# Patient Record
Sex: MALE | Race: WHITE | Employment: OTHER | URBAN - METROPOLITAN AREA
[De-identification: names, ages, dates, MRNs, and addresses within clinical notes are randomized per-mention and may not be internally consistent; named-entity substitution may affect disease eponyms.]

---

## 2017-01-01 ENCOUNTER — APPOINTMENT (OUTPATIENT)
Dept: CT IMAGING | Age: 82
DRG: 194 | End: 2017-01-01
Payer: MEDICARE

## 2017-01-01 ENCOUNTER — OFFICE VISIT (OUTPATIENT)
Dept: CARDIOLOGY | Age: 82
End: 2017-01-01

## 2017-01-01 ENCOUNTER — HOSPITAL ENCOUNTER (INPATIENT)
Age: 82
LOS: 3 days | Discharge: NURSING, REHAB OR LONG-TERM CARE | DRG: 194 | End: 2017-10-20
Attending: STUDENT IN AN ORGANIZED HEALTH CARE EDUCATION/TRAINING PROGRAM | Admitting: INTERNAL MEDICINE
Payer: MEDICARE

## 2017-01-01 ENCOUNTER — APPOINTMENT (OUTPATIENT)
Dept: GENERAL RADIOLOGY | Age: 82
DRG: 189 | End: 2017-01-01
Payer: MEDICARE

## 2017-01-01 ENCOUNTER — APPOINTMENT (OUTPATIENT)
Dept: GENERAL RADIOLOGY | Age: 82
DRG: 194 | End: 2017-01-01
Payer: MEDICARE

## 2017-01-01 ENCOUNTER — APPOINTMENT (OUTPATIENT)
Dept: GENERAL RADIOLOGY | Age: 82
DRG: 949 | End: 2017-01-01
Attending: PHYSICAL MEDICINE & REHABILITATION
Payer: MEDICARE

## 2017-01-01 ENCOUNTER — OFFICE VISIT (OUTPATIENT)
Dept: GERIATRIC MEDICINE | Age: 82
End: 2017-01-01

## 2017-01-01 ENCOUNTER — HOSPITAL ENCOUNTER (INPATIENT)
Age: 82
LOS: 3 days | Discharge: NURSING, REHAB OR LONG-TERM CARE | DRG: 189 | End: 2017-09-20
Attending: EMERGENCY MEDICINE | Admitting: INTERNAL MEDICINE
Payer: MEDICARE

## 2017-01-01 ENCOUNTER — HOSPITAL ENCOUNTER (INPATIENT)
Age: 82
LOS: 12 days | Discharge: HOME HEALTH | DRG: 949 | End: 2017-11-01
Attending: PHYSICAL MEDICINE & REHABILITATION | Admitting: PHYSICAL MEDICINE & REHABILITATION
Payer: MEDICARE

## 2017-01-01 VITALS
HEART RATE: 56 BPM | BODY MASS INDEX: 26.51 KG/M2 | TEMPERATURE: 97.3 F | DIASTOLIC BLOOD PRESSURE: 73 MMHG | SYSTOLIC BLOOD PRESSURE: 165 MMHG | HEIGHT: 73 IN | RESPIRATION RATE: 6 BRPM | WEIGHT: 200 LBS | OXYGEN SATURATION: 97 %

## 2017-01-01 VITALS
HEIGHT: 72 IN | WEIGHT: 195 LBS | RESPIRATION RATE: 16 BRPM | DIASTOLIC BLOOD PRESSURE: 74 MMHG | OXYGEN SATURATION: 97 % | HEART RATE: 70 BPM | BODY MASS INDEX: 26.41 KG/M2 | SYSTOLIC BLOOD PRESSURE: 136 MMHG | TEMPERATURE: 98.2 F

## 2017-01-01 VITALS
SYSTOLIC BLOOD PRESSURE: 136 MMHG | BODY MASS INDEX: 26.41 KG/M2 | DIASTOLIC BLOOD PRESSURE: 74 MMHG | WEIGHT: 195 LBS | HEART RATE: 76 BPM | TEMPERATURE: 98.2 F | OXYGEN SATURATION: 97 % | HEIGHT: 72 IN | RESPIRATION RATE: 18 BRPM

## 2017-01-01 VITALS
RESPIRATION RATE: 16 BRPM | HEART RATE: 60 BPM | HEIGHT: 72 IN | WEIGHT: 186.07 LBS | BODY MASS INDEX: 25.2 KG/M2 | SYSTOLIC BLOOD PRESSURE: 108 MMHG | OXYGEN SATURATION: 96 % | DIASTOLIC BLOOD PRESSURE: 65 MMHG | TEMPERATURE: 98 F

## 2017-01-01 VITALS — SYSTOLIC BLOOD PRESSURE: 110 MMHG | OXYGEN SATURATION: 98 % | DIASTOLIC BLOOD PRESSURE: 60 MMHG | HEART RATE: 83 BPM

## 2017-01-01 VITALS
OXYGEN SATURATION: 96 % | BODY MASS INDEX: 25.04 KG/M2 | WEIGHT: 195 LBS | DIASTOLIC BLOOD PRESSURE: 70 MMHG | HEART RATE: 72 BPM | SYSTOLIC BLOOD PRESSURE: 120 MMHG

## 2017-01-01 VITALS
OXYGEN SATURATION: 98 % | RESPIRATION RATE: 16 BRPM | HEART RATE: 62 BPM | SYSTOLIC BLOOD PRESSURE: 105 MMHG | TEMPERATURE: 97.5 F | DIASTOLIC BLOOD PRESSURE: 57 MMHG | BODY MASS INDEX: 25.74 KG/M2 | WEIGHT: 190.04 LBS | HEIGHT: 72 IN

## 2017-01-01 VITALS — HEART RATE: 86 BPM | SYSTOLIC BLOOD PRESSURE: 113 MMHG | TEMPERATURE: 98.2 F | DIASTOLIC BLOOD PRESSURE: 66 MMHG

## 2017-01-01 DIAGNOSIS — I10 ESSENTIAL HYPERTENSION: ICD-10-CM

## 2017-01-01 DIAGNOSIS — G31.84 MCI (MILD COGNITIVE IMPAIRMENT): ICD-10-CM

## 2017-01-01 DIAGNOSIS — R19.7 DIARRHEA, UNSPECIFIED TYPE: ICD-10-CM

## 2017-01-01 DIAGNOSIS — E78.5 DYSLIPIDEMIA: ICD-10-CM

## 2017-01-01 DIAGNOSIS — J44.1 COPD EXACERBATION (HCC): Primary | ICD-10-CM

## 2017-01-01 DIAGNOSIS — G30.9 ALZHEIMER'S DEMENTIA WITHOUT BEHAVIORAL DISTURBANCE, UNSPECIFIED TIMING OF DEMENTIA ONSET: ICD-10-CM

## 2017-01-01 DIAGNOSIS — R77.8 ELEVATED TROPONIN: ICD-10-CM

## 2017-01-01 DIAGNOSIS — R53.1 GENERAL WEAKNESS: ICD-10-CM

## 2017-01-01 DIAGNOSIS — R53.1 WEAKNESS: ICD-10-CM

## 2017-01-01 DIAGNOSIS — E11.65 TYPE 2 DIABETES MELLITUS WITH HYPERGLYCEMIA, UNSPECIFIED LONG TERM INSULIN USE STATUS: ICD-10-CM

## 2017-01-01 DIAGNOSIS — R06.00 DYSPNEA AND RESPIRATORY ABNORMALITIES: ICD-10-CM

## 2017-01-01 DIAGNOSIS — F02.80 ALZHEIMER'S DEMENTIA WITHOUT BEHAVIORAL DISTURBANCE, UNSPECIFIED TIMING OF DEMENTIA ONSET: ICD-10-CM

## 2017-01-01 DIAGNOSIS — R06.89 DYSPNEA AND RESPIRATORY ABNORMALITIES: ICD-10-CM

## 2017-01-01 DIAGNOSIS — J44.1 COPD WITH ACUTE EXACERBATION (HCC): Primary | ICD-10-CM

## 2017-01-01 DIAGNOSIS — F32.A DEPRESSION, UNSPECIFIED DEPRESSION TYPE: ICD-10-CM

## 2017-01-01 DIAGNOSIS — Z87.891 HISTORY OF TOBACCO USE: ICD-10-CM

## 2017-01-01 DIAGNOSIS — R79.82 ELEVATED C-REACTIVE PROTEIN (CRP): ICD-10-CM

## 2017-01-01 DIAGNOSIS — R09.02 HYPOXIA: ICD-10-CM

## 2017-01-01 DIAGNOSIS — R26.81 UNSTEADY GAIT: ICD-10-CM

## 2017-01-01 DIAGNOSIS — J18.9 PNEUMONIA DUE TO ORGANISM: Primary | ICD-10-CM

## 2017-01-01 DIAGNOSIS — R06.09 EXERTIONAL DYSPNEA: ICD-10-CM

## 2017-01-01 DIAGNOSIS — I25.10 CORONARY ARTERY DISEASE INVOLVING NATIVE CORONARY ARTERY OF NATIVE HEART WITHOUT ANGINA PECTORIS: Primary | Chronic | ICD-10-CM
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
## 2017-01-01 DIAGNOSIS — S09.90XA CLOSED HEAD INJURY, INITIAL ENCOUNTER: ICD-10-CM

## 2017-01-01 LAB
ALBUMIN SERPL-MCNC: 3.2 G/DL (ref 3.9–4.9)
ALBUMIN SERPL-MCNC: 3.3 G/DL (ref 3.9–4.9)
ALBUMIN SERPL-MCNC: 4.3 G/DL (ref 3.9–4.9)
ALP BLD-CCNC: 80 U/L (ref 35–104)
ALP BLD-CCNC: 80 U/L (ref 35–104)
ALP BLD-CCNC: 95 U/L (ref 35–104)
ALT SERPL-CCNC: 10 U/L (ref 0–41)
ALT SERPL-CCNC: 11 U/L (ref 0–41)
ALT SERPL-CCNC: 14 U/L (ref 0–41)
ANION GAP SERPL CALCULATED.3IONS-SCNC: 11 MEQ/L (ref 7–13)
ANION GAP SERPL CALCULATED.3IONS-SCNC: 13 MEQ/L (ref 7–13)
ANION GAP SERPL CALCULATED.3IONS-SCNC: 14 MEQ/L (ref 7–13)
ANION GAP SERPL CALCULATED.3IONS-SCNC: 17 MEQ/L (ref 7–13)
APTT: 28.5 SEC (ref 21.6–35.4)
AST SERPL-CCNC: 13 U/L (ref 0–40)
AST SERPL-CCNC: 16 U/L (ref 0–40)
AST SERPL-CCNC: 16 U/L (ref 0–40)
BASE EXCESS ARTERIAL: 5 (ref -3–3)
BASOPHILS ABSOLUTE: 0.1 K/UL (ref 0–0.2)
BASOPHILS ABSOLUTE: 0.1 K/UL (ref 0–0.2)
BASOPHILS RELATIVE PERCENT: 0.5 %
BASOPHILS RELATIVE PERCENT: 0.5 %
BILIRUB SERPL-MCNC: 0.5 MG/DL (ref 0–1.2)
BILIRUB SERPL-MCNC: 0.8 MG/DL (ref 0–1.2)
BILIRUB SERPL-MCNC: 0.9 MG/DL (ref 0–1.2)
BILIRUBIN URINE: NEGATIVE
BILIRUBIN URINE: NEGATIVE
BLOOD CULTURE, ROUTINE: NORMAL
BLOOD CULTURE, ROUTINE: NORMAL
BLOOD, URINE: ABNORMAL
BLOOD, URINE: ABNORMAL
BUN BLDV-MCNC: 12 MG/DL (ref 8–23)
BUN BLDV-MCNC: 13 MG/DL (ref 8–23)
BUN BLDV-MCNC: 14 MG/DL (ref 8–23)
BUN BLDV-MCNC: 14 MG/DL (ref 8–23)
C-REACTIVE PROTEIN, HIGH SENSITIVITY: 206 MG/L (ref 0–5)
CALCIUM IONIZED: 1.25 MMOL/L (ref 1.12–1.32)
CALCIUM SERPL-MCNC: 9.2 MG/DL (ref 8.6–10.2)
CALCIUM SERPL-MCNC: 9.3 MG/DL (ref 8.6–10.2)
CALCIUM SERPL-MCNC: 9.5 MG/DL (ref 8.6–10.2)
CALCIUM SERPL-MCNC: 9.6 MG/DL (ref 8.6–10.2)
CHLORIDE BLD-SCNC: 101 MEQ/L (ref 98–107)
CHLORIDE BLD-SCNC: 102 MEQ/L (ref 98–107)
CHLORIDE BLD-SCNC: 105 MEQ/L (ref 98–107)
CHLORIDE BLD-SCNC: 97 MEQ/L (ref 98–107)
CHOLESTEROL, TOTAL: 134 MG/DL (ref 0–199)
CLARITY: CLEAR
CLARITY: CLEAR
CLOSTRIDIUM DIFFICILE DNA AMPLIFICATION: NORMAL
CO2: 24 MEQ/L (ref 22–29)
CO2: 28 MEQ/L (ref 22–29)
CO2: 29 MEQ/L (ref 22–29)
CO2: 29 MEQ/L (ref 22–29)
COLOR: YELLOW
COLOR: YELLOW
CREAT SERPL-MCNC: 0.89 MG/DL (ref 0.7–1.2)
CREAT SERPL-MCNC: 0.96 MG/DL (ref 0.7–1.2)
CREAT SERPL-MCNC: 1.1 MG/DL (ref 0.7–1.2)
CREAT SERPL-MCNC: 1.14 MG/DL (ref 0.7–1.2)
CRYPTOSPORIDIUM ANTIGEN STOOL: NORMAL
CRYSTALS, UA: ABNORMAL
CRYSTALS, UA: ABNORMAL
CULTURE, BLOOD 2: NORMAL
CULTURE, BLOOD 2: NORMAL
EKG ATRIAL RATE: 86 BPM
EKG ATRIAL RATE: 90 BPM
EKG P AXIS: 44 DEGREES
EKG P AXIS: 59 DEGREES
EKG P-R INTERVAL: 222 MS
EKG P-R INTERVAL: 258 MS
EKG Q-T INTERVAL: 326 MS
EKG Q-T INTERVAL: 330 MS
EKG QRS DURATION: 78 MS
EKG QRS DURATION: 82 MS
EKG QTC CALCULATION (BAZETT): 394 MS
EKG QTC CALCULATION (BAZETT): 398 MS
EKG R AXIS: 13 DEGREES
EKG R AXIS: 7 DEGREES
EKG T AXIS: 68 DEGREES
EKG T AXIS: 82 DEGREES
EKG VENTRICULAR RATE: 86 BPM
EKG VENTRICULAR RATE: 90 BPM
EOSINOPHILS ABSOLUTE: 0 K/UL (ref 0–0.7)
EOSINOPHILS ABSOLUTE: 0.1 K/UL (ref 0–0.7)
EOSINOPHILS RELATIVE PERCENT: 0.3 %
EOSINOPHILS RELATIVE PERCENT: 0.9 %
EPITHELIAL CELLS, UA: ABNORMAL /HPF
EPITHELIAL CELLS, UA: ABNORMAL /HPF
GFR AFRICAN AMERICAN: >60
GFR NON-AFRICAN AMERICAN: 58
GFR NON-AFRICAN AMERICAN: >60
GI BACTERIAL PATHOGENS BY PCR: NORMAL
GIARDIA ANTIGEN STOOL: NORMAL
GLOBULIN: 3 G/DL (ref 2.3–3.5)
GLOBULIN: 3.1 G/DL (ref 2.3–3.5)
GLOBULIN: 3.2 G/DL (ref 2.3–3.5)
GLUCOSE BLD-MCNC: 118 MG/DL (ref 60–115)
GLUCOSE BLD-MCNC: 119 MG/DL (ref 74–109)
GLUCOSE BLD-MCNC: 143 MG/DL (ref 74–109)
GLUCOSE BLD-MCNC: 143 MG/DL (ref 74–109)
GLUCOSE BLD-MCNC: 156 MG/DL (ref 60–115)
GLUCOSE BLD-MCNC: 196 MG/DL (ref 60–115)
GLUCOSE BLD-MCNC: 82 MG/DL (ref 74–109)
GLUCOSE URINE: NEGATIVE MG/DL
GLUCOSE URINE: NEGATIVE MG/DL
HBA1C MFR BLD: 6 % (ref 4.8–5.9)
HCO3 ARTERIAL: 30.5 MMOL/L (ref 21–29)
HCT VFR BLD CALC: 37.7 % (ref 42–52)
HCT VFR BLD CALC: 39.1 % (ref 42–52)
HCT VFR BLD CALC: 39.6 % (ref 42–52)
HCT VFR BLD CALC: 42.6 % (ref 42–52)
HCT VFR BLD CALC: 47.4 % (ref 42–52)
HDLC SERPL-MCNC: 36 MG/DL (ref 40–59)
HEMOGLOBIN: 12.4 G/DL (ref 14–18)
HEMOGLOBIN: 12.8 G/DL (ref 14–18)
HEMOGLOBIN: 12.8 G/DL (ref 14–18)
HEMOGLOBIN: 13.7 G/DL (ref 14–18)
HEMOGLOBIN: 15.1 G/DL (ref 14–18)
HEMOGLOBIN: 15.7 GM/DL (ref 13.5–17.5)
INR BLD: 1.1
KETONES, URINE: NEGATIVE MG/DL
KETONES, URINE: NEGATIVE MG/DL
LACTATE: 1.65 MMOL/L (ref 0.4–2)
LACTIC ACID: 1.3 MMOL/L (ref 0.5–2.2)
LDL CHOLESTEROL CALCULATED: 78 MG/DL (ref 0–129)
LEUKOCYTE ESTERASE, URINE: NEGATIVE
LEUKOCYTE ESTERASE, URINE: NEGATIVE
LYMPHOCYTES ABSOLUTE: 0.9 K/UL (ref 1–4.8)
LYMPHOCYTES ABSOLUTE: 1.1 K/UL (ref 1–4.8)
LYMPHOCYTES RELATIVE PERCENT: 7.7 %
LYMPHOCYTES RELATIVE PERCENT: 8.4 %
MAGNESIUM: 1.9 MG/DL (ref 1.7–2.3)
MCH RBC QN AUTO: 29.6 PG (ref 27–31.3)
MCH RBC QN AUTO: 29.9 PG (ref 27–31.3)
MCH RBC QN AUTO: 29.9 PG (ref 27–31.3)
MCH RBC QN AUTO: 30.1 PG (ref 27–31.3)
MCH RBC QN AUTO: 30.2 PG (ref 27–31.3)
MCHC RBC AUTO-ENTMCNC: 31.8 % (ref 33–37)
MCHC RBC AUTO-ENTMCNC: 32.1 % (ref 33–37)
MCHC RBC AUTO-ENTMCNC: 32.3 % (ref 33–37)
MCHC RBC AUTO-ENTMCNC: 32.7 % (ref 33–37)
MCHC RBC AUTO-ENTMCNC: 32.9 % (ref 33–37)
MCV RBC AUTO: 91.2 FL (ref 80–100)
MCV RBC AUTO: 91.8 FL (ref 80–100)
MCV RBC AUTO: 92.6 FL (ref 80–100)
MCV RBC AUTO: 93.3 FL (ref 80–100)
MCV RBC AUTO: 93.9 FL (ref 80–100)
MONOCYTES ABSOLUTE: 0.9 K/UL (ref 0.2–0.8)
MONOCYTES ABSOLUTE: 1.4 K/UL (ref 0.2–0.8)
MONOCYTES RELATIVE PERCENT: 11.6 %
MONOCYTES RELATIVE PERCENT: 6.4 %
MUCUS: PRESENT
NEUTROPHILS ABSOLUTE: 11.1 K/UL (ref 1.4–6.5)
NEUTROPHILS ABSOLUTE: 9.9 K/UL (ref 1.4–6.5)
NEUTROPHILS RELATIVE PERCENT: 79.9 %
NEUTROPHILS RELATIVE PERCENT: 83.8 %
NITRITE, URINE: NEGATIVE
NITRITE, URINE: NEGATIVE
O2 SAT, ARTERIAL: 94 % (ref 93–100)
PCO2 ARTERIAL: 53 MM HG (ref 35–45)
PDW BLD-RTO: 14.3 % (ref 11.5–14.5)
PDW BLD-RTO: 14.6 % (ref 11.5–14.5)
PDW BLD-RTO: 14.8 % (ref 11.5–14.5)
PDW BLD-RTO: 14.9 % (ref 11.5–14.5)
PDW BLD-RTO: 15 % (ref 11.5–14.5)
PERFORMED ON: ABNORMAL
PERFORMED ON: NORMAL
PH ARTERIAL: 7.37 (ref 7.35–7.45)
PH UA: 6 (ref 5–9)
PH UA: 6 (ref 5–9)
PLATELET # BLD: 142 K/UL (ref 130–400)
PLATELET # BLD: 146 K/UL (ref 130–400)
PLATELET # BLD: 172 K/UL (ref 130–400)
PLATELET # BLD: 197 K/UL (ref 130–400)
PLATELET # BLD: 199 K/UL (ref 130–400)
PO2 ARTERIAL: 74 MM HG (ref 75–108)
POC CHLORIDE: 105 MEQ/L (ref 99–110)
POC CREATININE WHOLE BLOOD: 1.1
POC CREATININE: 1.1 MG/DL (ref 0.8–1.3)
POC CREATININE: 1.2 MG/DL (ref 0.8–1.3)
POC HEMATOCRIT: 46 % (ref 41–53)
POC POTASSIUM: 4 MEQ/L (ref 3.5–5.1)
POC SAMPLE TYPE: ABNORMAL
POC SAMPLE TYPE: NORMAL
POC SODIUM: 142 MEQ/L (ref 136–145)
POTASSIUM SERPL-SCNC: 3.9 MEQ/L (ref 3.5–5.1)
POTASSIUM SERPL-SCNC: 4.3 MEQ/L (ref 3.5–5.1)
POTASSIUM SERPL-SCNC: 4.5 MEQ/L (ref 3.5–5.1)
POTASSIUM SERPL-SCNC: 4.5 MEQ/L (ref 3.5–5.1)
PRO-BNP: 780 PG/ML
PROTEIN UA: 30 MG/DL
PROTEIN UA: NEGATIVE MG/DL
PROTHROMBIN TIME: 11.2 SEC (ref 8.1–13.7)
RBC # BLD: 4.1 M/UL (ref 4.7–6.1)
RBC # BLD: 4.27 M/UL (ref 4.7–6.1)
RBC # BLD: 4.28 M/UL (ref 4.7–6.1)
RBC # BLD: 4.54 M/UL (ref 4.7–6.1)
RBC # BLD: 5.08 M/UL (ref 4.7–6.1)
RBC UA: ABNORMAL /HPF (ref 0–2)
RBC UA: ABNORMAL /HPF (ref 0–2)
SODIUM BLD-SCNC: 139 MEQ/L (ref 132–144)
SODIUM BLD-SCNC: 141 MEQ/L (ref 132–144)
SODIUM BLD-SCNC: 144 MEQ/L (ref 132–144)
SODIUM BLD-SCNC: 146 MEQ/L (ref 132–144)
SPECIFIC GRAVITY UA: 1.02 (ref 1–1.03)
SPECIFIC GRAVITY UA: 1.03 (ref 1–1.03)
TCO2 ARTERIAL: 32 (ref 22–29)
TOTAL CK: 33 U/L (ref 0–190)
TOTAL PROTEIN: 6.2 G/DL (ref 6.4–8.1)
TOTAL PROTEIN: 6.5 G/DL (ref 6.4–8.1)
TOTAL PROTEIN: 7.4 G/DL (ref 6.4–8.1)
TRIGL SERPL-MCNC: 101 MG/DL (ref 0–200)
TROPONIN: 0.01 NG/ML (ref 0–0.01)
TROPONIN: <0.01 NG/ML (ref 0–0.01)
URINE CULTURE, ROUTINE: NORMAL
URINE CULTURE, ROUTINE: NORMAL
URINE REFLEX TO CULTURE: YES
UROBILINOGEN, URINE: 1 E.U./DL
UROBILINOGEN, URINE: 1 E.U./DL
WBC # BLD: 10.5 K/UL (ref 4.8–10.8)
WBC # BLD: 10.7 K/UL (ref 4.8–10.8)
WBC # BLD: 12.4 K/UL (ref 4.8–10.8)
WBC # BLD: 13.3 K/UL (ref 4.8–10.8)
WBC # BLD: 6.3 K/UL (ref 4.8–10.8)
WBC UA: ABNORMAL /HPF (ref 0–5)
WBC UA: ABNORMAL /HPF (ref 0–5)

## 2017-01-01 PROCEDURE — 97116 GAIT TRAINING THERAPY: CPT

## 2017-01-01 PROCEDURE — 6370000000 HC RX 637 (ALT 250 FOR IP): Performed by: INTERNAL MEDICINE

## 2017-01-01 PROCEDURE — 97530 THERAPEUTIC ACTIVITIES: CPT

## 2017-01-01 PROCEDURE — 6360000002 HC RX W HCPCS: Performed by: EMERGENCY MEDICINE

## 2017-01-01 PROCEDURE — 6370000000 HC RX 637 (ALT 250 FOR IP): Performed by: PHYSICAL MEDICINE & REHABILITATION

## 2017-01-01 PROCEDURE — 94640 AIRWAY INHALATION TREATMENT: CPT

## 2017-01-01 PROCEDURE — G8598 ASA/ANTIPLAT THER USED: HCPCS | Performed by: INTERNAL MEDICINE

## 2017-01-01 PROCEDURE — 97166 OT EVAL MOD COMPLEX 45 MIN: CPT

## 2017-01-01 PROCEDURE — 85610 PROTHROMBIN TIME: CPT

## 2017-01-01 PROCEDURE — 1180000000 HC REHAB R&B

## 2017-01-01 PROCEDURE — 6360000002 HC RX W HCPCS: Performed by: INTERNAL MEDICINE

## 2017-01-01 PROCEDURE — 97535 SELF CARE MNGMENT TRAINING: CPT

## 2017-01-01 PROCEDURE — 83880 ASSAY OF NATRIURETIC PEPTIDE: CPT

## 2017-01-01 PROCEDURE — 97110 THERAPEUTIC EXERCISES: CPT

## 2017-01-01 PROCEDURE — 2500000003 HC RX 250 WO HCPCS: Performed by: RADIOLOGY

## 2017-01-01 PROCEDURE — 1210000000 HC MED SURG R&B

## 2017-01-01 PROCEDURE — 80061 LIPID PANEL: CPT

## 2017-01-01 PROCEDURE — 87086 URINE CULTURE/COLONY COUNT: CPT

## 2017-01-01 PROCEDURE — 82565 ASSAY OF CREATININE: CPT

## 2017-01-01 PROCEDURE — 2580000003 HC RX 258: Performed by: EMERGENCY MEDICINE

## 2017-01-01 PROCEDURE — 36415 COLL VENOUS BLD VENIPUNCTURE: CPT

## 2017-01-01 PROCEDURE — 74177 CT ABD & PELVIS W/CONTRAST: CPT

## 2017-01-01 PROCEDURE — 99315 NF DSCHRG MGMT 30 MIN/LESS: CPT | Performed by: NURSE PRACTITIONER

## 2017-01-01 PROCEDURE — 94760 N-INVAS EAR/PLS OXIMETRY 1: CPT

## 2017-01-01 PROCEDURE — 99231 SBSQ HOSP IP/OBS SF/LOW 25: CPT | Performed by: PHYSICAL MEDICINE & REHABILITATION

## 2017-01-01 PROCEDURE — 2700000000 HC OXYGEN THERAPY PER DAY

## 2017-01-01 PROCEDURE — 97532 HC OT DEVELOP COGNITIVE SKILLS 15MIN: CPT

## 2017-01-01 PROCEDURE — 82435 ASSAY OF BLOOD CHLORIDE: CPT

## 2017-01-01 PROCEDURE — 6360000002 HC RX W HCPCS: Performed by: STUDENT IN AN ORGANIZED HEALTH CARE EDUCATION/TRAINING PROGRAM

## 2017-01-01 PROCEDURE — 97112 NEUROMUSCULAR REEDUCATION: CPT

## 2017-01-01 PROCEDURE — 97532 HC COGNITIVE THERAPY 15 MIN: CPT

## 2017-01-01 PROCEDURE — 84484 ASSAY OF TROPONIN QUANT: CPT

## 2017-01-01 PROCEDURE — G8484 FLU IMMUNIZE NO ADMIN: HCPCS | Performed by: INTERNAL MEDICINE

## 2017-01-01 PROCEDURE — 92523 SPEECH SOUND LANG COMPREHEN: CPT

## 2017-01-01 PROCEDURE — 2580000003 HC RX 258: Performed by: INTERNAL MEDICINE

## 2017-01-01 PROCEDURE — 94620 HC 6-MINUTE WALK TEST/PULM STRESS TEST SIMPLE: CPT

## 2017-01-01 PROCEDURE — G8978 MOBILITY CURRENT STATUS: HCPCS

## 2017-01-01 PROCEDURE — 92610 EVALUATE SWALLOWING FUNCTION: CPT

## 2017-01-01 PROCEDURE — 87328 CRYPTOSPORIDIUM AG IA: CPT

## 2017-01-01 PROCEDURE — 94664 DEMO&/EVAL PT USE INHALER: CPT

## 2017-01-01 PROCEDURE — 72125 CT NECK SPINE W/O DYE: CPT

## 2017-01-01 PROCEDURE — 80053 COMPREHEN METABOLIC PANEL: CPT

## 2017-01-01 PROCEDURE — 99285 EMERGENCY DEPT VISIT HI MDM: CPT

## 2017-01-01 PROCEDURE — 92526 ORAL FUNCTION THERAPY: CPT

## 2017-01-01 PROCEDURE — 85025 COMPLETE CBC W/AUTO DIFF WBC: CPT

## 2017-01-01 PROCEDURE — 99213 OFFICE O/P EST LOW 20 MIN: CPT | Performed by: INTERNAL MEDICINE

## 2017-01-01 PROCEDURE — 87506 IADNA-DNA/RNA PROBE TQ 6-11: CPT

## 2017-01-01 PROCEDURE — 85014 HEMATOCRIT: CPT

## 2017-01-01 PROCEDURE — 1036F TOBACCO NON-USER: CPT | Performed by: INTERNAL MEDICINE

## 2017-01-01 PROCEDURE — G8427 DOCREV CUR MEDS BY ELIG CLIN: HCPCS | Performed by: INTERNAL MEDICINE

## 2017-01-01 PROCEDURE — 82550 ASSAY OF CK (CPK): CPT

## 2017-01-01 PROCEDURE — 2500000003 HC RX 250 WO HCPCS: Performed by: STUDENT IN AN ORGANIZED HEALTH CARE EDUCATION/TRAINING PROGRAM

## 2017-01-01 PROCEDURE — 84132 ASSAY OF SERUM POTASSIUM: CPT

## 2017-01-01 PROCEDURE — 97150 GROUP THERAPEUTIC PROCEDURES: CPT

## 2017-01-01 PROCEDURE — 97161 PT EVAL LOW COMPLEX 20 MIN: CPT

## 2017-01-01 PROCEDURE — 4040F PNEUMOC VAC/ADMIN/RCVD: CPT | Performed by: INTERNAL MEDICINE

## 2017-01-01 PROCEDURE — 82330 ASSAY OF CALCIUM: CPT

## 2017-01-01 PROCEDURE — G8987 SELF CARE CURRENT STATUS: HCPCS

## 2017-01-01 PROCEDURE — 99221 1ST HOSP IP/OBS SF/LOW 40: CPT | Performed by: PHYSICAL MEDICINE & REHABILITATION

## 2017-01-01 PROCEDURE — 2580000003 HC RX 258: Performed by: STUDENT IN AN ORGANIZED HEALTH CARE EDUCATION/TRAINING PROGRAM

## 2017-01-01 PROCEDURE — G8419 CALC BMI OUT NRM PARAM NOF/U: HCPCS | Performed by: INTERNAL MEDICINE

## 2017-01-01 PROCEDURE — 87329 GIARDIA AG IA: CPT

## 2017-01-01 PROCEDURE — 99222 1ST HOSP IP/OBS MODERATE 55: CPT | Performed by: INTERNAL MEDICINE

## 2017-01-01 PROCEDURE — 87493 C DIFF AMPLIFIED PROBE: CPT

## 2017-01-01 PROCEDURE — 97140 MANUAL THERAPY 1/> REGIONS: CPT

## 2017-01-01 PROCEDURE — 99238 HOSP IP/OBS DSCHRG MGMT 30/<: CPT | Performed by: PHYSICAL MEDICINE & REHABILITATION

## 2017-01-01 PROCEDURE — 99232 SBSQ HOSP IP/OBS MODERATE 35: CPT | Performed by: INTERNAL MEDICINE

## 2017-01-01 PROCEDURE — 6360000004 HC RX CONTRAST MEDICATION: Performed by: STUDENT IN AN ORGANIZED HEALTH CARE EDUCATION/TRAINING PROGRAM

## 2017-01-01 PROCEDURE — 97162 PT EVAL MOD COMPLEX 30 MIN: CPT

## 2017-01-01 PROCEDURE — 85027 COMPLETE CBC AUTOMATED: CPT

## 2017-01-01 PROCEDURE — 99232 SBSQ HOSP IP/OBS MODERATE 35: CPT | Performed by: PHYSICAL MEDICINE & REHABILITATION

## 2017-01-01 PROCEDURE — 82803 BLOOD GASES ANY COMBINATION: CPT

## 2017-01-01 PROCEDURE — 1123F ACP DISCUSS/DSCN MKR DOCD: CPT | Performed by: INTERNAL MEDICINE

## 2017-01-01 PROCEDURE — 99308 SBSQ NF CARE LOW MDM 20: CPT | Performed by: NURSE PRACTITIONER

## 2017-01-01 PROCEDURE — 1123F ACP DISCUSS/DSCN MKR DOCD: CPT | Performed by: NURSE PRACTITIONER

## 2017-01-01 PROCEDURE — 97167 OT EVAL HIGH COMPLEX 60 MIN: CPT

## 2017-01-01 PROCEDURE — 96374 THER/PROPH/DIAG INJ IV PUSH: CPT

## 2017-01-01 PROCEDURE — 81001 URINALYSIS AUTO W/SCOPE: CPT

## 2017-01-01 PROCEDURE — 83735 ASSAY OF MAGNESIUM: CPT

## 2017-01-01 PROCEDURE — 83605 ASSAY OF LACTIC ACID: CPT

## 2017-01-01 PROCEDURE — G8979 MOBILITY GOAL STATUS: HCPCS

## 2017-01-01 PROCEDURE — G8988 SELF CARE GOAL STATUS: HCPCS

## 2017-01-01 PROCEDURE — 94762 N-INVAS EAR/PLS OXIMTRY CONT: CPT

## 2017-01-01 PROCEDURE — 99305 1ST NF CARE MODERATE MDM 35: CPT | Performed by: INTERNAL MEDICINE

## 2017-01-01 PROCEDURE — 92611 MOTION FLUOROSCOPY/SWALLOW: CPT

## 2017-01-01 PROCEDURE — 87040 BLOOD CULTURE FOR BACTERIA: CPT

## 2017-01-01 PROCEDURE — 70450 CT HEAD/BRAIN W/O DYE: CPT

## 2017-01-01 PROCEDURE — 93005 ELECTROCARDIOGRAM TRACING: CPT

## 2017-01-01 PROCEDURE — 86141 C-REACTIVE PROTEIN HS: CPT

## 2017-01-01 PROCEDURE — 6370000000 HC RX 637 (ALT 250 FOR IP): Performed by: EMERGENCY MEDICINE

## 2017-01-01 PROCEDURE — 85730 THROMBOPLASTIN TIME PARTIAL: CPT

## 2017-01-01 PROCEDURE — 71020 XR CHEST STANDARD TWO VW: CPT

## 2017-01-01 PROCEDURE — 74230 X-RAY XM SWLNG FUNCJ C+: CPT

## 2017-01-01 PROCEDURE — 99233 SBSQ HOSP IP/OBS HIGH 50: CPT | Performed by: PHYSICAL MEDICINE & REHABILITATION

## 2017-01-01 PROCEDURE — 36600 WITHDRAWAL OF ARTERIAL BLOOD: CPT

## 2017-01-01 PROCEDURE — 71010 XR CHEST PORTABLE: CPT

## 2017-01-01 RX ORDER — AMOXICILLIN AND CLAVULANATE POTASSIUM 875; 125 MG/1; MG/1
1 TABLET, FILM COATED ORAL 2 TIMES DAILY
Status: DISCONTINUED | OUTPATIENT
Start: 2017-01-01 | End: 2017-01-01 | Stop reason: HOSPADM

## 2017-01-01 RX ORDER — IPRATROPIUM BROMIDE AND ALBUTEROL SULFATE 2.5; .5 MG/3ML; MG/3ML
1 SOLUTION RESPIRATORY (INHALATION) 4 TIMES DAILY
Status: DISCONTINUED | OUTPATIENT
Start: 2017-01-01 | End: 2017-01-01 | Stop reason: SDUPTHER

## 2017-01-01 RX ORDER — IPRATROPIUM BROMIDE AND ALBUTEROL SULFATE 2.5; .5 MG/3ML; MG/3ML
1 SOLUTION RESPIRATORY (INHALATION)
Status: DISCONTINUED | OUTPATIENT
Start: 2017-01-01 | End: 2017-01-01

## 2017-01-01 RX ORDER — MIRTAZAPINE 30 MG/1
30 TABLET, FILM COATED ORAL NIGHTLY
Status: DISCONTINUED | OUTPATIENT
Start: 2017-01-01 | End: 2017-01-01 | Stop reason: HOSPADM

## 2017-01-01 RX ORDER — DONEPEZIL HYDROCHLORIDE 10 MG/1
10 TABLET, FILM COATED ORAL NIGHTLY
COMMUNITY
End: 2017-01-01 | Stop reason: ALTCHOICE

## 2017-01-01 RX ORDER — MIRTAZAPINE 15 MG/1
30 TABLET, FILM COATED ORAL NIGHTLY
Status: DISCONTINUED | OUTPATIENT
Start: 2017-01-01 | End: 2017-01-01 | Stop reason: HOSPADM

## 2017-01-01 RX ORDER — ASPIRIN 81 MG/1
81 TABLET, CHEWABLE ORAL DAILY
Status: DISCONTINUED | OUTPATIENT
Start: 2017-01-01 | End: 2017-01-01 | Stop reason: HOSPADM

## 2017-01-01 RX ORDER — GUAIFENESIN/DEXTROMETHORPHAN 100-10MG/5
5 SYRUP ORAL EVERY 4 HOURS PRN
Status: DISCONTINUED | OUTPATIENT
Start: 2017-01-01 | End: 2017-01-01 | Stop reason: HOSPADM

## 2017-01-01 RX ORDER — SIMVASTATIN 40 MG
40 TABLET ORAL NIGHTLY
Status: DISCONTINUED | OUTPATIENT
Start: 2017-01-01 | End: 2017-01-01 | Stop reason: HOSPADM

## 2017-01-01 RX ORDER — NITROGLYCERIN 0.4 MG/1
0.4 TABLET SUBLINGUAL EVERY 5 MIN PRN
Status: DISCONTINUED | OUTPATIENT
Start: 2017-01-01 | End: 2017-01-01 | Stop reason: HOSPADM

## 2017-01-01 RX ORDER — ATENOLOL 25 MG/1
25 TABLET ORAL DAILY
Status: DISCONTINUED | OUTPATIENT
Start: 2017-01-01 | End: 2017-01-01 | Stop reason: HOSPADM

## 2017-01-01 RX ORDER — SODIUM CHLORIDE 0.9 % (FLUSH) 0.9 %
10 SYRINGE (ML) INJECTION EVERY 12 HOURS SCHEDULED
Status: DISCONTINUED | OUTPATIENT
Start: 2017-01-01 | End: 2017-01-01 | Stop reason: HOSPADM

## 2017-01-01 RX ORDER — ASPIRIN 81 MG/1
81 TABLET, CHEWABLE ORAL DAILY
Status: CANCELLED | OUTPATIENT
Start: 2017-01-01

## 2017-01-01 RX ORDER — GENTAMICIN SULFATE 3 MG/ML
1 SOLUTION/ DROPS OPHTHALMIC 3 TIMES DAILY
Status: COMPLETED | OUTPATIENT
Start: 2017-01-01 | End: 2017-01-01

## 2017-01-01 RX ORDER — DEXTRAN 70 AND HYPROMELLOSE 2910 1; 3 MG/ML; MG/ML
1 SOLUTION/ DROPS OPHTHALMIC 4 TIMES DAILY PRN
Status: DISCONTINUED | OUTPATIENT
Start: 2017-01-01 | End: 2017-01-01 | Stop reason: HOSPADM

## 2017-01-01 RX ORDER — ALLOPURINOL 100 MG/1
100 TABLET ORAL DAILY
Status: DISCONTINUED | OUTPATIENT
Start: 2017-01-01 | End: 2017-01-01 | Stop reason: HOSPADM

## 2017-01-01 RX ORDER — POTASSIUM CHLORIDE 750 MG/1
20 CAPSULE, EXTENDED RELEASE ORAL 2 TIMES DAILY
Status: CANCELLED | OUTPATIENT
Start: 2017-01-01

## 2017-01-01 RX ORDER — DONEPEZIL HYDROCHLORIDE 10 MG/1
10 TABLET, FILM COATED ORAL NIGHTLY
Status: CANCELLED | OUTPATIENT
Start: 2017-01-01

## 2017-01-01 RX ORDER — POTASSIUM CHLORIDE 20 MEQ/1
20 TABLET, EXTENDED RELEASE ORAL 2 TIMES DAILY
Status: DISCONTINUED | OUTPATIENT
Start: 2017-01-01 | End: 2017-01-01 | Stop reason: HOSPADM

## 2017-01-01 RX ORDER — DOCUSATE SODIUM 100 MG/1
100 CAPSULE, LIQUID FILLED ORAL 2 TIMES DAILY
Status: DISCONTINUED | OUTPATIENT
Start: 2017-01-01 | End: 2017-01-01 | Stop reason: HOSPADM

## 2017-01-01 RX ORDER — ONDANSETRON 2 MG/ML
4 INJECTION INTRAMUSCULAR; INTRAVENOUS EVERY 6 HOURS PRN
Status: DISCONTINUED | OUTPATIENT
Start: 2017-01-01 | End: 2017-01-01 | Stop reason: HOSPADM

## 2017-01-01 RX ORDER — DONEPEZIL HYDROCHLORIDE 10 MG/1
10 TABLET, FILM COATED ORAL NIGHTLY
Status: DISCONTINUED | OUTPATIENT
Start: 2017-01-01 | End: 2017-01-01 | Stop reason: HOSPADM

## 2017-01-01 RX ORDER — LATANOPROST 50 UG/ML
1 SOLUTION/ DROPS OPHTHALMIC NIGHTLY
COMMUNITY

## 2017-01-01 RX ORDER — CHOLECALCIFEROL (VITAMIN D3) 125 MCG
1000 CAPSULE ORAL DAILY
Status: DISCONTINUED | OUTPATIENT
Start: 2017-01-01 | End: 2017-01-01 | Stop reason: HOSPADM

## 2017-01-01 RX ORDER — ACETAMINOPHEN 325 MG/1
650 TABLET ORAL EVERY 4 HOURS PRN
Status: DISCONTINUED | OUTPATIENT
Start: 2017-01-01 | End: 2017-01-01 | Stop reason: HOSPADM

## 2017-01-01 RX ORDER — CHOLECALCIFEROL (VITAMIN D3) 125 MCG
100 CAPSULE ORAL 2 TIMES DAILY
Status: CANCELLED | OUTPATIENT
Start: 2017-01-01

## 2017-01-01 RX ORDER — ATENOLOL 25 MG/1
25 TABLET ORAL DAILY
Status: CANCELLED | OUTPATIENT
Start: 2017-01-01

## 2017-01-01 RX ORDER — SODIUM CHLORIDE 9 MG/ML
INJECTION, SOLUTION INTRAVENOUS
Status: DISCONTINUED
Start: 2017-01-01 | End: 2017-01-01 | Stop reason: WASHOUT

## 2017-01-01 RX ORDER — PANTOPRAZOLE SODIUM 40 MG/1
40 TABLET, DELAYED RELEASE ORAL
Status: CANCELLED | OUTPATIENT
Start: 2017-01-01

## 2017-01-01 RX ORDER — OXYMETAZOLINE HYDROCHLORIDE 0.05 G/100ML
2 SPRAY NASAL 2 TIMES DAILY
Status: DISCONTINUED | OUTPATIENT
Start: 2017-01-01 | End: 2017-01-01 | Stop reason: HOSPADM

## 2017-01-01 RX ORDER — ATENOLOL 25 MG/1
25 TABLET ORAL DAILY
COMMUNITY
End: 2017-01-01 | Stop reason: ALTCHOICE

## 2017-01-01 RX ORDER — VITS A,C,E/LUTEIN/MINERALS 300MCG-200
1 TABLET ORAL 2 TIMES DAILY
Status: DISCONTINUED | OUTPATIENT
Start: 2017-01-01 | End: 2017-01-01 | Stop reason: HOSPADM

## 2017-01-01 RX ORDER — ALBUTEROL SULFATE 2.5 MG/3ML
2.5 SOLUTION RESPIRATORY (INHALATION)
Status: DISCONTINUED | OUTPATIENT
Start: 2017-01-01 | End: 2017-01-01 | Stop reason: HOSPADM

## 2017-01-01 RX ORDER — ALBUTEROL SULFATE 2.5 MG/3ML
2.5 SOLUTION RESPIRATORY (INHALATION)
Status: CANCELLED | OUTPATIENT
Start: 2017-01-01

## 2017-01-01 RX ORDER — NITROGLYCERIN 0.4 MG/1
0.4 TABLET SUBLINGUAL EVERY 5 MIN PRN
Status: CANCELLED | OUTPATIENT
Start: 2017-01-01

## 2017-01-01 RX ORDER — IPRATROPIUM BROMIDE AND ALBUTEROL SULFATE 2.5; .5 MG/3ML; MG/3ML
1 SOLUTION RESPIRATORY (INHALATION) 4 TIMES DAILY
Status: DISCONTINUED | OUTPATIENT
Start: 2017-01-01 | End: 2017-01-01 | Stop reason: HOSPADM

## 2017-01-01 RX ORDER — LATANOPROST 50 UG/ML
1 SOLUTION/ DROPS OPHTHALMIC NIGHTLY
Status: DISCONTINUED | OUTPATIENT
Start: 2017-01-01 | End: 2017-01-01 | Stop reason: HOSPADM

## 2017-01-01 RX ORDER — AMOXICILLIN AND CLAVULANATE POTASSIUM 875; 125 MG/1; MG/1
1 TABLET, FILM COATED ORAL 2 TIMES DAILY
Status: CANCELLED | OUTPATIENT
Start: 2017-01-01

## 2017-01-01 RX ORDER — PANTOPRAZOLE SODIUM 40 MG/1
40 TABLET, DELAYED RELEASE ORAL
Status: DISCONTINUED | OUTPATIENT
Start: 2017-01-01 | End: 2017-01-01 | Stop reason: HOSPADM

## 2017-01-01 RX ORDER — IPRATROPIUM BROMIDE AND ALBUTEROL SULFATE 2.5; .5 MG/3ML; MG/3ML
3 SOLUTION RESPIRATORY (INHALATION) 4 TIMES DAILY
Qty: 360 ML | DISCHARGE
Start: 2017-01-01 | End: 2017-01-01 | Stop reason: SDUPTHER

## 2017-01-01 RX ORDER — ACETAMINOPHEN 325 MG/1
650 TABLET ORAL EVERY 4 HOURS PRN
Status: DISCONTINUED | OUTPATIENT
Start: 2017-01-01 | End: 2017-01-01 | Stop reason: SDUPTHER

## 2017-01-01 RX ORDER — ALLOPURINOL 100 MG/1
100 TABLET ORAL DAILY
COMMUNITY

## 2017-01-01 RX ORDER — POTASSIUM CHLORIDE 750 MG/1
20 CAPSULE, EXTENDED RELEASE ORAL 2 TIMES DAILY
COMMUNITY

## 2017-01-01 RX ORDER — LATANOPROST 50 UG/ML
1 SOLUTION/ DROPS OPHTHALMIC NIGHTLY
Status: CANCELLED | OUTPATIENT
Start: 2017-01-01

## 2017-01-01 RX ORDER — MIRTAZAPINE 15 MG/1
30 TABLET, FILM COATED ORAL NIGHTLY
COMMUNITY

## 2017-01-01 RX ORDER — FAMOTIDINE 20 MG/1
20 TABLET, FILM COATED ORAL 2 TIMES DAILY
Status: DISCONTINUED | OUTPATIENT
Start: 2017-01-01 | End: 2017-01-01

## 2017-01-01 RX ORDER — ACETAMINOPHEN 325 MG/1
650 TABLET ORAL EVERY 4 HOURS PRN
COMMUNITY

## 2017-01-01 RX ORDER — ACETAMINOPHEN 325 MG/1
650 TABLET ORAL EVERY 4 HOURS PRN
Status: CANCELLED | OUTPATIENT
Start: 2017-01-01

## 2017-01-01 RX ORDER — MEMANTINE HYDROCHLORIDE 10 MG/1
5 TABLET ORAL 2 TIMES DAILY
Status: DISCONTINUED | OUTPATIENT
Start: 2017-01-01 | End: 2017-01-01 | Stop reason: HOSPADM

## 2017-01-01 RX ORDER — IPRATROPIUM BROMIDE AND ALBUTEROL SULFATE 2.5; .5 MG/3ML; MG/3ML
1 SOLUTION RESPIRATORY (INHALATION) 4 TIMES DAILY
COMMUNITY

## 2017-01-01 RX ORDER — SODIUM CHLORIDE 0.9 % (FLUSH) 0.9 %
10 SYRINGE (ML) INJECTION PRN
Status: DISCONTINUED | OUTPATIENT
Start: 2017-01-01 | End: 2017-01-01 | Stop reason: HOSPADM

## 2017-01-01 RX ORDER — GENTAMICIN SULFATE 3 MG/ML
1 SOLUTION/ DROPS OPHTHALMIC 3 TIMES DAILY
Status: DISCONTINUED | OUTPATIENT
Start: 2017-01-01 | End: 2017-01-01 | Stop reason: HOSPADM

## 2017-01-01 RX ORDER — NITROGLYCERIN 0.4 MG/1
0.4 TABLET SUBLINGUAL EVERY 5 MIN PRN
COMMUNITY

## 2017-01-01 RX ORDER — ALBUTEROL SULFATE 2.5 MG/3ML
2.5 SOLUTION RESPIRATORY (INHALATION) PRN
Status: DISCONTINUED | OUTPATIENT
Start: 2017-01-01 | End: 2017-01-01

## 2017-01-01 RX ORDER — SIMVASTATIN 40 MG
40 TABLET ORAL NIGHTLY
Status: CANCELLED | OUTPATIENT
Start: 2017-01-01

## 2017-01-01 RX ORDER — MEMANTINE HYDROCHLORIDE 5 MG/1
5 TABLET ORAL 2 TIMES DAILY
COMMUNITY

## 2017-01-01 RX ORDER — SODIUM CHLORIDE 0.9 % (FLUSH) 0.9 %
10 SYRINGE (ML) INJECTION EVERY 12 HOURS SCHEDULED
Status: DISCONTINUED | OUTPATIENT
Start: 2017-01-01 | End: 2017-01-01 | Stop reason: SDUPTHER

## 2017-01-01 RX ORDER — SIMVASTATIN 40 MG
40 TABLET ORAL NIGHTLY
COMMUNITY

## 2017-01-01 RX ORDER — QUETIAPINE FUMARATE 25 MG/1
12.5 TABLET, FILM COATED ORAL NIGHTLY PRN
Status: DISCONTINUED | OUTPATIENT
Start: 2017-01-01 | End: 2017-01-01 | Stop reason: HOSPADM

## 2017-01-01 RX ORDER — MEMANTINE HYDROCHLORIDE 5 MG/1
5 TABLET ORAL 2 TIMES DAILY
Status: CANCELLED | OUTPATIENT
Start: 2017-01-01

## 2017-01-01 RX ORDER — BUDESONIDE AND FORMOTEROL FUMARATE DIHYDRATE 160; 4.5 UG/1; UG/1
2 AEROSOL RESPIRATORY (INHALATION) 2 TIMES DAILY
COMMUNITY

## 2017-01-01 RX ORDER — MEMANTINE HYDROCHLORIDE 5 MG/1
5 TABLET ORAL 2 TIMES DAILY
Status: DISCONTINUED | OUTPATIENT
Start: 2017-01-01 | End: 2017-01-01 | Stop reason: HOSPADM

## 2017-01-01 RX ORDER — ALLOPURINOL 100 MG/1
100 TABLET ORAL DAILY
Status: CANCELLED | OUTPATIENT
Start: 2017-01-01

## 2017-01-01 RX ORDER — POTASSIUM CHLORIDE 750 MG/1
20 CAPSULE, EXTENDED RELEASE ORAL 2 TIMES DAILY
Status: DISCONTINUED | OUTPATIENT
Start: 2017-01-01 | End: 2017-01-01 | Stop reason: HOSPADM

## 2017-01-01 RX ORDER — ASPIRIN 81 MG/1
81 TABLET ORAL DAILY
Status: DISCONTINUED | OUTPATIENT
Start: 2017-01-01 | End: 2017-01-01 | Stop reason: HOSPADM

## 2017-01-01 RX ORDER — ALBUTEROL SULFATE 2.5 MG/3ML
2.5 SOLUTION RESPIRATORY (INHALATION)
Status: DISCONTINUED | OUTPATIENT
Start: 2017-01-01 | End: 2017-01-01

## 2017-01-01 RX ORDER — OMEPRAZOLE 20 MG/1
20 CAPSULE, DELAYED RELEASE ORAL DAILY
COMMUNITY

## 2017-01-01 RX ORDER — CEFUROXIME AXETIL 500 MG/1
500 TABLET ORAL 2 TIMES DAILY
Status: ON HOLD | COMMUNITY
End: 2017-01-01 | Stop reason: ALTCHOICE

## 2017-01-01 RX ORDER — CHLORAL HYDRATE 500 MG
3000 CAPSULE ORAL NIGHTLY
Status: DISCONTINUED | OUTPATIENT
Start: 2017-01-01 | End: 2017-01-01 | Stop reason: HOSPADM

## 2017-01-01 RX ORDER — DONEPEZIL HYDROCHLORIDE 5 MG/1
10 TABLET, FILM COATED ORAL NIGHTLY
Status: DISCONTINUED | OUTPATIENT
Start: 2017-01-01 | End: 2017-01-01 | Stop reason: HOSPADM

## 2017-01-01 RX ORDER — IPRATROPIUM BROMIDE AND ALBUTEROL SULFATE 2.5; .5 MG/3ML; MG/3ML
1 SOLUTION RESPIRATORY (INHALATION) 4 TIMES DAILY
Status: CANCELLED | OUTPATIENT
Start: 2017-01-01

## 2017-01-01 RX ORDER — METHYLPREDNISOLONE SODIUM SUCCINATE 40 MG/ML
20 INJECTION, POWDER, LYOPHILIZED, FOR SOLUTION INTRAMUSCULAR; INTRAVENOUS EVERY 8 HOURS
Status: DISCONTINUED | OUTPATIENT
Start: 2017-01-01 | End: 2017-01-01

## 2017-01-01 RX ORDER — MIRTAZAPINE 15 MG/1
30 TABLET, FILM COATED ORAL NIGHTLY
Status: CANCELLED | OUTPATIENT
Start: 2017-01-01

## 2017-01-01 RX ORDER — CHLORAL HYDRATE 500 MG
3000 CAPSULE ORAL NIGHTLY
Status: CANCELLED | OUTPATIENT
Start: 2017-01-01

## 2017-01-01 RX ORDER — DOCUSATE SODIUM 100 MG/1
100 CAPSULE, LIQUID FILLED ORAL 2 TIMES DAILY
Status: CANCELLED | OUTPATIENT
Start: 2017-01-01

## 2017-01-01 RX ORDER — METHYLPREDNISOLONE SODIUM SUCCINATE 125 MG/2ML
125 INJECTION, POWDER, LYOPHILIZED, FOR SOLUTION INTRAMUSCULAR; INTRAVENOUS ONCE
Status: COMPLETED | OUTPATIENT
Start: 2017-01-01 | End: 2017-01-01

## 2017-01-01 RX ORDER — VITS A,C,E/LUTEIN/MINERALS 300MCG-200
1 TABLET ORAL 2 TIMES DAILY
Status: CANCELLED | OUTPATIENT
Start: 2017-01-01

## 2017-01-01 RX ORDER — DEXTRAN 70 AND HYPROMELLOSE 2910 1; 3 MG/ML; MG/ML
1 SOLUTION/ DROPS OPHTHALMIC 4 TIMES DAILY PRN
Status: CANCELLED | OUTPATIENT
Start: 2017-01-01

## 2017-01-01 RX ORDER — CEFUROXIME AXETIL 500 MG/1
500 TABLET ORAL 2 TIMES DAILY
DISCHARGE
Start: 2017-01-01 | End: 2017-01-01 | Stop reason: SDUPTHER

## 2017-01-01 RX ORDER — GUAIFENESIN/DEXTROMETHORPHAN 100-10MG/5
5 SYRUP ORAL EVERY 4 HOURS PRN
Qty: 120 ML | DISCHARGE
Start: 2017-01-01 | End: 2017-01-01 | Stop reason: SDUPTHER

## 2017-01-01 RX ORDER — METHYLPREDNISOLONE SODIUM SUCCINATE 125 MG/2ML
60 INJECTION, POWDER, LYOPHILIZED, FOR SOLUTION INTRAMUSCULAR; INTRAVENOUS EVERY 6 HOURS
Status: DISCONTINUED | OUTPATIENT
Start: 2017-01-01 | End: 2017-01-01

## 2017-01-01 RX ORDER — VITS A,C,E/LUTEIN/MINERALS 300MCG-200
1 TABLET ORAL 2 TIMES DAILY
COMMUNITY

## 2017-01-01 RX ORDER — GENTAMICIN SULFATE 3 MG/ML
1 SOLUTION/ DROPS OPHTHALMIC 3 TIMES DAILY
Status: CANCELLED | OUTPATIENT
Start: 2017-01-01 | End: 2017-01-01

## 2017-01-01 RX ORDER — CEFUROXIME AXETIL 500 MG/1
500 TABLET ORAL 2 TIMES DAILY
Status: DISCONTINUED | OUTPATIENT
Start: 2017-01-01 | End: 2017-01-01 | Stop reason: HOSPADM

## 2017-01-01 RX ADMIN — GENTAMICIN SULFATE 1 DROP: 3 SOLUTION/ DROPS OPHTHALMIC at 09:38

## 2017-01-01 RX ADMIN — IPRATROPIUM BROMIDE AND ALBUTEROL SULFATE 3 ML: 2.5; .5 SOLUTION RESPIRATORY (INHALATION) at 20:39

## 2017-01-01 RX ADMIN — SODIUM CHLORIDE, PRESERVATIVE FREE 10 ML: 5 INJECTION INTRAVENOUS at 11:02

## 2017-01-01 RX ADMIN — AMOXICILLIN AND CLAVULANATE POTASSIUM 1 TABLET: 875; 125 TABLET, FILM COATED ORAL at 08:17

## 2017-01-01 RX ADMIN — QUETIAPINE FUMARATE 12.5 MG: 25 TABLET ORAL at 12:05

## 2017-01-01 RX ADMIN — Medication 3000 MG: at 21:59

## 2017-01-01 RX ADMIN — IPRATROPIUM BROMIDE AND ALBUTEROL SULFATE 1 AMPULE: 2.5; .5 SOLUTION RESPIRATORY (INHALATION) at 05:26

## 2017-01-01 RX ADMIN — MIRTAZAPINE 30 MG: 15 TABLET, FILM COATED ORAL at 22:22

## 2017-01-01 RX ADMIN — DOCUSATE SODIUM 100 MG: 100 CAPSULE, LIQUID FILLED ORAL at 09:19

## 2017-01-01 RX ADMIN — ASPIRIN 81 MG 81 MG: 81 TABLET ORAL at 08:34

## 2017-01-01 RX ADMIN — IPRATROPIUM BROMIDE AND ALBUTEROL SULFATE 1 AMPULE: 2.5; .5 SOLUTION RESPIRATORY (INHALATION) at 20:45

## 2017-01-01 RX ADMIN — IPRATROPIUM BROMIDE AND ALBUTEROL SULFATE 3 ML: 2.5; .5 SOLUTION RESPIRATORY (INHALATION) at 11:23

## 2017-01-01 RX ADMIN — GENTAMICIN SULFATE 1 DROP: 3 SOLUTION/ DROPS OPHTHALMIC at 13:38

## 2017-01-01 RX ADMIN — IPRATROPIUM BROMIDE AND ALBUTEROL SULFATE 1 AMPULE: 2.5; .5 SOLUTION RESPIRATORY (INHALATION) at 14:38

## 2017-01-01 RX ADMIN — ENOXAPARIN SODIUM 40 MG: 40 INJECTION SUBCUTANEOUS at 09:18

## 2017-01-01 RX ADMIN — SERTRALINE HYDROCHLORIDE 50 MG: 50 TABLET ORAL at 10:30

## 2017-01-01 RX ADMIN — MEMANTINE 5 MG: 10 TABLET ORAL at 08:35

## 2017-01-01 RX ADMIN — MEMANTINE 5 MG: 10 TABLET ORAL at 21:56

## 2017-01-01 RX ADMIN — SODIUM CHLORIDE, PRESERVATIVE FREE 10 ML: 5 INJECTION INTRAVENOUS at 21:11

## 2017-01-01 RX ADMIN — ASPIRIN 81 MG 81 MG: 81 TABLET ORAL at 08:23

## 2017-01-01 RX ADMIN — SIMVASTATIN 40 MG: 40 TABLET, FILM COATED ORAL at 22:28

## 2017-01-01 RX ADMIN — LATANOPROST 1 DROP: 50 SOLUTION OPHTHALMIC at 20:56

## 2017-01-01 RX ADMIN — DOCUSATE SODIUM 100 MG: 100 CAPSULE, LIQUID FILLED ORAL at 23:22

## 2017-01-01 RX ADMIN — MEMANTINE 5 MG: 10 TABLET ORAL at 11:02

## 2017-01-01 RX ADMIN — Medication 2 PUFF: at 17:23

## 2017-01-01 RX ADMIN — AMOXICILLIN AND CLAVULANATE POTASSIUM 1 TABLET: 875; 125 TABLET, FILM COATED ORAL at 10:29

## 2017-01-01 RX ADMIN — BARIUM SULFATE 80 ML: 400 SUSPENSION ORAL at 15:03

## 2017-01-01 RX ADMIN — MEMANTINE 5 MG: 10 TABLET ORAL at 08:13

## 2017-01-01 RX ADMIN — I-VITE, TAB 1000-60-2MG (60/BT) 1 TABLET: TAB at 11:02

## 2017-01-01 RX ADMIN — ALLOPURINOL 100 MG: 100 TABLET ORAL at 10:28

## 2017-01-01 RX ADMIN — MEMANTINE 5 MG: 10 TABLET ORAL at 09:39

## 2017-01-01 RX ADMIN — PIPERACILLIN SODIUM AND TAZOBACTAM SODIUM 3.38 G: 3; .375 INJECTION, POWDER, LYOPHILIZED, FOR SOLUTION INTRAVENOUS at 21:14

## 2017-01-01 RX ADMIN — IPRATROPIUM BROMIDE AND ALBUTEROL SULFATE 1 AMPULE: .5; 3 SOLUTION RESPIRATORY (INHALATION) at 21:30

## 2017-01-01 RX ADMIN — Medication 2 PUFF: at 04:47

## 2017-01-01 RX ADMIN — SERTRALINE HYDROCHLORIDE 50 MG: 50 TABLET ORAL at 09:04

## 2017-01-01 RX ADMIN — PANTOPRAZOLE SODIUM 40 MG: 40 TABLET, DELAYED RELEASE ORAL at 05:50

## 2017-01-01 RX ADMIN — IPRATROPIUM BROMIDE AND ALBUTEROL SULFATE 1 AMPULE: 2.5; .5 SOLUTION RESPIRATORY (INHALATION) at 06:05

## 2017-01-01 RX ADMIN — I-VITE, TAB 1000-60-2MG (60/BT) 1 TABLET: TAB at 09:19

## 2017-01-01 RX ADMIN — SODIUM CHLORIDE, PRESERVATIVE FREE 10 ML: 5 INJECTION INTRAVENOUS at 22:48

## 2017-01-01 RX ADMIN — PIPERACILLIN SODIUM AND TAZOBACTAM SODIUM 3.38 G: 3; .375 INJECTION, POWDER, LYOPHILIZED, FOR SOLUTION INTRAVENOUS at 05:57

## 2017-01-01 RX ADMIN — ATENOLOL 25 MG: 25 TABLET ORAL at 08:18

## 2017-01-01 RX ADMIN — I-VITE, TAB 1000-60-2MG (60/BT) 1 TABLET: TAB at 21:37

## 2017-01-01 RX ADMIN — POTASSIUM CHLORIDE 20 MEQ: 750 CAPSULE, EXTENDED RELEASE ORAL at 19:24

## 2017-01-01 RX ADMIN — Medication 2 PUFF: at 08:23

## 2017-01-01 RX ADMIN — AMOXICILLIN AND CLAVULANATE POTASSIUM 1 TABLET: 875; 125 TABLET, FILM COATED ORAL at 20:17

## 2017-01-01 RX ADMIN — QUETIAPINE FUMARATE 12.5 MG: 25 TABLET ORAL at 22:48

## 2017-01-01 RX ADMIN — ENOXAPARIN SODIUM 40 MG: 40 INJECTION SUBCUTANEOUS at 21:10

## 2017-01-01 RX ADMIN — POTASSIUM CHLORIDE 20 MEQ: 750 CAPSULE, EXTENDED RELEASE ORAL at 10:40

## 2017-01-01 RX ADMIN — SERTRALINE 50 MG: 50 TABLET, FILM COATED ORAL at 09:00

## 2017-01-01 RX ADMIN — MEMANTINE HYDROCHLORIDE 5 MG: 5 TABLET ORAL at 10:41

## 2017-01-01 RX ADMIN — METHYLPREDNISOLONE SODIUM SUCCINATE 125 MG: 125 INJECTION, POWDER, FOR SOLUTION INTRAMUSCULAR; INTRAVENOUS at 12:42

## 2017-01-01 RX ADMIN — OXYMETAZOLINE HYDROCHLORIDE 2 SPRAY: 5 SPRAY NASAL at 23:25

## 2017-01-01 RX ADMIN — DONEPEZIL HYDROCHLORIDE 10 MG: 5 TABLET, FILM COATED ORAL at 21:44

## 2017-01-01 RX ADMIN — ALBUTEROL SULFATE 2.5 MG: 2.5 SOLUTION RESPIRATORY (INHALATION) at 12:47

## 2017-01-01 RX ADMIN — AMOXICILLIN AND CLAVULANATE POTASSIUM 1 TABLET: 875; 125 TABLET, FILM COATED ORAL at 08:14

## 2017-01-01 RX ADMIN — PANTOPRAZOLE SODIUM 40 MG: 40 TABLET, DELAYED RELEASE ORAL at 06:00

## 2017-01-01 RX ADMIN — LATANOPROST 1 DROP: 50 SOLUTION OPHTHALMIC at 19:23

## 2017-01-01 RX ADMIN — DOCUSATE SODIUM 100 MG: 100 CAPSULE, LIQUID FILLED ORAL at 22:22

## 2017-01-01 RX ADMIN — OXYMETAZOLINE HYDROCHLORIDE 2 SPRAY: 5 SPRAY NASAL at 08:17

## 2017-01-01 RX ADMIN — IPRATROPIUM BROMIDE AND ALBUTEROL SULFATE 3 ML: .5; 3 SOLUTION RESPIRATORY (INHALATION) at 15:31

## 2017-01-01 RX ADMIN — MIRTAZAPINE 30 MG: 30 TABLET, FILM COATED ORAL at 20:56

## 2017-01-01 RX ADMIN — DOCUSATE SODIUM 100 MG: 100 CAPSULE, LIQUID FILLED ORAL at 19:25

## 2017-01-01 RX ADMIN — SIMVASTATIN 40 MG: 40 TABLET, FILM COATED ORAL at 21:56

## 2017-01-01 RX ADMIN — ATENOLOL 25 MG: 25 TABLET ORAL at 11:02

## 2017-01-01 RX ADMIN — LATANOPROST 1 DROP: 50 SOLUTION OPHTHALMIC at 23:24

## 2017-01-01 RX ADMIN — I-VITE, TAB 1000-60-2MG (60/BT) 1 TABLET: TAB at 20:23

## 2017-01-01 RX ADMIN — POTASSIUM CHLORIDE 20 MEQ: 750 CAPSULE, EXTENDED RELEASE ORAL at 09:13

## 2017-01-01 RX ADMIN — Medication 2 PUFF: at 06:57

## 2017-01-01 RX ADMIN — IPRATROPIUM BROMIDE AND ALBUTEROL SULFATE 1 AMPULE: 2.5; .5 SOLUTION RESPIRATORY (INHALATION) at 11:54

## 2017-01-01 RX ADMIN — POTASSIUM CHLORIDE 20 MEQ: 750 CAPSULE, EXTENDED RELEASE ORAL at 09:39

## 2017-01-01 RX ADMIN — POTASSIUM CHLORIDE 20 MEQ: 750 CAPSULE, EXTENDED RELEASE ORAL at 22:22

## 2017-01-01 RX ADMIN — IPRATROPIUM BROMIDE AND ALBUTEROL SULFATE 3 ML: 2.5; .5 SOLUTION RESPIRATORY (INHALATION) at 05:19

## 2017-01-01 RX ADMIN — IPRATROPIUM BROMIDE AND ALBUTEROL SULFATE 3 ML: .5; 3 SOLUTION RESPIRATORY (INHALATION) at 12:13

## 2017-01-01 RX ADMIN — IPRATROPIUM BROMIDE AND ALBUTEROL SULFATE 3 ML: .5; 3 SOLUTION RESPIRATORY (INHALATION) at 06:56

## 2017-01-01 RX ADMIN — Medication 2 PUFF: at 05:27

## 2017-01-01 RX ADMIN — I-VITE, TAB 1000-60-2MG (60/BT) 1 TABLET: TAB at 20:12

## 2017-01-01 RX ADMIN — OXYMETAZOLINE HYDROCHLORIDE 2 SPRAY: 5 SPRAY NASAL at 10:27

## 2017-01-01 RX ADMIN — MIRTAZAPINE 30 MG: 15 TABLET, FILM COATED ORAL at 21:37

## 2017-01-01 RX ADMIN — ASPIRIN 81 MG 81 MG: 81 TABLET ORAL at 08:18

## 2017-01-01 RX ADMIN — SERTRALINE HYDROCHLORIDE 50 MG: 50 TABLET ORAL at 08:19

## 2017-01-01 RX ADMIN — Medication 2 PUFF: at 09:46

## 2017-01-01 RX ADMIN — ATENOLOL 25 MG: 25 TABLET ORAL at 10:40

## 2017-01-01 RX ADMIN — DONEPEZIL HYDROCHLORIDE 10 MG: 5 TABLET, FILM COATED ORAL at 19:21

## 2017-01-01 RX ADMIN — MIRTAZAPINE 30 MG: 15 TABLET, FILM COATED ORAL at 19:21

## 2017-01-01 RX ADMIN — ATENOLOL 25 MG: 25 TABLET ORAL at 09:04

## 2017-01-01 RX ADMIN — IPRATROPIUM BROMIDE AND ALBUTEROL SULFATE 3 ML: .5; 3 SOLUTION RESPIRATORY (INHALATION) at 19:57

## 2017-01-01 RX ADMIN — DOCUSATE SODIUM 100 MG: 100 CAPSULE, LIQUID FILLED ORAL at 11:03

## 2017-01-01 RX ADMIN — LATANOPROST 1 DROP: 50 SOLUTION OPHTHALMIC at 23:12

## 2017-01-01 RX ADMIN — POTASSIUM CHLORIDE 20 MEQ: 750 CAPSULE, EXTENDED RELEASE ORAL at 21:37

## 2017-01-01 RX ADMIN — Medication 2 PUFF: at 18:06

## 2017-01-01 RX ADMIN — MEMANTINE HYDROCHLORIDE 5 MG: 5 TABLET ORAL at 09:14

## 2017-01-01 RX ADMIN — GENTAMICIN SULFATE 1 DROP: 3 SOLUTION/ DROPS OPHTHALMIC at 22:00

## 2017-01-01 RX ADMIN — DONEPEZIL HYDROCHLORIDE 10 MG: 5 TABLET, FILM COATED ORAL at 20:17

## 2017-01-01 RX ADMIN — PANTOPRAZOLE SODIUM 40 MG: 40 TABLET, DELAYED RELEASE ORAL at 05:17

## 2017-01-01 RX ADMIN — MEMANTINE 5 MG: 10 TABLET ORAL at 21:37

## 2017-01-01 RX ADMIN — ASPIRIN 81 MG: 81 TABLET, COATED ORAL at 10:27

## 2017-01-01 RX ADMIN — DOCUSATE SODIUM 100 MG: 100 CAPSULE, LIQUID FILLED ORAL at 09:18

## 2017-01-01 RX ADMIN — POTASSIUM CHLORIDE 20 MEQ: 750 CAPSULE, EXTENDED RELEASE ORAL at 08:22

## 2017-01-01 RX ADMIN — I-VITE, TAB 1000-60-2MG (60/BT) 1 TABLET: TAB at 22:18

## 2017-01-01 RX ADMIN — ALLOPURINOL 100 MG: 100 TABLET ORAL at 21:12

## 2017-01-01 RX ADMIN — IPRATROPIUM BROMIDE AND ALBUTEROL SULFATE 1 AMPULE: .5; 3 SOLUTION RESPIRATORY (INHALATION) at 16:02

## 2017-01-01 RX ADMIN — AMOXICILLIN AND CLAVULANATE POTASSIUM 1 TABLET: 875; 125 TABLET, FILM COATED ORAL at 09:40

## 2017-01-01 RX ADMIN — MIRTAZAPINE 30 MG: 15 TABLET, FILM COATED ORAL at 22:27

## 2017-01-01 RX ADMIN — IPRATROPIUM BROMIDE AND ALBUTEROL SULFATE 3 ML: .5; 3 SOLUTION RESPIRATORY (INHALATION) at 19:37

## 2017-01-01 RX ADMIN — I-VITE, TAB 1000-60-2MG (60/BT) 1 TABLET: TAB at 21:46

## 2017-01-01 RX ADMIN — ENOXAPARIN SODIUM 40 MG: 40 INJECTION SUBCUTANEOUS at 22:23

## 2017-01-01 RX ADMIN — MEMANTINE HYDROCHLORIDE 5 MG: 5 TABLET, FILM COATED ORAL at 10:27

## 2017-01-01 RX ADMIN — DOCUSATE SODIUM 100 MG: 100 CAPSULE, LIQUID FILLED ORAL at 21:46

## 2017-01-01 RX ADMIN — Medication 3000 MG: at 23:23

## 2017-01-01 RX ADMIN — ENOXAPARIN SODIUM 40 MG: 100 INJECTION SUBCUTANEOUS at 20:23

## 2017-01-01 RX ADMIN — CYANOCOBALAMIN TAB 500 MCG 1000 MCG: 500 TAB at 09:18

## 2017-01-01 RX ADMIN — DONEPEZIL HYDROCHLORIDE 10 MG: 5 TABLET, FILM COATED ORAL at 23:22

## 2017-01-01 RX ADMIN — AMOXICILLIN AND CLAVULANATE POTASSIUM 1 TABLET: 875; 125 TABLET, FILM COATED ORAL at 09:39

## 2017-01-01 RX ADMIN — LATANOPROST 1 DROP: 50 SOLUTION OPHTHALMIC at 21:54

## 2017-01-01 RX ADMIN — Medication 10 ML: at 01:24

## 2017-01-01 RX ADMIN — I-VITE, TAB 1000-60-2MG (60/BT) 1 TABLET: TAB at 21:55

## 2017-01-01 RX ADMIN — ATENOLOL 25 MG: 25 TABLET ORAL at 08:34

## 2017-01-01 RX ADMIN — DOCUSATE SODIUM 100 MG: 100 CAPSULE, LIQUID FILLED ORAL at 09:40

## 2017-01-01 RX ADMIN — IPRATROPIUM BROMIDE AND ALBUTEROL SULFATE 1 AMPULE: .5; 3 SOLUTION RESPIRATORY (INHALATION) at 18:02

## 2017-01-01 RX ADMIN — ALLOPURINOL 100 MG: 100 TABLET ORAL at 09:40

## 2017-01-01 RX ADMIN — MEMANTINE 5 MG: 10 TABLET ORAL at 19:24

## 2017-01-01 RX ADMIN — POTASSIUM CHLORIDE 20 MEQ: 750 CAPSULE, EXTENDED RELEASE ORAL at 09:04

## 2017-01-01 RX ADMIN — LATANOPROST 1 DROP: 50 SOLUTION OPHTHALMIC at 22:47

## 2017-01-01 RX ADMIN — MEMANTINE 5 MG: 10 TABLET ORAL at 09:42

## 2017-01-01 RX ADMIN — DONEPEZIL HYDROCHLORIDE 10 MG: 10 TABLET, FILM COATED ORAL at 20:34

## 2017-01-01 RX ADMIN — ENOXAPARIN SODIUM 40 MG: 100 INJECTION SUBCUTANEOUS at 20:18

## 2017-01-01 RX ADMIN — OXYMETAZOLINE HYDROCHLORIDE 2 SPRAY: 5 SPRAY NASAL at 21:45

## 2017-01-01 RX ADMIN — SIMVASTATIN 40 MG: 40 TABLET, FILM COATED ORAL at 20:12

## 2017-01-01 RX ADMIN — ALLOPURINOL 100 MG: 100 TABLET ORAL at 09:04

## 2017-01-01 RX ADMIN — ALLOPURINOL 100 MG: 100 TABLET ORAL at 10:19

## 2017-01-01 RX ADMIN — IPRATROPIUM BROMIDE AND ALBUTEROL SULFATE 1 AMPULE: 2.5; .5 SOLUTION RESPIRATORY (INHALATION) at 11:19

## 2017-01-01 RX ADMIN — ASPIRIN 81 MG 81 MG: 81 TABLET ORAL at 11:04

## 2017-01-01 RX ADMIN — SERTRALINE HYDROCHLORIDE 50 MG: 50 TABLET ORAL at 10:19

## 2017-01-01 RX ADMIN — OXYMETAZOLINE HYDROCHLORIDE 2 SPRAY: 5 SPRAY NASAL at 20:23

## 2017-01-01 RX ADMIN — ALLOPURINOL 100 MG: 100 TABLET ORAL at 10:41

## 2017-01-01 RX ADMIN — VITAMIN D, TAB 1000IU (100/BT) 2000 UNITS: 25 TAB at 10:19

## 2017-01-01 RX ADMIN — ENOXAPARIN SODIUM 40 MG: 100 INJECTION SUBCUTANEOUS at 21:58

## 2017-01-01 RX ADMIN — Medication 2 PUFF: at 05:19

## 2017-01-01 RX ADMIN — POTASSIUM CHLORIDE 20 MEQ: 750 CAPSULE, EXTENDED RELEASE ORAL at 11:03

## 2017-01-01 RX ADMIN — PANTOPRAZOLE SODIUM 40 MG: 40 TABLET, DELAYED RELEASE ORAL at 06:02

## 2017-01-01 RX ADMIN — ENOXAPARIN SODIUM 40 MG: 100 INJECTION SUBCUTANEOUS at 21:54

## 2017-01-01 RX ADMIN — IPRATROPIUM BROMIDE AND ALBUTEROL SULFATE 1 AMPULE: 2.5; .5 SOLUTION RESPIRATORY (INHALATION) at 13:56

## 2017-01-01 RX ADMIN — Medication 10 ML: at 08:54

## 2017-01-01 RX ADMIN — PANTOPRAZOLE SODIUM 40 MG: 40 TABLET, DELAYED RELEASE ORAL at 06:04

## 2017-01-01 RX ADMIN — IPRATROPIUM BROMIDE AND ALBUTEROL SULFATE 1 AMPULE: 2.5; .5 SOLUTION RESPIRATORY (INHALATION) at 19:31

## 2017-01-01 RX ADMIN — IPRATROPIUM BROMIDE AND ALBUTEROL SULFATE 1 AMPULE: .5; 3 SOLUTION RESPIRATORY (INHALATION) at 11:49

## 2017-01-01 RX ADMIN — POTASSIUM CHLORIDE 20 MEQ: 750 CAPSULE, EXTENDED RELEASE ORAL at 19:22

## 2017-01-01 RX ADMIN — POTASSIUM CHLORIDE 20 MEQ: 750 CAPSULE, EXTENDED RELEASE ORAL at 20:17

## 2017-01-01 RX ADMIN — IPRATROPIUM BROMIDE AND ALBUTEROL SULFATE 1 AMPULE: .5; 3 SOLUTION RESPIRATORY (INHALATION) at 05:24

## 2017-01-01 RX ADMIN — SERTRALINE HYDROCHLORIDE 50 MG: 50 TABLET ORAL at 11:01

## 2017-01-01 RX ADMIN — ALLOPURINOL 100 MG: 100 TABLET ORAL at 08:34

## 2017-01-01 RX ADMIN — POTASSIUM CHLORIDE 20 MEQ: 750 CAPSULE, EXTENDED RELEASE ORAL at 09:19

## 2017-01-01 RX ADMIN — GENTAMICIN SULFATE 1 DROP: 3 SOLUTION/ DROPS OPHTHALMIC at 13:25

## 2017-01-01 RX ADMIN — LATANOPROST 1 DROP: 50 SOLUTION OPHTHALMIC at 20:36

## 2017-01-01 RX ADMIN — METHYLPREDNISOLONE SODIUM SUCCINATE 60 MG: 125 INJECTION, POWDER, FOR SOLUTION INTRAMUSCULAR; INTRAVENOUS at 22:57

## 2017-01-01 RX ADMIN — SERTRALINE HYDROCHLORIDE 50 MG: 50 TABLET ORAL at 10:27

## 2017-01-01 RX ADMIN — AMOXICILLIN AND CLAVULANATE POTASSIUM 1 TABLET: 875; 125 TABLET, FILM COATED ORAL at 21:44

## 2017-01-01 RX ADMIN — AMOXICILLIN AND CLAVULANATE POTASSIUM 1 TABLET: 875; 125 TABLET, FILM COATED ORAL at 21:55

## 2017-01-01 RX ADMIN — ATENOLOL 25 MG: 25 TABLET ORAL at 09:40

## 2017-01-01 RX ADMIN — PIPERACILLIN SODIUM AND TAZOBACTAM SODIUM 3.38 G: 3; .375 INJECTION, POWDER, LYOPHILIZED, FOR SOLUTION INTRAVENOUS at 22:36

## 2017-01-01 RX ADMIN — ASPIRIN 81 MG 81 MG: 81 TABLET ORAL at 09:41

## 2017-01-01 RX ADMIN — Medication 2 PUFF: at 18:03

## 2017-01-01 RX ADMIN — ATENOLOL 25 MG: 25 TABLET ORAL at 09:20

## 2017-01-01 RX ADMIN — SERTRALINE 50 MG: 50 TABLET, FILM COATED ORAL at 11:03

## 2017-01-01 RX ADMIN — Medication 3000 MG: at 19:24

## 2017-01-01 RX ADMIN — ASPIRIN 81 MG 81 MG: 81 TABLET ORAL at 09:20

## 2017-01-01 RX ADMIN — MEMANTINE 5 MG: 10 TABLET ORAL at 09:19

## 2017-01-01 RX ADMIN — Medication 3000 MG: at 21:11

## 2017-01-01 RX ADMIN — DOCUSATE SODIUM 100 MG: 100 CAPSULE, LIQUID FILLED ORAL at 11:02

## 2017-01-01 RX ADMIN — POTASSIUM CHLORIDE 20 MEQ: 20 TABLET, EXTENDED RELEASE ORAL at 10:19

## 2017-01-01 RX ADMIN — SERTRALINE HYDROCHLORIDE 50 MG: 50 TABLET ORAL at 09:19

## 2017-01-01 RX ADMIN — IPRATROPIUM BROMIDE AND ALBUTEROL SULFATE 3 ML: .5; 3 SOLUTION RESPIRATORY (INHALATION) at 10:55

## 2017-01-01 RX ADMIN — IPRATROPIUM BROMIDE AND ALBUTEROL SULFATE 1 AMPULE: .5; 3 SOLUTION RESPIRATORY (INHALATION) at 14:05

## 2017-01-01 RX ADMIN — ASPIRIN 81 MG 81 MG: 81 TABLET ORAL at 10:28

## 2017-01-01 RX ADMIN — I-VITE, TAB 1000-60-2MG (60/BT) 1 TABLET: TAB at 22:22

## 2017-01-01 RX ADMIN — SIMVASTATIN 40 MG: 40 TABLET, FILM COATED ORAL at 23:21

## 2017-01-01 RX ADMIN — SIMVASTATIN 40 MG: 40 TABLET, FILM COATED ORAL at 20:17

## 2017-01-01 RX ADMIN — PANTOPRAZOLE SODIUM 40 MG: 40 TABLET, DELAYED RELEASE ORAL at 05:16

## 2017-01-01 RX ADMIN — Medication 2 PUFF: at 05:16

## 2017-01-01 RX ADMIN — Medication 1 TABLET: at 11:19

## 2017-01-01 RX ADMIN — OXYMETAZOLINE HYDROCHLORIDE 2 SPRAY: 5 SPRAY NASAL at 12:47

## 2017-01-01 RX ADMIN — IPRATROPIUM BROMIDE AND ALBUTEROL SULFATE 3 ML: .5; 3 SOLUTION RESPIRATORY (INHALATION) at 07:37

## 2017-01-01 RX ADMIN — ENOXAPARIN SODIUM 40 MG: 100 INJECTION SUBCUTANEOUS at 21:45

## 2017-01-01 RX ADMIN — IPRATROPIUM BROMIDE AND ALBUTEROL SULFATE 3 ML: 2.5; .5 SOLUTION RESPIRATORY (INHALATION) at 10:38

## 2017-01-01 RX ADMIN — Medication 3000 MG: at 19:21

## 2017-01-01 RX ADMIN — MEMANTINE 5 MG: 10 TABLET ORAL at 09:04

## 2017-01-01 RX ADMIN — CYANOCOBALAMIN TAB 500 MCG 1000 MCG: 500 TAB at 10:27

## 2017-01-01 RX ADMIN — OXYMETAZOLINE HYDROCHLORIDE 2 SPRAY: 5 SPRAY NASAL at 09:22

## 2017-01-01 RX ADMIN — AMOXICILLIN AND CLAVULANATE POTASSIUM 1 TABLET: 875; 125 TABLET, FILM COATED ORAL at 21:54

## 2017-01-01 RX ADMIN — IPRATROPIUM BROMIDE AND ALBUTEROL SULFATE 1 AMPULE: .5; 3 SOLUTION RESPIRATORY (INHALATION) at 20:40

## 2017-01-01 RX ADMIN — POTASSIUM CHLORIDE 20 MEQ: 750 CAPSULE, EXTENDED RELEASE ORAL at 21:56

## 2017-01-01 RX ADMIN — Medication 10 ML: at 09:19

## 2017-01-01 RX ADMIN — SIMVASTATIN 40 MG: 40 TABLET, FILM COATED ORAL at 21:37

## 2017-01-01 RX ADMIN — DONEPEZIL HYDROCHLORIDE 10 MG: 5 TABLET, FILM COATED ORAL at 20:22

## 2017-01-01 RX ADMIN — MIRTAZAPINE 30 MG: 15 TABLET, FILM COATED ORAL at 23:21

## 2017-01-01 RX ADMIN — MEMANTINE 5 MG: 10 TABLET ORAL at 21:55

## 2017-01-01 RX ADMIN — ASPIRIN 81 MG: 81 TABLET, COATED ORAL at 09:18

## 2017-01-01 RX ADMIN — AMOXICILLIN AND CLAVULANATE POTASSIUM 1 TABLET: 875; 125 TABLET, FILM COATED ORAL at 20:12

## 2017-01-01 RX ADMIN — IPRATROPIUM BROMIDE AND ALBUTEROL SULFATE 1 AMPULE: .5; 3 SOLUTION RESPIRATORY (INHALATION) at 22:18

## 2017-01-01 RX ADMIN — VITAMIN D, TAB 1000IU (100/BT) 2000 UNITS: 25 TAB at 09:18

## 2017-01-01 RX ADMIN — POTASSIUM CHLORIDE 20 MEQ: 20 TABLET, EXTENDED RELEASE ORAL at 20:34

## 2017-01-01 RX ADMIN — PIPERACILLIN SODIUM AND TAZOBACTAM SODIUM 3.38 G: 3; .375 INJECTION, POWDER, LYOPHILIZED, FOR SOLUTION INTRAVENOUS at 15:35

## 2017-01-01 RX ADMIN — DOCUSATE SODIUM 100 MG: 100 CAPSULE, LIQUID FILLED ORAL at 21:25

## 2017-01-01 RX ADMIN — ASPIRIN 81 MG: 81 TABLET, COATED ORAL at 10:19

## 2017-01-01 RX ADMIN — SERTRALINE HYDROCHLORIDE 50 MG: 50 TABLET ORAL at 10:28

## 2017-01-01 RX ADMIN — SIMVASTATIN 40 MG: 40 TABLET, FILM COATED ORAL at 20:56

## 2017-01-01 RX ADMIN — DOCUSATE SODIUM 100 MG: 100 CAPSULE, LIQUID FILLED ORAL at 08:18

## 2017-01-01 RX ADMIN — SIMVASTATIN 40 MG: 40 TABLET, FILM COATED ORAL at 22:23

## 2017-01-01 RX ADMIN — DONEPEZIL HYDROCHLORIDE 10 MG: 10 TABLET, FILM COATED ORAL at 21:25

## 2017-01-01 RX ADMIN — AMOXICILLIN AND CLAVULANATE POTASSIUM 1 TABLET: 875; 125 TABLET, FILM COATED ORAL at 11:01

## 2017-01-01 RX ADMIN — DOCUSATE SODIUM 100 MG: 100 CAPSULE, LIQUID FILLED ORAL at 20:11

## 2017-01-01 RX ADMIN — IPRATROPIUM BROMIDE AND ALBUTEROL SULFATE 1 AMPULE: 2.5; .5 SOLUTION RESPIRATORY (INHALATION) at 05:23

## 2017-01-01 RX ADMIN — ASPIRIN 81 MG 81 MG: 81 TABLET ORAL at 10:41

## 2017-01-01 RX ADMIN — METHYLPREDNISOLONE SODIUM SUCCINATE 60 MG: 125 INJECTION, POWDER, FOR SOLUTION INTRAMUSCULAR; INTRAVENOUS at 17:44

## 2017-01-01 RX ADMIN — Medication 3000 MG: at 21:43

## 2017-01-01 RX ADMIN — ENOXAPARIN SODIUM 40 MG: 100 INJECTION SUBCUTANEOUS at 19:25

## 2017-01-01 RX ADMIN — LATANOPROST 1 DROP: 50 SOLUTION OPHTHALMIC at 19:25

## 2017-01-01 RX ADMIN — PIPERACILLIN SODIUM,TAZOBACTAM SODIUM 3.38 G: 3; .375 INJECTION, POWDER, FOR SOLUTION INTRAVENOUS at 14:47

## 2017-01-01 RX ADMIN — DONEPEZIL HYDROCHLORIDE 10 MG: 5 TABLET, FILM COATED ORAL at 21:56

## 2017-01-01 RX ADMIN — POTASSIUM CHLORIDE 20 MEQ: 750 CAPSULE, EXTENDED RELEASE ORAL at 09:40

## 2017-01-01 RX ADMIN — Medication 2 PUFF: at 05:07

## 2017-01-01 RX ADMIN — LATANOPROST 1 DROP: 50 SOLUTION OPHTHALMIC at 21:45

## 2017-01-01 RX ADMIN — IPRATROPIUM BROMIDE AND ALBUTEROL SULFATE 3 ML: 2.5; .5 SOLUTION RESPIRATORY (INHALATION) at 05:58

## 2017-01-01 RX ADMIN — AMOXICILLIN AND CLAVULANATE POTASSIUM 1 TABLET: 875; 125 TABLET, FILM COATED ORAL at 20:22

## 2017-01-01 RX ADMIN — METHYLPREDNISOLONE SODIUM SUCCINATE 60 MG: 125 INJECTION, POWDER, FOR SOLUTION INTRAMUSCULAR; INTRAVENOUS at 06:03

## 2017-01-01 RX ADMIN — POTASSIUM CHLORIDE 20 MEQ: 750 CAPSULE, EXTENDED RELEASE ORAL at 23:23

## 2017-01-01 RX ADMIN — DOCUSATE SODIUM 100 MG: 100 CAPSULE, LIQUID FILLED ORAL at 08:34

## 2017-01-01 RX ADMIN — ASPIRIN 81 MG 81 MG: 81 TABLET ORAL at 11:03

## 2017-01-01 RX ADMIN — MEMANTINE HYDROCHLORIDE 5 MG: 5 TABLET ORAL at 21:12

## 2017-01-01 RX ADMIN — MEMANTINE HYDROCHLORIDE 5 MG: 5 TABLET ORAL at 22:22

## 2017-01-01 RX ADMIN — LATANOPROST 1 DROP: 50 SOLUTION OPHTHALMIC at 20:18

## 2017-01-01 RX ADMIN — MEMANTINE 5 MG: 10 TABLET ORAL at 21:44

## 2017-01-01 RX ADMIN — GENTAMICIN SULFATE 1 DROP: 3 SOLUTION/ DROPS OPHTHALMIC at 21:52

## 2017-01-01 RX ADMIN — POTASSIUM CHLORIDE 20 MEQ: 20 TABLET, EXTENDED RELEASE ORAL at 10:27

## 2017-01-01 RX ADMIN — I-VITE, TAB 1000-60-2MG (60/BT) 1 TABLET: TAB at 09:04

## 2017-01-01 RX ADMIN — AMOXICILLIN AND CLAVULANATE POTASSIUM 1 TABLET: 875; 125 TABLET, FILM COATED ORAL at 09:19

## 2017-01-01 RX ADMIN — DONEPEZIL HYDROCHLORIDE 10 MG: 10 TABLET, FILM COATED ORAL at 20:56

## 2017-01-01 RX ADMIN — IPRATROPIUM BROMIDE AND ALBUTEROL SULFATE 3 ML: 2.5; .5 SOLUTION RESPIRATORY (INHALATION) at 16:07

## 2017-01-01 RX ADMIN — Medication 10 ML: at 20:38

## 2017-01-01 RX ADMIN — AMOXICILLIN AND CLAVULANATE POTASSIUM 1 TABLET: 875; 125 TABLET, FILM COATED ORAL at 23:21

## 2017-01-01 RX ADMIN — Medication 2 PUFF: at 16:03

## 2017-01-01 RX ADMIN — Medication 2 PUFF: at 16:55

## 2017-01-01 RX ADMIN — Medication 2 PUFF: at 17:46

## 2017-01-01 RX ADMIN — Medication 3000 MG: at 22:23

## 2017-01-01 RX ADMIN — IPRATROPIUM BROMIDE AND ALBUTEROL SULFATE 1 AMPULE: 2.5; .5 SOLUTION RESPIRATORY (INHALATION) at 13:55

## 2017-01-01 RX ADMIN — Medication 2 PUFF: at 05:13

## 2017-01-01 RX ADMIN — ATENOLOL 25 MG: 25 TABLET ORAL at 09:18

## 2017-01-01 RX ADMIN — AMOXICILLIN AND CLAVULANATE POTASSIUM 1 TABLET: 875; 125 TABLET, FILM COATED ORAL at 09:04

## 2017-01-01 RX ADMIN — PIPERACILLIN SODIUM AND TAZOBACTAM SODIUM 3.38 G: 3; .375 INJECTION, POWDER, LYOPHILIZED, FOR SOLUTION INTRAVENOUS at 14:30

## 2017-01-01 RX ADMIN — LATANOPROST 1 DROP: 50 SOLUTION OPHTHALMIC at 22:00

## 2017-01-01 RX ADMIN — GENTAMICIN SULFATE 1 DROP: 3 SOLUTION/ DROPS OPHTHALMIC at 22:25

## 2017-01-01 RX ADMIN — SERTRALINE 50 MG: 50 TABLET, FILM COATED ORAL at 21:11

## 2017-01-01 RX ADMIN — SODIUM CHLORIDE, PRESERVATIVE FREE 10 ML: 5 INJECTION INTRAVENOUS at 10:41

## 2017-01-01 RX ADMIN — LATANOPROST 1 DROP: 50 SOLUTION OPHTHALMIC at 20:13

## 2017-01-01 RX ADMIN — ASPIRIN 81 MG 81 MG: 81 TABLET ORAL at 09:14

## 2017-01-01 RX ADMIN — ASPIRIN 81 MG 81 MG: 81 TABLET ORAL at 10:26

## 2017-01-01 RX ADMIN — I-VITE, TAB 1000-60-2MG (60/BT) 1 TABLET: TAB at 09:14

## 2017-01-01 RX ADMIN — AMOXICILLIN AND CLAVULANATE POTASSIUM 1 TABLET: 875; 125 TABLET, FILM COATED ORAL at 08:23

## 2017-01-01 RX ADMIN — MIRTAZAPINE 30 MG: 30 TABLET, FILM COATED ORAL at 20:34

## 2017-01-01 RX ADMIN — POTASSIUM CHLORIDE 20 MEQ: 750 CAPSULE, EXTENDED RELEASE ORAL at 22:05

## 2017-01-01 RX ADMIN — OXYMETAZOLINE HYDROCHLORIDE 2 SPRAY: 5 SPRAY NASAL at 20:12

## 2017-01-01 RX ADMIN — METHYLPREDNISOLONE SODIUM SUCCINATE 20 MG: 40 INJECTION, POWDER, FOR SOLUTION INTRAMUSCULAR; INTRAVENOUS at 17:42

## 2017-01-01 RX ADMIN — SIMVASTATIN 40 MG: 40 TABLET, FILM COATED ORAL at 20:34

## 2017-01-01 RX ADMIN — Medication 2 PUFF: at 20:41

## 2017-01-01 RX ADMIN — ALLOPURINOL 100 MG: 100 TABLET ORAL at 09:19

## 2017-01-01 RX ADMIN — IPRATROPIUM BROMIDE AND ALBUTEROL SULFATE 3 ML: 2.5; .5 SOLUTION RESPIRATORY (INHALATION) at 17:06

## 2017-01-01 RX ADMIN — DOCUSATE SODIUM 100 MG: 100 CAPSULE, LIQUID FILLED ORAL at 10:30

## 2017-01-01 RX ADMIN — IPRATROPIUM BROMIDE AND ALBUTEROL SULFATE 1 AMPULE: 2.5; .5 SOLUTION RESPIRATORY (INHALATION) at 15:20

## 2017-01-01 RX ADMIN — MEMANTINE 5 MG: 10 TABLET ORAL at 08:23

## 2017-01-01 RX ADMIN — AMOXICILLIN AND CLAVULANATE POTASSIUM 1 TABLET: 875; 125 TABLET, FILM COATED ORAL at 19:21

## 2017-01-01 RX ADMIN — MEMANTINE 5 MG: 10 TABLET ORAL at 20:17

## 2017-01-01 RX ADMIN — ENOXAPARIN SODIUM 40 MG: 40 INJECTION SUBCUTANEOUS at 08:54

## 2017-01-01 RX ADMIN — IPRATROPIUM BROMIDE AND ALBUTEROL SULFATE 1 AMPULE: 2.5; .5 SOLUTION RESPIRATORY (INHALATION) at 16:55

## 2017-01-01 RX ADMIN — IPRATROPIUM BROMIDE AND ALBUTEROL SULFATE 3 ML: .5; 3 SOLUTION RESPIRATORY (INHALATION) at 21:04

## 2017-01-01 RX ADMIN — IPRATROPIUM BROMIDE AND ALBUTEROL SULFATE 1 AMPULE: .5; 3 SOLUTION RESPIRATORY (INHALATION) at 11:04

## 2017-01-01 RX ADMIN — POTASSIUM CHLORIDE 20 MEQ: 750 CAPSULE, EXTENDED RELEASE ORAL at 20:11

## 2017-01-01 RX ADMIN — CEFUROXIME AXETIL 500 MG: 500 TABLET ORAL at 10:27

## 2017-01-01 RX ADMIN — IPRATROPIUM BROMIDE AND ALBUTEROL SULFATE 1 AMPULE: .5; 3 SOLUTION RESPIRATORY (INHALATION) at 15:56

## 2017-01-01 RX ADMIN — Medication 3000 MG: at 21:55

## 2017-01-01 RX ADMIN — Medication 2 PUFF: at 06:10

## 2017-01-01 RX ADMIN — SODIUM CHLORIDE, PRESERVATIVE FREE 10 ML: 5 INJECTION INTRAVENOUS at 14:41

## 2017-01-01 RX ADMIN — I-VITE, TAB 1000-60-2MG (60/BT) 1 TABLET: TAB at 21:54

## 2017-01-01 RX ADMIN — DOCUSATE SODIUM 100 MG: 100 CAPSULE, LIQUID FILLED ORAL at 09:04

## 2017-01-01 RX ADMIN — GENTAMICIN SULFATE 1 DROP: 3 SOLUTION/ DROPS OPHTHALMIC at 13:49

## 2017-01-01 RX ADMIN — LATANOPROST 1 DROP: 50 SOLUTION OPHTHALMIC at 22:05

## 2017-01-01 RX ADMIN — IPRATROPIUM BROMIDE AND ALBUTEROL SULFATE 1 AMPULE: .5; 3 SOLUTION RESPIRATORY (INHALATION) at 12:11

## 2017-01-01 RX ADMIN — PANTOPRAZOLE SODIUM 40 MG: 40 TABLET, DELAYED RELEASE ORAL at 05:33

## 2017-01-01 RX ADMIN — PANTOPRAZOLE SODIUM 40 MG: 40 TABLET, DELAYED RELEASE ORAL at 06:01

## 2017-01-01 RX ADMIN — DONEPEZIL HYDROCHLORIDE 10 MG: 5 TABLET, FILM COATED ORAL at 21:47

## 2017-01-01 RX ADMIN — IPRATROPIUM BROMIDE AND ALBUTEROL SULFATE 1 AMPULE: .5; 3 SOLUTION RESPIRATORY (INHALATION) at 20:41

## 2017-01-01 RX ADMIN — DONEPEZIL HYDROCHLORIDE 10 MG: 10 TABLET, FILM COATED ORAL at 21:37

## 2017-01-01 RX ADMIN — PIPERACILLIN SODIUM AND TAZOBACTAM SODIUM 3.38 G: 3; .375 INJECTION, POWDER, LYOPHILIZED, FOR SOLUTION INTRAVENOUS at 05:15

## 2017-01-01 RX ADMIN — ENOXAPARIN SODIUM 40 MG: 100 INJECTION SUBCUTANEOUS at 19:22

## 2017-01-01 RX ADMIN — DONEPEZIL HYDROCHLORIDE 10 MG: 5 TABLET, FILM COATED ORAL at 21:55

## 2017-01-01 RX ADMIN — ATENOLOL 25 MG: 25 TABLET ORAL at 10:29

## 2017-01-01 RX ADMIN — POTASSIUM CHLORIDE 20 MEQ: 750 CAPSULE, EXTENDED RELEASE ORAL at 08:17

## 2017-01-01 RX ADMIN — PANTOPRAZOLE SODIUM 40 MG: 40 TABLET, DELAYED RELEASE ORAL at 05:58

## 2017-01-01 RX ADMIN — SERTRALINE HYDROCHLORIDE 50 MG: 50 TABLET ORAL at 09:41

## 2017-01-01 RX ADMIN — PANTOPRAZOLE SODIUM 40 MG: 40 TABLET, DELAYED RELEASE ORAL at 05:57

## 2017-01-01 RX ADMIN — DOCUSATE SODIUM 100 MG: 100 CAPSULE, LIQUID FILLED ORAL at 10:40

## 2017-01-01 RX ADMIN — ASPIRIN 81 MG 81 MG: 81 TABLET ORAL at 09:38

## 2017-01-01 RX ADMIN — IPRATROPIUM BROMIDE AND ALBUTEROL SULFATE 3 ML: 2.5; .5 SOLUTION RESPIRATORY (INHALATION) at 22:21

## 2017-01-01 RX ADMIN — PANTOPRAZOLE SODIUM 40 MG: 40 TABLET, DELAYED RELEASE ORAL at 06:13

## 2017-01-01 RX ADMIN — IPRATROPIUM BROMIDE AND ALBUTEROL SULFATE 1 AMPULE: .5; 3 SOLUTION RESPIRATORY (INHALATION) at 12:27

## 2017-01-01 RX ADMIN — MEMANTINE 5 MG: 10 TABLET ORAL at 23:22

## 2017-01-01 RX ADMIN — CEFTRIAXONE 1 G: 1 INJECTION, POWDER, FOR SOLUTION INTRAMUSCULAR; INTRAVENOUS at 09:15

## 2017-01-01 RX ADMIN — Medication 3000 MG: at 21:49

## 2017-01-01 RX ADMIN — Medication 10 ML: at 10:28

## 2017-01-01 RX ADMIN — MEMANTINE HYDROCHLORIDE 5 MG: 5 TABLET ORAL at 11:03

## 2017-01-01 RX ADMIN — ENOXAPARIN SODIUM 40 MG: 100 INJECTION SUBCUTANEOUS at 21:48

## 2017-01-01 RX ADMIN — IPRATROPIUM BROMIDE AND ALBUTEROL SULFATE 1 AMPULE: 2.5; .5 SOLUTION RESPIRATORY (INHALATION) at 17:05

## 2017-01-01 RX ADMIN — PANTOPRAZOLE SODIUM 40 MG: 40 TABLET, DELAYED RELEASE ORAL at 05:51

## 2017-01-01 RX ADMIN — I-VITE, TAB 1000-60-2MG (60/BT) 1 TABLET: TAB at 19:24

## 2017-01-01 RX ADMIN — ENOXAPARIN SODIUM 40 MG: 40 INJECTION SUBCUTANEOUS at 17:44

## 2017-01-01 RX ADMIN — I-VITE, TAB 1000-60-2MG (60/BT) 1 TABLET: TAB at 10:29

## 2017-01-01 RX ADMIN — MEMANTINE 5 MG: 10 TABLET ORAL at 20:23

## 2017-01-01 RX ADMIN — ATENOLOL 25 MG: 25 TABLET ORAL at 08:23

## 2017-01-01 RX ADMIN — METHYLPREDNISOLONE SODIUM SUCCINATE 20 MG: 40 INJECTION, POWDER, FOR SOLUTION INTRAMUSCULAR; INTRAVENOUS at 09:17

## 2017-01-01 RX ADMIN — ALLOPURINOL 100 MG: 100 TABLET ORAL at 08:17

## 2017-01-01 RX ADMIN — AMOXICILLIN AND CLAVULANATE POTASSIUM 1 TABLET: 875; 125 TABLET, FILM COATED ORAL at 13:49

## 2017-01-01 RX ADMIN — I-VITE, TAB 1000-60-2MG (60/BT) 1 TABLET: TAB at 08:14

## 2017-01-01 RX ADMIN — IPRATROPIUM BROMIDE AND ALBUTEROL SULFATE 1 AMPULE: 2.5; .5 SOLUTION RESPIRATORY (INHALATION) at 05:15

## 2017-01-01 RX ADMIN — IPRATROPIUM BROMIDE AND ALBUTEROL SULFATE 1 AMPULE: .5; 3 SOLUTION RESPIRATORY (INHALATION) at 08:23

## 2017-01-01 RX ADMIN — Medication 2 PUFF: at 05:24

## 2017-01-01 RX ADMIN — ATENOLOL 25 MG: 25 TABLET ORAL at 08:14

## 2017-01-01 RX ADMIN — MEMANTINE 5 MG: 10 TABLET ORAL at 21:54

## 2017-01-01 RX ADMIN — ENOXAPARIN SODIUM 40 MG: 100 INJECTION SUBCUTANEOUS at 23:20

## 2017-01-01 RX ADMIN — PANTOPRAZOLE SODIUM 40 MG: 40 TABLET, DELAYED RELEASE ORAL at 06:50

## 2017-01-01 RX ADMIN — Medication 3000 MG: at 20:22

## 2017-01-01 RX ADMIN — ENOXAPARIN SODIUM 40 MG: 100 INJECTION SUBCUTANEOUS at 22:21

## 2017-01-01 RX ADMIN — DONEPEZIL HYDROCHLORIDE 10 MG: 5 TABLET, FILM COATED ORAL at 21:36

## 2017-01-01 RX ADMIN — MIRTAZAPINE 30 MG: 15 TABLET, FILM COATED ORAL at 20:17

## 2017-01-01 RX ADMIN — DOCUSATE SODIUM 100 MG: 100 CAPSULE, LIQUID FILLED ORAL at 21:37

## 2017-01-01 RX ADMIN — POTASSIUM CHLORIDE 20 MEQ: 750 CAPSULE, EXTENDED RELEASE ORAL at 11:01

## 2017-01-01 RX ADMIN — DOCUSATE SODIUM 100 MG: 100 CAPSULE, LIQUID FILLED ORAL at 08:14

## 2017-01-01 RX ADMIN — I-VITE, TAB 1000-60-2MG (60/BT) 1 TABLET: TAB at 23:22

## 2017-01-01 RX ADMIN — IPRATROPIUM BROMIDE AND ALBUTEROL SULFATE 1 AMPULE: 2.5; .5 SOLUTION RESPIRATORY (INHALATION) at 17:46

## 2017-01-01 RX ADMIN — IPRATROPIUM BROMIDE AND ALBUTEROL SULFATE 1 AMPULE: 2.5; .5 SOLUTION RESPIRATORY (INHALATION) at 21:00

## 2017-01-01 RX ADMIN — Medication 10 ML: at 21:00

## 2017-01-01 RX ADMIN — SIMVASTATIN 40 MG: 40 TABLET, FILM COATED ORAL at 19:22

## 2017-01-01 RX ADMIN — I-VITE, TAB 1000-60-2MG (60/BT) 1 TABLET: TAB at 09:18

## 2017-01-01 RX ADMIN — ASPIRIN 81 MG 81 MG: 81 TABLET ORAL at 08:14

## 2017-01-01 RX ADMIN — I-VITE, TAB 1000-60-2MG (60/BT) 1 TABLET: TAB at 08:23

## 2017-01-01 RX ADMIN — MIRTAZAPINE 30 MG: 15 TABLET, FILM COATED ORAL at 21:44

## 2017-01-01 RX ADMIN — DONEPEZIL HYDROCHLORIDE 10 MG: 5 TABLET, FILM COATED ORAL at 22:20

## 2017-01-01 RX ADMIN — ENOXAPARIN SODIUM 40 MG: 100 INJECTION SUBCUTANEOUS at 20:12

## 2017-01-01 RX ADMIN — MEMANTINE HYDROCHLORIDE 5 MG: 5 TABLET ORAL at 21:37

## 2017-01-01 RX ADMIN — CYANOCOBALAMIN TAB 500 MCG 1000 MCG: 500 TAB at 10:19

## 2017-01-01 RX ADMIN — ASPIRIN 81 MG 81 MG: 81 TABLET ORAL at 21:11

## 2017-01-01 RX ADMIN — Medication 3000 MG: at 20:12

## 2017-01-01 RX ADMIN — SIMVASTATIN 40 MG: 40 TABLET, FILM COATED ORAL at 19:24

## 2017-01-01 RX ADMIN — ALBUTEROL SULFATE 2.5 MG: 2.5 SOLUTION RESPIRATORY (INHALATION) at 08:51

## 2017-01-01 RX ADMIN — MEMANTINE 5 MG: 10 TABLET ORAL at 22:19

## 2017-01-01 RX ADMIN — POTASSIUM CHLORIDE 20 MEQ: 750 CAPSULE, EXTENDED RELEASE ORAL at 08:12

## 2017-01-01 RX ADMIN — AMOXICILLIN AND CLAVULANATE POTASSIUM 1 TABLET: 875; 125 TABLET, FILM COATED ORAL at 22:18

## 2017-01-01 RX ADMIN — ALLOPURINOL 100 MG: 100 TABLET ORAL at 11:03

## 2017-01-01 RX ADMIN — IPRATROPIUM BROMIDE AND ALBUTEROL SULFATE 3 ML: 2.5; .5 SOLUTION RESPIRATORY (INHALATION) at 05:33

## 2017-01-01 RX ADMIN — SERTRALINE 50 MG: 50 TABLET, FILM COATED ORAL at 10:40

## 2017-01-01 RX ADMIN — IPRATROPIUM BROMIDE AND ALBUTEROL SULFATE 1 AMPULE: 2.5; .5 SOLUTION RESPIRATORY (INHALATION) at 05:16

## 2017-01-01 RX ADMIN — SALINE NASAL SPRAY 1 SPRAY: 1.5 SOLUTION NASAL at 09:04

## 2017-01-01 RX ADMIN — ENOXAPARIN SODIUM 40 MG: 40 INJECTION SUBCUTANEOUS at 21:37

## 2017-01-01 RX ADMIN — DONEPEZIL HYDROCHLORIDE 10 MG: 5 TABLET, FILM COATED ORAL at 20:11

## 2017-01-01 RX ADMIN — QUETIAPINE FUMARATE 12.5 MG: 25 TABLET ORAL at 22:29

## 2017-01-01 RX ADMIN — ALLOPURINOL 100 MG: 100 TABLET ORAL at 10:29

## 2017-01-01 RX ADMIN — SIMVASTATIN 40 MG: 40 TABLET, FILM COATED ORAL at 21:12

## 2017-01-01 RX ADMIN — ASPIRIN 81 MG 81 MG: 81 TABLET ORAL at 09:18

## 2017-01-01 RX ADMIN — ALLOPURINOL 100 MG: 100 TABLET ORAL at 08:24

## 2017-01-01 RX ADMIN — SERTRALINE HYDROCHLORIDE 50 MG: 50 TABLET ORAL at 09:40

## 2017-01-01 RX ADMIN — SERTRALINE HYDROCHLORIDE 50 MG: 50 TABLET ORAL at 09:18

## 2017-01-01 RX ADMIN — MIRTAZAPINE 30 MG: 15 TABLET, FILM COATED ORAL at 20:12

## 2017-01-01 RX ADMIN — ATENOLOL 25 MG: 25 TABLET ORAL at 09:13

## 2017-01-01 RX ADMIN — ALLOPURINOL 100 MG: 100 TABLET ORAL at 09:18

## 2017-01-01 RX ADMIN — ASPIRIN 81 MG 81 MG: 81 TABLET ORAL at 09:04

## 2017-01-01 RX ADMIN — I-VITE, TAB 1000-60-2MG (60/BT) 1 TABLET: TAB at 10:40

## 2017-01-01 RX ADMIN — I-VITE, TAB 1000-60-2MG (60/BT) 1 TABLET: TAB at 20:17

## 2017-01-01 RX ADMIN — Medication 2 PUFF: at 08:51

## 2017-01-01 RX ADMIN — OXYMETAZOLINE HYDROCHLORIDE 2 SPRAY: 5 SPRAY NASAL at 21:59

## 2017-01-01 RX ADMIN — MEMANTINE 5 MG: 10 TABLET ORAL at 10:30

## 2017-01-01 RX ADMIN — IOPAMIDOL 100 ML: 755 INJECTION, SOLUTION INTRAVENOUS at 12:33

## 2017-01-01 RX ADMIN — SERTRALINE HYDROCHLORIDE 50 MG: 50 TABLET ORAL at 08:14

## 2017-01-01 RX ADMIN — Medication 2 PUFF: at 06:05

## 2017-01-01 RX ADMIN — MIRTAZAPINE 30 MG: 15 TABLET, FILM COATED ORAL at 21:12

## 2017-01-01 RX ADMIN — DOCUSATE SODIUM 100 MG: 100 CAPSULE, LIQUID FILLED ORAL at 08:24

## 2017-01-01 RX ADMIN — Medication 2 PUFF: at 05:30

## 2017-01-01 RX ADMIN — IPRATROPIUM BROMIDE AND ALBUTEROL SULFATE 1 AMPULE: .5; 3 SOLUTION RESPIRATORY (INHALATION) at 06:05

## 2017-01-01 RX ADMIN — I-VITE, TAB 1000-60-2MG (60/BT) 1 TABLET: TAB at 08:18

## 2017-01-01 RX ADMIN — AMOXICILLIN AND CLAVULANATE POTASSIUM 1 TABLET: 875; 125 TABLET, FILM COATED ORAL at 21:37

## 2017-01-01 RX ADMIN — MIRTAZAPINE 30 MG: 15 TABLET, FILM COATED ORAL at 21:55

## 2017-01-01 RX ADMIN — ALLOPURINOL 100 MG: 100 TABLET ORAL at 11:02

## 2017-01-01 RX ADMIN — MEMANTINE 5 MG: 10 TABLET ORAL at 08:18

## 2017-01-01 RX ADMIN — BARIUM SULFATE 450 ML: 21 SUSPENSION ORAL at 12:33

## 2017-01-01 RX ADMIN — AMOXICILLIN AND CLAVULANATE POTASSIUM 1 TABLET: 875; 125 TABLET, FILM COATED ORAL at 08:34

## 2017-01-01 RX ADMIN — ENOXAPARIN SODIUM 40 MG: 100 INJECTION SUBCUTANEOUS at 21:35

## 2017-01-01 RX ADMIN — DOCUSATE SODIUM 100 MG: 100 CAPSULE, LIQUID FILLED ORAL at 19:22

## 2017-01-01 RX ADMIN — I-VITE, TAB 1000-60-2MG (60/BT) 1 TABLET: TAB at 09:38

## 2017-01-01 RX ADMIN — Medication 2 PUFF: at 17:07

## 2017-01-01 RX ADMIN — IPRATROPIUM BROMIDE AND ALBUTEROL SULFATE 1 AMPULE: 2.5; .5 SOLUTION RESPIRATORY (INHALATION) at 16:02

## 2017-01-01 RX ADMIN — POTASSIUM CHLORIDE 20 MEQ: 750 CAPSULE, EXTENDED RELEASE ORAL at 08:33

## 2017-01-01 RX ADMIN — Medication 3000 MG: at 21:37

## 2017-01-01 RX ADMIN — Medication 2 PUFF: at 05:58

## 2017-01-01 RX ADMIN — DOCUSATE SODIUM 100 MG: 100 CAPSULE, LIQUID FILLED ORAL at 09:14

## 2017-01-01 RX ADMIN — MEMANTINE 5 MG: 10 TABLET ORAL at 20:12

## 2017-01-01 RX ADMIN — POTASSIUM CHLORIDE 20 MEQ: 750 CAPSULE, EXTENDED RELEASE ORAL at 22:28

## 2017-01-01 RX ADMIN — POTASSIUM CHLORIDE 20 MEQ: 750 CAPSULE, EXTENDED RELEASE ORAL at 21:55

## 2017-01-01 RX ADMIN — MEMANTINE HYDROCHLORIDE 5 MG: 5 TABLET, FILM COATED ORAL at 20:34

## 2017-01-01 RX ADMIN — DOCUSATE SODIUM 100 MG: 100 CAPSULE, LIQUID FILLED ORAL at 10:29

## 2017-01-01 RX ADMIN — DONEPEZIL HYDROCHLORIDE 10 MG: 10 TABLET, FILM COATED ORAL at 22:22

## 2017-01-01 RX ADMIN — GENTAMICIN SULFATE 1 DROP: 3 SOLUTION/ DROPS OPHTHALMIC at 08:20

## 2017-01-01 RX ADMIN — IPRATROPIUM BROMIDE AND ALBUTEROL SULFATE 1 AMPULE: .5; 3 SOLUTION RESPIRATORY (INHALATION) at 08:15

## 2017-01-01 RX ADMIN — I-VITE, TAB 1000-60-2MG (60/BT) 1 TABLET: TAB at 08:33

## 2017-01-01 RX ADMIN — DOCUSATE SODIUM 100 MG: 100 CAPSULE, LIQUID FILLED ORAL at 22:19

## 2017-01-01 RX ADMIN — PANTOPRAZOLE SODIUM 40 MG: 40 TABLET, DELAYED RELEASE ORAL at 06:03

## 2017-01-01 RX ADMIN — POTASSIUM CHLORIDE 20 MEQ: 20 TABLET, EXTENDED RELEASE ORAL at 20:56

## 2017-01-01 RX ADMIN — MEMANTINE HYDROCHLORIDE 5 MG: 5 TABLET, FILM COATED ORAL at 09:18

## 2017-01-01 RX ADMIN — IPRATROPIUM BROMIDE AND ALBUTEROL SULFATE 1 AMPULE: 2.5; .5 SOLUTION RESPIRATORY (INHALATION) at 20:23

## 2017-01-01 RX ADMIN — POTASSIUM CHLORIDE 20 MEQ: 750 CAPSULE, EXTENDED RELEASE ORAL at 20:23

## 2017-01-01 RX ADMIN — DOCUSATE SODIUM 100 MG: 100 CAPSULE, LIQUID FILLED ORAL at 21:59

## 2017-01-01 RX ADMIN — I-VITE, TAB 1000-60-2MG (60/BT) 1 TABLET: TAB at 21:44

## 2017-01-01 RX ADMIN — PANTOPRAZOLE SODIUM 40 MG: 40 TABLET, DELAYED RELEASE ORAL at 11:02

## 2017-01-01 RX ADMIN — ENOXAPARIN SODIUM 40 MG: 40 INJECTION SUBCUTANEOUS at 10:26

## 2017-01-01 RX ADMIN — ATENOLOL 25 MG: 25 TABLET ORAL at 10:30

## 2017-01-01 RX ADMIN — FAMOTIDINE 20 MG: 20 TABLET ORAL at 21:13

## 2017-01-01 RX ADMIN — IPRATROPIUM BROMIDE AND ALBUTEROL SULFATE 1 AMPULE: .5; 3 SOLUTION RESPIRATORY (INHALATION) at 20:30

## 2017-01-01 RX ADMIN — IPRATROPIUM BROMIDE AND ALBUTEROL SULFATE 1 AMPULE: .5; 3 SOLUTION RESPIRATORY (INHALATION) at 07:52

## 2017-01-01 RX ADMIN — AMOXICILLIN AND CLAVULANATE POTASSIUM 1 TABLET: 875; 125 TABLET, FILM COATED ORAL at 19:24

## 2017-01-01 RX ADMIN — Medication 2 PUFF: at 21:00

## 2017-01-01 RX ADMIN — SODIUM CHLORIDE, PRESERVATIVE FREE 10 ML: 5 INJECTION INTRAVENOUS at 22:26

## 2017-01-01 RX ADMIN — IPRATROPIUM BROMIDE AND ALBUTEROL SULFATE 1 AMPULE: 2.5; .5 SOLUTION RESPIRATORY (INHALATION) at 05:11

## 2017-01-01 RX ADMIN — METHYLPREDNISOLONE SODIUM SUCCINATE 20 MG: 40 INJECTION, POWDER, FOR SOLUTION INTRAMUSCULAR; INTRAVENOUS at 18:09

## 2017-01-01 RX ADMIN — LATANOPROST 1 DROP: 50 SOLUTION OPHTHALMIC at 22:26

## 2017-01-01 RX ADMIN — SERTRALINE HYDROCHLORIDE 50 MG: 50 TABLET ORAL at 08:23

## 2017-01-01 RX ADMIN — METHYLPREDNISOLONE SODIUM SUCCINATE 20 MG: 40 INJECTION, POWDER, FOR SOLUTION INTRAMUSCULAR; INTRAVENOUS at 02:04

## 2017-01-01 RX ADMIN — DOCUSATE SODIUM 100 MG: 100 CAPSULE, LIQUID FILLED ORAL at 21:55

## 2017-01-01 RX ADMIN — IPRATROPIUM BROMIDE AND ALBUTEROL SULFATE 1 AMPULE: 2.5; .5 SOLUTION RESPIRATORY (INHALATION) at 10:40

## 2017-01-01 RX ADMIN — MEMANTINE HYDROCHLORIDE 5 MG: 5 TABLET, FILM COATED ORAL at 10:19

## 2017-01-01 RX ADMIN — POTASSIUM CHLORIDE 20 MEQ: 750 CAPSULE, EXTENDED RELEASE ORAL at 21:12

## 2017-01-01 RX ADMIN — POTASSIUM CHLORIDE 20 MEQ: 750 CAPSULE, EXTENDED RELEASE ORAL at 10:27

## 2017-01-01 RX ADMIN — IPRATROPIUM BROMIDE AND ALBUTEROL SULFATE 3 ML: .5; 3 SOLUTION RESPIRATORY (INHALATION) at 11:55

## 2017-01-01 RX ADMIN — I-VITE, TAB 1000-60-2MG (60/BT) 1 TABLET: TAB at 09:40

## 2017-01-01 RX ADMIN — MEMANTINE HYDROCHLORIDE 5 MG: 5 TABLET, FILM COATED ORAL at 20:56

## 2017-01-01 RX ADMIN — OXYMETAZOLINE HYDROCHLORIDE 2 SPRAY: 5 SPRAY NASAL at 19:23

## 2017-01-01 RX ADMIN — SIMVASTATIN 40 MG: 40 TABLET, FILM COATED ORAL at 20:23

## 2017-01-01 RX ADMIN — BARIUM SULFATE 50 ML: 0.81 POWDER, FOR SUSPENSION ORAL at 15:02

## 2017-01-01 RX ADMIN — Medication 10 ML: at 02:05

## 2017-01-01 RX ADMIN — IPRATROPIUM BROMIDE AND ALBUTEROL SULFATE 1 AMPULE: .5; 3 SOLUTION RESPIRATORY (INHALATION) at 12:39

## 2017-01-01 RX ADMIN — ALLOPURINOL 100 MG: 100 TABLET ORAL at 08:13

## 2017-01-01 RX ADMIN — MIRTAZAPINE 30 MG: 15 TABLET, FILM COATED ORAL at 19:24

## 2017-01-01 RX ADMIN — I-VITE, TAB 1000-60-2MG (60/BT) 1 TABLET: TAB at 10:30

## 2017-01-01 RX ADMIN — IPRATROPIUM BROMIDE AND ALBUTEROL SULFATE 1 AMPULE: .5; 3 SOLUTION RESPIRATORY (INHALATION) at 20:24

## 2017-01-01 RX ADMIN — IPRATROPIUM BROMIDE AND ALBUTEROL SULFATE 1 AMPULE: 2.5; .5 SOLUTION RESPIRATORY (INHALATION) at 19:39

## 2017-01-01 RX ADMIN — DOCUSATE SODIUM 100 MG: 100 CAPSULE, LIQUID FILLED ORAL at 20:18

## 2017-01-01 RX ADMIN — Medication 3000 MG: at 21:36

## 2017-01-01 RX ADMIN — DOCUSATE SODIUM 100 MG: 100 CAPSULE, LIQUID FILLED ORAL at 20:22

## 2017-01-01 RX ADMIN — Medication 2 PUFF: at 07:31

## 2017-01-01 RX ADMIN — IPRATROPIUM BROMIDE AND ALBUTEROL SULFATE 1 AMPULE: 2.5; .5 SOLUTION RESPIRATORY (INHALATION) at 04:47

## 2017-01-01 RX ADMIN — ATENOLOL 25 MG: 25 TABLET ORAL at 10:19

## 2017-01-01 RX ADMIN — POTASSIUM CHLORIDE 20 MEQ: 750 CAPSULE, EXTENDED RELEASE ORAL at 21:44

## 2017-01-01 RX ADMIN — MEMANTINE 5 MG: 10 TABLET ORAL at 10:27

## 2017-01-01 RX ADMIN — SERTRALINE HYDROCHLORIDE 50 MG: 50 TABLET ORAL at 08:34

## 2017-01-01 RX ADMIN — TAZOBACTAM SODIUM AND PIPERACILLIN SODIUM 3.38 G: 375; 3 INJECTION, SOLUTION INTRAVENOUS at 23:12

## 2017-01-01 RX ADMIN — IPRATROPIUM BROMIDE AND ALBUTEROL SULFATE 3 ML: .5; 3 SOLUTION RESPIRATORY (INHALATION) at 15:42

## 2017-01-01 RX ADMIN — ATENOLOL 25 MG: 25 TABLET ORAL at 21:12

## 2017-01-01 RX ADMIN — Medication 2 PUFF: at 05:33

## 2017-01-01 RX ADMIN — MIRTAZAPINE 30 MG: 15 TABLET, FILM COATED ORAL at 22:06

## 2017-01-01 RX ADMIN — IPRATROPIUM BROMIDE AND ALBUTEROL SULFATE 1 AMPULE: 2.5; .5 SOLUTION RESPIRATORY (INHALATION) at 19:29

## 2017-01-01 RX ADMIN — OXYMETAZOLINE HYDROCHLORIDE 2 SPRAY: 5 SPRAY NASAL at 09:20

## 2017-01-01 RX ADMIN — Medication 3000 MG: at 20:18

## 2017-01-01 RX ADMIN — PANTOPRAZOLE SODIUM 40 MG: 40 TABLET, DELAYED RELEASE ORAL at 06:36

## 2017-01-01 RX ADMIN — VITAMIN D, TAB 1000IU (100/BT) 2000 UNITS: 25 TAB at 10:26

## 2017-01-01 RX ADMIN — MIRTAZAPINE 30 MG: 15 TABLET, FILM COATED ORAL at 20:22

## 2017-01-01 RX ADMIN — ALLOPURINOL 100 MG: 100 TABLET ORAL at 09:14

## 2017-01-01 RX ADMIN — IPRATROPIUM BROMIDE AND ALBUTEROL SULFATE 3 ML: 2.5; .5 SOLUTION RESPIRATORY (INHALATION) at 15:40

## 2017-01-01 RX ADMIN — SIMVASTATIN 40 MG: 40 TABLET, FILM COATED ORAL at 21:44

## 2017-01-01 RX ADMIN — CEFTRIAXONE 1 G: 1 INJECTION, POWDER, FOR SOLUTION INTRAMUSCULAR; INTRAVENOUS at 10:19

## 2017-01-01 RX ADMIN — MEMANTINE 5 MG: 10 TABLET ORAL at 19:21

## 2017-01-01 RX ADMIN — DONEPEZIL HYDROCHLORIDE 10 MG: 5 TABLET, FILM COATED ORAL at 19:24

## 2017-01-01 RX ADMIN — I-VITE, TAB 1000-60-2MG (60/BT) 1 TABLET: TAB at 19:21

## 2017-01-01 RX ADMIN — AMOXICILLIN AND CLAVULANATE POTASSIUM 1 TABLET: 875; 125 TABLET, FILM COATED ORAL at 21:45

## 2017-01-01 RX ADMIN — IPRATROPIUM BROMIDE AND ALBUTEROL SULFATE 1 AMPULE: .5; 3 SOLUTION RESPIRATORY (INHALATION) at 18:05

## 2017-01-01 RX ADMIN — IPRATROPIUM BROMIDE AND ALBUTEROL SULFATE 1 AMPULE: 2.5; .5 SOLUTION RESPIRATORY (INHALATION) at 14:58

## 2017-01-01 RX ADMIN — POTASSIUM CHLORIDE 20 MEQ: 20 TABLET, EXTENDED RELEASE ORAL at 09:18

## 2017-01-01 RX ADMIN — DOCUSATE SODIUM 100 MG: 100 CAPSULE, LIQUID FILLED ORAL at 21:44

## 2017-01-01 RX ADMIN — Medication 2 PUFF: at 20:40

## 2017-01-01 RX ADMIN — POTASSIUM CHLORIDE 20 MEQ: 750 CAPSULE, EXTENDED RELEASE ORAL at 10:29

## 2017-01-01 RX ADMIN — IPRATROPIUM BROMIDE AND ALBUTEROL SULFATE 3 ML: .5; 3 SOLUTION RESPIRATORY (INHALATION) at 15:33

## 2017-01-01 RX ADMIN — ATENOLOL 25 MG: 25 TABLET ORAL at 10:27

## 2017-01-01 RX ADMIN — LATANOPROST 1 DROP: 50 SOLUTION OPHTHALMIC at 20:23

## 2017-01-01 RX ADMIN — IPRATROPIUM BROMIDE AND ALBUTEROL SULFATE 3 ML: .5; 3 SOLUTION RESPIRATORY (INHALATION) at 07:32

## 2017-01-01 RX ADMIN — OXYMETAZOLINE HYDROCHLORIDE 2 SPRAY: 5 SPRAY NASAL at 08:37

## 2017-01-01 RX ADMIN — LATANOPROST 1 DROP: 50 SOLUTION OPHTHALMIC at 21:42

## 2017-01-01 ASSESSMENT — PAIN SCALES - GENERAL
PAINLEVEL_OUTOF10: 0

## 2017-01-01 ASSESSMENT — ENCOUNTER SYMPTOMS
CONSTIPATION: 0
SHORTNESS OF BREATH: 1
SHORTNESS OF BREATH: 0
BACK PAIN: 0
TROUBLE SWALLOWING: 0
CONSTIPATION: 0
EYES NEGATIVE: 1
GASTROINTESTINAL NEGATIVE: 1
NAUSEA: 0
COUGH: 1
SHORTNESS OF BREATH: 0
SHORTNESS OF BREATH: 0
ANAL BLEEDING: 0
SHORTNESS OF BREATH: 1
SHORTNESS OF BREATH: 1
CHOKING: 0
ABDOMINAL PAIN: 0
CHEST TIGHTNESS: 1
NAUSEA: 0
SHORTNESS OF BREATH: 1
SORE THROAT: 0
BACK PAIN: 0
COUGH: 1
COUGH: 0
SPUTUM PRODUCTION: 1
DIARRHEA: 0
CHEST TIGHTNESS: 0
COUGH: 1
SHORTNESS OF BREATH: 1
CHEST TIGHTNESS: 0
DIARRHEA: 0
COUGH: 0
NAUSEA: 0
DIARRHEA: 0
EYE PAIN: 0
DIARRHEA: 0
BLOOD IN STOOL: 0
SHORTNESS OF BREATH: 1
EYE REDNESS: 0
NAUSEA: 0
HEMOPTYSIS: 0
ABDOMINAL PAIN: 0
COUGH: 0
COUGH: 1
VOMITING: 0
WHEEZING: 0
DIARRHEA: 1
DIARRHEA: 0
WHEEZING: 1
CONSTIPATION: 0
COUGH: 1
ABDOMINAL PAIN: 1
WHEEZING: 1
SHORTNESS OF BREATH: 0
ABDOMINAL DISTENTION: 0
VOMITING: 0
PHOTOPHOBIA: 0
SHORTNESS OF BREATH: 0
EYE PAIN: 0
FACIAL SWELLING: 0
WHEEZING: 1
CHEST TIGHTNESS: 0
SHORTNESS OF BREATH: 1
COUGH: 1
ABDOMINAL PAIN: 0
SHORTNESS OF BREATH: 0
COLOR CHANGE: 0
ABDOMINAL PAIN: 0
VOMITING: 0
WHEEZING: 0
COUGH: 1
SHORTNESS OF BREATH: 0
VOMITING: 0

## 2017-09-20 PROBLEM — G93.41 METABOLIC ENCEPHALOPATHY: Status: ACTIVE | Noted: 2017-01-01

## 2017-10-06 NOTE — PROGRESS NOTES
[facility]  [address]  P: [phone]   F: [phone]    Subjective  iTa Bagley., 80 y.o. male presents today with:  Chief Complaint   Patient presents with    Check-Up     HPI  Patient is seen today for follow-up of COPD exacerbation. He is completed course of Ceftin, is using supplemental oxygen, and reports that his breathing has been good. He has an occasional nonproductive cough, no exertional dyspnea, no wheezing. He is walking long distances using a Rollator. He has mild cognitive impairment, and is on Namenda. He lives at home with his wife and granddaughter, and discharge is planned for next Wednesday. We'll repeat baseline labs are to discharge. Review of Systems   Constitutional: Negative for appetite change (appetite is never great), chills, fatigue and fever. Respiratory: Positive for cough and shortness of breath. Negative for chest tightness and wheezing. Cardiovascular: Negative for chest pain, palpitations and leg swelling. Gastrointestinal: Negative for abdominal pain and constipation. Musculoskeletal: Positive for gait problem (using Rollator). Negative for arthralgias and back pain. Psychiatric/Behavioral: Positive for confusion. The patient is not nervous/anxious. Allergies   Allergen Reactions    Adhesive Tape Rash     blistering    Brimonidine     Niacin And Related     Promethazine Hcl Other (See Comments)    Codeine Anxiety and Other (See Comments)     nightmares    Phenergan [Promethazine] Anxiety     Nightmares and hallucinations    Prednisone Anxiety      Medications reviewed at facility. Objective  Vitals:    10/06/17 0843   BP: 136/74   Pulse: 76   Resp: 18   Temp: 98.2 °F (36.8 °C)   SpO2: 97%   Weight: 195 lb (88.5 kg)   Height: 6' (1.829 m)     Physical Exam   Constitutional: He is well-developed, well-nourished, and in no distress.    Cardiovascular: Normal rate, regular rhythm and normal heart sounds. No murmur heard. No lower extremity edema   Pulmonary/Chest: Effort normal. He has wheezes (scattered wheezes, upper lobes). He has no rales. Abdominal: Soft. Bowel sounds are normal. There is no tenderness. Musculoskeletal: Normal range of motion. He exhibits no edema or tenderness. Psychiatric:   Able to state current [place] 36. with cueing, able to state current location, gives year as \"2020 \"   Nursing note and vitals reviewed. Assessment & Plan   1. COPD with acute exacerbation ([facility] [address]      Improving; completed ATB, on oxygen, continue maintenance meds. 2. Unsteady gait      Monitoring; using rollator. Continue PT/OT   3. Alzheimer's dementia without behavioral disturbance, unspecified timing of dementia onset      Mild, on Namenda. Medications reviewed and updated  CBC, BMP on Monday  Encourage fluid intake, exercise as tolerated, and good nutrition. Continue therapies, and continue to stress fall prevention strategies. Return for follow up as needed while in skilled rehab.     Jaz Cali, CNP

## 2017-10-10 NOTE — PROGRESS NOTES
SOLN nebulizer solution Inhale 1 vial into the lungs 4 times daily      latanoprost (XALATAN) 0.005 % ophthalmic solution Place 1 drop into both eyes nightly      memantine (NAMENDA) 5 MG tablet Take 5 mg by mouth 2 times daily      mirtazapine (REMERON) 15 MG tablet Take 30 mg by mouth nightly      nitroGLYCERIN (NITROSTAT) 0.4 MG SL tablet Place 0.4 mg under the tongue every 5 minutes as needed for Chest pain up to max of 3 total doses. If no relief after 1 dose, call 911.  Multiple Vitamins-Minerals (OCUVITE-LUTEIN) TABS oral tablet Take 1 tablet by mouth 2 times daily      omeprazole (PRILOSEC) 20 MG delayed release capsule Take 20 mg by mouth daily      potassium chloride (MICRO-K) 10 MEQ extended release capsule Take 20 mEq by mouth 2 times daily      sertraline (ZOLOFT) 50 MG tablet Take 50 mg by mouth daily      simvastatin (ZOCOR) 40 MG tablet Take 40 mg by mouth nightly      cefUROXime (CEFTIN) 500 MG tablet Take 500 mg by mouth 2 times daily       No facility-administered encounter medications on file as of 10/9/2017. Objective  Vitals:    10/09/17 0908   BP: 136/74   Pulse: 70   Resp: 16   Temp: 98.2 °F (36.8 °C)   SpO2: 97%   Weight: 195 lb (88.5 kg)   Height: 6' (1.829 m)     Lab Results   Component Value Date    WBC 6.3 10/09/2017    HGB 12.8 (L) 10/09/2017    HCT 39.6 (L) 10/09/2017    MCV 92.6 10/09/2017     10/09/2017     Lab Results   Component Value Date     10/09/2017    K 4.5 10/09/2017     10/09/2017    CO2 24 10/09/2017    BUN 12 10/09/2017    CREATININE 1.10 10/09/2017    GLUCOSE 82 10/09/2017    CALCIUM 9.3 10/09/2017      Physical Exam   Constitutional: He is well-developed, well-nourished, and in no distress. Cardiovascular: Normal rate, regular rhythm and normal heart sounds. No murmur heard. No lower extremity edema   Pulmonary/Chest: Effort normal. He has no wheezes. He has no rales. Abdominal: Soft.  Bowel sounds are normal. There is no

## 2017-10-17 NOTE — ED NOTES
Telemetry monitor applied. Patient and wife aware of admit to room 288.      Adrian Smith RN  10/17/17 7692

## 2017-10-17 NOTE — PROGRESS NOTES
[facility] Respiratory Therapy Evaluation   Current Order: duoneb qid albuterol q2 prn  Home Regimen: yes    Ordering Physician: margarette  Re-evaluation Date:  10/20    [address] [facility]   Patient Status: Stable / Unstable + Physician notified    The following MDI Criteria must be met in order to convert aerosol to MDI with spacer. If unable to meet, MDI will be converted to aerosol:  []  Patient able to demonstrate the ability to use MDI effectively  []  Patient alert and cooperative  []  Patient able to take deep breath with 5-10 second hold  []  Medication(s) available in this delivery method   []  Peak flow greater than or equal to 200 ml/min            Current Order Substituted To  (same drug, same frequency)   Aerosol to MDI [] Albuterol Sulfate 0.083% unit dose by aerosol Albuterol Sulfate MDI 2 puffs by inhalation with spacer    [] Levalbuterol 1.25 mg unit dose by aerosol Levalbuterol MDI 2 puffs by inhalation with spacer    [] Levalbuterol 0.63 mg unit dose by aerosol Levalbuterol MDI 2 puffs by inhalation with spacer    [] Ipratropium Bromide 0.02% unit dose by aerosol Ipratropium Bromide MDI 2 puffs by inhalation with spacer    [] Duoneb (Ipratropium + Albuterol) unit dose by aerosol Ipratropium MDI + Albuterol MDI 2 puffs by inhalation w/spacer   MDI to Aerosol [] Albuterol Sulfate MDI Albuterol Sulfate 0.083% unit dose by aerosol    [] Levalbuterol MDI 2 puffs by inhalation Levalbuterol 1.25 mg unit dose by aerosol    [] Ipratropium Bromide MDI by inhalation Ipratropium Bromide 0.02% unit dose by aerosol    [] Combivent (Ipratropium + Albuterol) MDI by inhalation Duoneb (Ipratropium + Albuterol) unit dose by aerosol   Treatment Assessment [Frequency/Schedule]:  Change frequency to: ____________no change_____________________________________per Protocol, P&T, MEC      Points 0 1 2 3 4   Pulmonary Status  Non-Smoker  []   Smoking history   < 20 pack years  []   Smoking history  ?  20 pack years  [] Pulmonary Disorder  (acute or chronic)  [x]   Severe or Chronic w/ Exacerbation  []     Surgical Status No [x]   Surgeries     General []   Surgery Lower []   Abdominal Thoracic or []   Upper Abdominal Thoracic with  PulmonaryDisorder  []     Chest X-ray Clear/Not  Ordered     []  Chronic Changes  Results Pending  [x]  Infiltrates, atelectasis, pleural effusion, or edema  []  Infiltrates in more than one lobe []  Infiltrate + Atelectasis, &/or pleural effusion  []    Respiratory Pattern Regular,  RR = 12-20 [x]  Increased,  RR = 21-25 []  LAURA, irregular,  or RR = 26-30 []  Decreased FEV1  or RR = 31-35 []  Severe SOB, use  of accessory muscles, or RR ? 35  []    Mental Status Alert, oriented,  Cooperative [x]  Confused but Follows commands []  Lethargic or unable to follow commands []  Obtunded  []  Comatose  []    Breath Sounds Clear to  auscultation  []  Decreased unilaterally or  in bases only [x]  Decreased  bilaterally  []  Crackles or intermittent wheezes []  Wheezes []    Cough Strong, Spontan., & nonproductive [x]  Strong,  spontaneous, &  productive []  Weak,  Nonproductive []  Weak, productive or  with wheezes []  No spontaneous  cough or may require suctioning []    Level of Activity Ambulatory []  Ambulatory w/ Assist  [x]  Non-ambulatory []  Paraplegic []  Quadriplegic []    Total    Score:__6_____     Triage Score:__3______      Tri       Triage:     1. (>20) Freq: Q3    2. (16-20) Freq: Q4   3. (11-15) Freq: QID & Albuterol Q2 PRN    4. (6-10) Freq: TID & Albuterol Q2 PRN    5. (0-5) Freq Q4prn

## 2017-10-17 NOTE — FLOWSHEET NOTE
Pt arrived to floor via cart ambulated to bed with assist of 2 gait unsteady. Assisted into bed. Was just released from [facility] on Thursday. He was found on the floor at 800 am pt doesn't  remember anything about the fall. Pt eats a reg diet with set up of staff. Wife was here at bedside but is going home. Pt heart rate goes from 73 to 38 then back up to 120 then back to 45. RN aware. Assisted pt to bedside commode and instructed family on C-diff precautions due to loose stools that have mucus and blood ting in them stool set to lab in ER. Bed alarm in place and falls protocol in place. .Electronically signed by Martha Louise LPN on 75/56/1708 at 4:36 PM

## 2017-10-17 NOTE — ED NOTES
Up to [facility]. Ambulated 3-4 steps back to bed with O2 on @ 2 LPM NC, pulse ox 86-87%. Dr. Katie Lang aware.      Teja Rodríguez RN  10/17/17 4988

## 2017-10-17 NOTE — ED PROVIDER NOTES
[address] [facility] ED  eMERGENCY dEPARTMENT eNCOUnter      Pt Name: Ji Stallings MRN: [id number]  Birthdate 1934  Date of evaluation: 10/17/2017  Provider: Yolette Frankel DO    CHIEF COMPLAINT       Chief Complaint   Patient presents with    Fatigue     since this am, approx 0900. pt family states that pt is typically weak in the am, however worse today, per EMS pt was at baseline by the time they arrived. HISTORY OF PRESENT ILLNESS   (Location/Symptom, Timing/Onset, Context/Setting, Quality, Duration, Modifying Factors, Severity)  Note limiting factors. Ji Stallings is a 80 y.o. male who presents to the emergency department With complaint of extreme weakness. Patient had gotten up a couple times at night and fallen hitting his head according to the family. They heard him fall but they did not witness this. Also they found large amounts of copious diarrhea-like stool in the bathroom. Patient just gotten out of rehab. Patient is oxygen dependent and has 2 different sets instructions one of which says to use 2-1/2 L of nasal cannula with ambulation and at night. After the patient gotten out of rehab he was told to use 2-1/2 L nasal cannula continuously. The patient's wife and daughter contributed to the patient's history. Patient is a poor historian secondary to Alzheimer's dementia. Patient is pleasant and answers some basic questions but cannot state times that he had gotten up and also cannot provide medical history. The history is provided by the spouse and a relative. Nursing Notes were reviewed. REVIEW OF SYSTEMS    (2-9 systems for level 4, 10 or more for level 5)     Review of Systems   Unable to perform ROS: Dementia   Constitutional: Positive for activity change, appetite change and fatigue. Respiratory: Positive for cough. Gastrointestinal: Positive for abdominal pain and diarrhea. Skin: Negative for rash.    Neurological: Positive for weakness ( all over). Except as noted above the remainder of the review of systems was reviewed and negative.        PAST MEDICAL HISTORY     Past Medical History:   Diagnosis Date    Acid reflux 7/29/2014    Acquired cyst of kidney 7/6/2007    Alzheimer disease     Basal cell carcinoma of lip 7/1/2005    Overview:  left upper lip nostril Overview:  left upper lip nostril    Basal cell papilloma 10/20/2009    CAD (coronary artery disease)     Cancer (HCC)     HX skin    COPD (chronic obstructive pulmonary disease) (HCC)     DD (diverticular disease) 7/29/2014    Dementia     Depression     Diverticulosis of intestine 7/29/2014    Dyslipidemia     Gastroesophageal reflux disease 7/29/2014    Glaucoma 7/29/2014    H/O malignant neoplasm of skin 10/20/2009    H/O surgical procedure 7/29/2014    Overview:  RUQ removed 4/15/10 lung ca Overview:  RUQ removed 4/15/10 lung ca    Hematuria, microscopic 7/13/2009    History of surgical procedure 7/29/2014    Overview:  RUQ removed 4/15/10 lung ca     History of tobacco use 7/13/2009    HTN (hypertension)     Impaired mobility and activities of daily living     Kidney stone     Macular degeneration     MI (myocardial infarction)     NSVT (nonsustained ventricular tachycardia) ([facility] [address] 3/29/2012    Obstructive apnea 7/29/2014    Overview:  Non compliant w/ CPAP, uses 2.5 L NC with sleep     Orthostasis     Pneumonia     Syncope 6/28/2012         SURGICAL HISTORY       Past Surgical History:   Procedure Laterality Date    CHOLECYSTECTOMY, LAPAROSCOPIC  3/13/03    COLONOSCOPY  10/6/06    CORONARY ANGIOPLASTY WITH STENT PLACEMENT  3/2012    CYSTOSCOPY  2005    KIDNEY STONE SURGERY  several    LUNG BIOPSY Right 4/13/10    OTHER SURGICAL HISTORY      cardiac stents    SKIN CANCER EXCISION  9/12/06    UPPER GASTROINTESTINAL ENDOSCOPY           CURRENT MEDICATIONS       Previous Medications    ACETAMINOPHEN (TYLENOL) 325 MG TABLET    Take 650 mg by mouth every 4 hours as needed for Pain    ALLOPURINOL (ZYLOPRIM) 100 MG TABLET    Take 100 mg by mouth daily    ASPIRIN 81 MG TABLET    Take 81 mg by mouth daily    ATENOLOL (TENORMIN) 25 MG TABLET    Take 25 mg by mouth daily    CEFUROXIME (CEFTIN) 500 MG TABLET    Take 500 mg by mouth 2 times daily    CHOLECALCIFEROL 2000 UNITS TABS    Take by mouth every morning    COENZYME Q-10 PO    Take 100 mg by mouth 2 times daily     CYANOCOBALAMIN ER 1000 MCG TBCR    Take by mouth every morning    DONEPEZIL (ARICEPT) 10 MG TABLET    Take 10 mg by mouth nightly    HYDROCORTISONE 2.5 % CREAM    Apply topically 2 times daily Apply topically 2 times daily. IODOQUINOL-HC (DERMAZENE) 1 % CREA    Apply topically 2 times daily    IPRATROPIUM-ALBUTEROL (DUONEB) 0.5-2.5 (3) MG/3ML SOLN NEBULIZER SOLUTION    Inhale 1 vial into the lungs 4 times daily    LATANOPROST (XALATAN) 0.005 % OPHTHALMIC SOLUTION    Place 1 drop into both eyes nightly    MEMANTINE (NAMENDA) 5 MG TABLET    Take 5 mg by mouth 2 times daily    MIRTAZAPINE (REMERON) 15 MG TABLET    Take 30 mg by mouth nightly    MOMETASONE-FORMOTEROL (DULERA) 200-5 MCG/ACT INHALER    Inhale 2 puffs into the lungs every morning    MULTIPLE VITAMINS-MINERALS (OCUVITE-LUTEIN) TABS ORAL TABLET    Take 1 tablet by mouth 2 times daily    NITROGLYCERIN (NITROSTAT) 0.4 MG SL TABLET    Place 0.4 mg under the tongue every 5 minutes as needed for Chest pain up to max of 3 total doses. If no relief after 1 dose, call 911. OMEGA-3 FATTY ACIDS (FISH OIL) 600 MG CAPS    Take 3,000 mg by mouth nightly    OMEPRAZOLE (PRILOSEC) 20 MG DELAYED RELEASE CAPSULE    Take 20 mg by mouth daily    POTASSIUM CHLORIDE (MICRO-K) 10 MEQ EXTENDED RELEASE CAPSULE    Take 20 mEq by mouth 2 times daily    SERTRALINE (ZOLOFT) 50 MG TABLET    Take 50 mg by mouth daily    SIMVASTATIN (ZOCOR) 40 MG TABLET    Take 40 mg by mouth nightly       ALLERGIES     Adhesive tape; Brimonidine;  Niacin and related; well-nourished. No distress. HENT:   Head: Normocephalic and atraumatic. Head is without Tabor's sign. Right Ear: External ear normal.   Left Ear: External ear normal.   Nose: Nose normal.   Mouth/Throat: Oropharynx is clear and moist. Mucous membranes are dry. No oropharyngeal exudate. Eyes: Conjunctivae and EOM are normal. Pupils are equal, round, and reactive to light. No foreign body present in the right eye. Left eye exhibits no exudate. No scleral icterus. Neck: Normal range of motion. Neck supple. No JVD present. No neck rigidity. No tracheal deviation present. Cardiovascular: Normal rate, regular rhythm, normal heart sounds and intact distal pulses. Exam reveals no gallop, no distant heart sounds and no friction rub. No murmur heard. Pulmonary/Chest: Effort normal and breath sounds normal. No stridor. No respiratory distress. He has no wheezes. Abdominal: Soft. Normal appearance and normal aorta. He exhibits distension. He exhibits no shifting dullness, no pulsatile liver, no fluid wave, no abdominal bruit, no ascites, no pulsatile midline mass and no mass. Bowel sounds are decreased. There is no hepatosplenomegaly. There is generalized tenderness. There is no rigidity, no rebound, no guarding, no CVA tenderness, no tenderness at McBurney's point and negative Diamond's sign. Musculoskeletal: Normal range of motion. He exhibits no edema or tenderness. Lymphadenopathy:        Head (right side): No submental adenopathy present. Head (left side): No submental adenopathy present. Neurological: He is alert. He has normal reflexes. No cranial nerve deficit. Coordination normal.   AOx2   Skin: Skin is warm and dry. No rash noted. He is not diaphoretic. No erythema. Psychiatric: He has a normal mood and affect. His behavior is normal. Judgment and thought content normal.   Nursing note and vitals reviewed.       DIAGNOSTIC RESULTS     EKG: All EKG's are interpreted by the Emergency Department Physician who either signs or Co-signs this chart in the absence of a cardiologist.    EKG: Normal sinus rhythm with a first-degree AV block at 90 bpm, there are PVCs present throughout the EKG, the axis is normal, there is T-wave flattening in aVL, the QT interval is 326 ms. RADIOLOGY:   Non-plain film images such as CT, Ultrasound and MRI are read by the radiologist. Plain radiographic images are visualized and preliminarily interpreted by the emergency physician with the below findings:        Interpretation per the Radiologist below, if available at the time of this note:    XR CHEST STANDARD (2 VW)   Final Result   INCREASED COARSENING INTERSTITIAL MARKINGS, SUSPECT MILD VASCULAR CONGESTION. CT ABDOMEN PELVIS W IV CONTRAST Additional Contrast? Oral   Preliminary Result   ACUTE RIGHT LOWER LOBE INFILTRATE. INCIDENTAL STABLE CHANGES THROUGHOUT THE ABDOMEN AND PELVIS, AS DISCUSSED ABOVE. NO NEW FINDINGS. CT CERVICAL SPINE WO CONTRAST   Final Result   NO ACUTE CERVICAL SPINE INJURIES. CT HEAD WO CONTRAST   Final Result   NO ACUTE INTRACRANIAL PATHOLOGY.                        ED BEDSIDE ULTRASOUND:   Performed by ED Physician - none    LABS:  Labs Reviewed   HIGH SENSITIVITY CRP - Abnormal; Notable for the following:        Result Value    CRP High Sensitivity 206.0 (*)     All other components within normal limits   COMPREHENSIVE METABOLIC PANEL - Abnormal; Notable for the following:     Chloride 97 (*)     Anion Gap 14 (*)     Glucose 143 (*)     Alb 3.3 (*)     All other components within normal limits   CBC WITH AUTO DIFFERENTIAL - Abnormal; Notable for the following:     WBC 12.4 (*)     RBC 4.28 (*)     Hemoglobin 12.8 (*)     Hematocrit 39.1 (*)     MCHC 32.7 (*)     Neutrophils # 9.9 (*)     Lymphocytes # 0.9 (*)     Monocytes # 1.4 (*)     All other components within normal limits   LIPID PANEL - Abnormal; Notable for the following:     HDL 36 (*)     All other

## 2017-10-17 NOTE — PROGRESS NOTES
Pharmacy Consult Note    Pharmacy consult placed by Dr. Breezy López with the following request:   Order: Pharmacy to Dose: Other - See Comments: Medication review and           adjustment     Current Home Med List (per Epic list)    ACETAMINOPHEN (TYLENOL) 325 MG TABLET    Take 650 mg by mouth every 4 hours as needed for Pain     ALLOPURINOL (ZYLOPRIM) 100 MG TABLET    Take 100 mg by mouth daily  Pharmacy comment: OK per renal function      ASPIRIN 81 MG TABLET    Take 81 mg by mouth daily     ATENOLOL (TENORMIN) 25 MG TABLET    Take 25 mg by mouth daily     CEFUROXIME (CEFTIN) 500 MG TABLET    Take 500 mg by mouth 2 times daily    Pharmacy comment: Pharmacy recommends [address] [facility] this order as patient is admitted for pneumonia and will receive IV antibiotics      CHOLECALCIFEROL 2000 UNITS TABS    Take by mouth every morning     COENZYME Q-10 PO    Take 100 mg by mouth 2 times daily      CYANOCOBALAMIN ER 1000 MCG TBCR    Take by mouth every morning  Pharmacy comment: This is a non-formulary item, can accept formulary substitution or patient can bring in their own supply      DONEPEZIL (ARICEPT) 10 MG TABLET    Take 10 mg by mouth nightly  Pharmacy comment: Patient also takes Namenda, which may flag as a therapeutic duplication, but both agents are acceptable therapy      HYDROCORTISONE 2.5 % CREAM    Apply topically 2 times daily Apply topically 2 times daily. IODOQUINOL-HC (DERMAZENE) 1 % CREA    Apply topically 2 times daily  Pharmacy comment:  This is a non-formulary item, with no P&T auto-substitution; patient can bring in their own supply     IPRATROPIUM-ALBUTEROL (DUONEB) 0.5-2.5 (3) MG/3ML SOLN NEBULIZER SOLUTION    Inhale 1 vial into the lungs 4 times daily     LATANOPROST (XALATAN) 0.005 % OPHTHALMIC SOLUTION    Place 1 drop into both eyes nightly     MEMANTINE (NAMENDA) 5 MG TABLET    Take 5 mg by mouth 2 times daily  Pharmacy comment: Patient also takes Aricept, which may flag as a therapeutic duplication, but both agents are acceptable therapy      MIRTAZAPINE (REMERON) 15 MG TABLET    Take 30 mg by mouth nightly     MOMETASONE-FORMOTEROL (DULERA) 200-5 MCG/ACT INHALER    Inhale 2 puffs into the lungs every morning     MULTIPLE VITAMINS-MINERALS (OCUVITE-LUTEIN) TABS ORAL TABLET    Take 1 tablet by mouth 2 times daily     NITROGLYCERIN (NITROSTAT) 0.4 MG SL TABLET    Place 0.4 mg under the tongue every 5 minutes as needed for Chest pain up to max of 3 total doses. If no relief after 1 dose, call 911. OMEGA-3 FATTY ACIDS (FISH OIL) 600 MG CAPS    Take 3,000 mg by mouth nightly     OMEPRAZOLE (PRILOSEC) 20 MG DELAYED RELEASE CAPSULE    Take 20 mg by mouth daily  Pharmacy comment: This is a non-formulary item, can accept formulary substitution to Protonix if medication reordered on admission      POTASSIUM CHLORIDE (MICRO-K) 10 MEQ EXTENDED RELEASE CAPSULE    Take 20 mEq by mouth 2 times daily  Pharmacy comment: Potassium level within normal limits, consider if patient truly needs supplementation at this time (not on diuresis, etc). Recent Labs      10/17/17   1030   K  4.3          SERTRALINE (ZOLOFT) 50 MG TABLET    Take 50 mg by mouth daily  Pharmacy comment: QTc interval within normal limits   Recent Labs      10/17/17   1019   QTC  398          SIMVASTATIN (ZOCOR) 40 MG TABLET    Take 40 mg by mouth nightly  Pharmacy comment: Lipid panel within normal limits, however HDL slightly low. Medication profiled reviewed prior to admission (while patient was still in ED) at the request of Dr. Aníbal Castellanos. See bolded comments typed into medication list, as these are comments from pharmacy.     No major drug-drug interactions listed    Renal function appropriate, no medications need to be renally adjusted at this time    QTc within normal limits   Patient does have clinical signs of infection, would recommend changing to IV antimicrobial therapy upon admission (and DC ceftin)    If further therapies or pharmacy needs arise, please reorder consult or call [phone] with additional questions     Monster Oconnor PharmD   10/17/2017 3:14 PM

## 2017-10-17 NOTE — ED TRIAGE NOTES
Per EMS pt family states that pt was weaker that usual this am and they had difficulty getting him ut of bed. Family and pt told EMS that he has been having episodes of diarrhea, number of episodes unknown. EMS states that pt was a &o x3, and ambulating at baseline with use of cane upon their arrival. Pt normally uses cane and wears 2.5 l/ nc at home. Upon arrival pt is a &o x3, resp even and unlabored with expiratory wheeze noted t/o, moist non-productive cough also noted. Pt is pale, pink, warm, dry, MSP's intact, and no edema noted. Per pt family this episodes of weakness started at 0600, and that pt did fall this am, pt also has a history of alzheimer's.

## 2017-10-18 NOTE — CARE COORDINATION
SPOKE WITH PATIENT WIFE JAH REGARDING D/C PLANS/NEEDS(PATIENT HAS DEMENTIA). SHE STATES [facility]. JAH WOULD LIKE TO SEE IF PATIENT QUALIFIES FOR [facility]. HE RECEIVED ONE VISIT FROM [facility] AND THEN WAS ADMITTED AFTER A FALL. SHE SAID PATIENT HAS A RAISED TOILET SET, SHOWER CHAIR, HOME O2, WALKER, ROLLATOR, AND TRANSPORT CHAIR WHICH WAS PROVIDED THROUGH THE [place]. SHE DOES NOT WANT PATIENT GOING BACK TO Critical access hospital AND STATES IF HE DOESN'T QUALIFY FOR [facility], SHE WOULD [facility] CARE TEAM TO FOLLOW.  Electronically signed by Cecil Davis RN on 10/18/2017 at 12:09 PM

## 2017-10-18 NOTE — PROGRESS NOTES
with no cues for safety awareness   [x]  Other :  Improve sitting/standing balance to complete ADL activities as projected. Patient Goal: To return home  Discussed and agreed upon: [x] Yes   [] No         Comments:     Assessment/Discharge Disposition:     Performance deficits / Impairments: Decreased functional mobility , Decreased ADL status, Decreased safe awareness, Decreased strength, Decreased endurance, Decreased balance, Decreased coordination  Prognosis: Good  Discharge Recommendations: Continue to assess pending progress  History: Multiple comorbidities  Exam: 7 performance deficits  Assistance / Modification: Mod A    Prognosis:  [x] Good   []Fair   [] Poor     Barriers to Improvement:  Endurance    Recommended DME:  [] W/W   [] [place]   [] Rollator   [] W/C   [] Manley Scriver  [] Shower Chair   []Dressing AD []  BSC  [] Other:    Plan:Times per week: 1-3x/week    G-Codes:  OT G-codes  Functional Assessment Tool Used: Clinical Judgement  Functional Limitation: Self care  Self Care Current Status (): At least 40 percent but less than 60 percent impaired, limited or restricted  Self Care Goal Status ():  At least 1 percent but less than 20 percent impaired, limited or restricted    Time in:  1:35  Time out:  1:55  Timed treatment minutes:  20 minutes  Total treatment time/minutes:  20 minutes    Electronically signed by:    APRIL Degroot  10/18/2017, 1:58 PM

## 2017-10-18 NOTE — PROGRESS NOTES
AM assessment complete. Patient denies pain. Patient alert to time and self, disoriented to situation and location at this time. Pt knows that he is in the hospital but is unable to state what city he is in. Non productive cough. Expiratory wheezing. Will continue to monitor. 1105-Spoke with wife outside of room who stated that she was interested in patient going to rehab. Spoke with Banneril Aid, care coordinator and asked her to go and speak w the family.       1120-Elizabeth took over patients care   Electronically signed by Veda Madison RN on 10/18/2017 at 2:09 PM

## 2017-10-18 NOTE — PROGRESS NOTES
Physical Therapy Med Surg Initial Assessment  [facility]Department[facility]  [facility]       NAME: Silviano Manriquez. : 1934 (80 y.o.)  MRN: [id number]  CODE STATUS: Full Code    Date of Service: 10/18/2017    Patient Diagnosis(es): Pneumonia [J18.9]   Chief Complaint   Patient presents with    Fatigue     since this am, approx 0900. pt family states that pt is typically weak in the am, however worse today, per EMS pt was at baseline by the time they arrived.       Patient Active Problem List    Diagnosis Date Noted    Metabolic encephalopathy     Dementia     Alzheimer disease     Macular degeneration     Pneumonia     Acid reflux 2014    Glaucoma 2014    Chronic obstructive pulmonary disease with acute exacerbation ([facility] [address] 2014    Obstructive sleep apnea syndrome 2014    CAD (coronary artery disease) s/p ALIYAH to mid Circ. 3/2012 2012    HTN (hypertension) 2012    Depression 2012    Dyslipidemia 2012        Past Medical History:   Diagnosis Date    Acid reflux 2014    Acquired cyst of kidney 2007    Alzheimer disease     Arthritis     Basal cell carcinoma of lip 2005    Overview:  left upper lip nostril Overview:  left upper lip nostril    Basal cell papilloma 10/20/2009    CAD (coronary artery disease)     Cancer (HCC)     HX skin    COPD (chronic obstructive pulmonary disease) ([facility] [address]     DD (diverticular disease) 2014    Dementia     Depression     Diverticulosis of intestine 2014    Dyslipidemia     Gastroesophageal reflux disease 2014    Glaucoma 2014    H/O malignant neoplasm of skin 10/20/2009    H/O surgical procedure 2014    Overview:  RUQ removed 4/15/10 lung ca Overview:  RUQ removed 4/15/10 lung ca    Hematuria, microscopic 2009    History of surgical procedure 2014    Overview:  RUQ removed 4/15/10 lung ca     History of tobacco use 7/13/2009    HTN (hypertension)     Impaired mobility and activities of daily living     Kidney stone     Macular degeneration     MI (myocardial infarction)     Neuromuscular disorder ([facility] [address]     NSVT (nonsustained ventricular tachycardia) ([facility] [address] 3/29/2012    Obstructive apnea 7/29/2014    Overview:  Non compliant w/ CPAP, uses 2.5 L NC with sleep     Orthostasis     Pneumonia     Syncope 6/28/2012     Past Surgical History:   Procedure Laterality Date    APPENDECTOMY      CHOLECYSTECTOMY, LAPAROSCOPIC  3/13/03    COLONOSCOPY  10/6/06    CORONARY ANGIOPLASTY WITH STENT PLACEMENT  3/2012    CYSTOSCOPY  2005    KIDNEY STONE SURGERY  several    LUNG BIOPSY Right 4/13/10    OTHER SURGICAL HISTORY      cardiac stents    SKIN BIOPSY      SKIN CANCER EXCISION  9/12/06    UPPER GASTROINTESTINAL ENDOSCOPY      VASCULAR SURGERY              Restrictions:  Restrictions/Precautions:  (enteric precautions)  Body mass index is 25.77 kg/m².      SUBJECTIVE: Subjective: Pt sleeping upon PT arrival, pleasant and agreeable to get OOB this am.   Pre Treatment Pain Screening  Pain at present: 0    Post Treatment Pain Screening:   Pain Screening  Patient Currently in Pain: No  Pain Assessment  Pain Level: 0    Prior Level of Function:  Social/Functional History  Lives With:  (wife and daughter)  Type of Home: House  Home Layout: Two level (reports he has a stair lift)  Home Equipment: Rolling walker  ADL Assistance: Independent  Homemaking Assistance: Needs assistance  Ambulation Assistance: Independent  Transfer Assistance: Independent  Additional Comments: Pt providing minimal details re: home setup, PLOF    OBJECTIVE:   Vision/Hearing:  Vision: Impaired  Vision Exceptions: Wears glasses at all times  Hearing: Within functional limits    Cognition:  Overall Orientation Status: Within Functional Limits    Observation/Palpation  Observation: 2L O2 via NC    ROM:  RLE AROM: WFL  LLE AROM : WFL    Strength:functionally observed to be impaired in trunk and B LEs, able to lift LEs against gravity and maintain supported sitting indep         Neuro:  Balance  Sitting - Static: Fair;+  Sitting - Dynamic: Fair;+  Standing - Static: Fair;-  Standing - Dynamic: Fair;-  Comments: unsteady in standing, impaired safety awareness             Bed mobility  Supine to Sit: Stand by assistance (increased time and difficulty, cueing for technique, use of bedrails)    Transfers  Sit to Stand: Contact guard assistance (cueing for safe hand placement, bed height elevated for assist 2/2 increased difficulty intiiating STS from EOB)  Stand to sit: Contact guard assistance (decreased safety, Pt reacvhing for chair armrest prior to fully turning to sit or backing up to chair, uncontrolled descent with CGA to guide safely to chair)  Comment: 2WW for UE support    Ambulation  Ambulation?: Yes  Ambulation 1  Device: Rolling Walker  Assistance: Minimal assistance  Quality of Gait: increased R lateral lean, unsteady in standing with decreased safety awareness. Very small, shuffling steps   Distance: 5'               ASSESSMENT:   Body structures, Functions, Activity limitations: Decreased functional mobility ; Decreased strength;Decreased safe awareness;Decreased balance;Decreased high-level IADLs  Decision Making: Medium Complexity  History: high  Exam: mod  Clinical Presentation: unstable d/t dementia    Prognosis: Guarded  Patient Education: role of PT, safety with transfers and gait, DC recs  Barriers to Learning: safety concerns, dementia (although followed 1 step commands consistently)    DISCHARGE RECOMMENDATIONS:  Discharge Recommendations: Patient would benefit from continued therapy after discharge    Assessment: Pt reports being at functional baseline. Unable to obtain detailed information re: home set up and amount of assist pt requires for ADLs and IADLs.  Pt would benefit from supervision at home 2/2 impaired safety awareness and balance

## 2017-10-18 NOTE — H&P
Pee Paulson is an 80 y.o.  male. Past medical history of dementia, COPD, hypertension, and coronary artery disease. Patient was recently discharged from rehab after previous hospitalization secondary to COPD exacerbation. When patient returned home, patient was weaker than usual, and had a fall with a closed injury to his head. Brought to ER and found to have right lower lobe pneumonia. Also there was reports of loose watery stools. He is admitted to the medical floor, Zosyn started this time, stools were sent for C. diff, and patient is going to need physical therapy once again probable placement.     Past Medical History:   Diagnosis Date    Acid reflux 7/29/2014    Acquired cyst of kidney 7/6/2007    Alzheimer disease     Arthritis     Basal cell carcinoma of lip 7/1/2005    Overview:  left upper lip nostril Overview:  left upper lip nostril    Basal cell papilloma 10/20/2009    CAD (coronary artery disease)     Cancer (HCC)     HX skin    COPD (chronic obstructive pulmonary disease) ([address]     DD (diverticular disease) 7/29/2014    Dementia     Depression     Diverticulosis of intestine 7/29/2014    Dyslipidemia     Gastroesophageal reflux disease 7/29/2014    Glaucoma 7/29/2014    H/O malignant neoplasm of skin 10/20/2009    H/O surgical procedure 7/29/2014    Overview:  RUQ removed 4/15/10 lung ca Overview:  RUQ removed 4/15/10 lung ca    Hematuria, microscopic 7/13/2009    History of surgical procedure 7/29/2014    Overview:  RUQ removed 4/15/10 lung ca     History of tobacco use 7/13/2009    HTN (hypertension)     Impaired mobility and activities of daily living     Kidney stone     Macular degeneration     MI (myocardial infarction)     Neuromuscular disorder ([address]     NSVT (nonsustained ventricular tachycardia) ([address] 3/29/2012    Obstructive apnea 7/29/2014    Overview:  Non compliant w/ CPAP, uses 2.5 L NC with sleep     Orthostasis     Pneumonia     Macular degeneration    Acid reflux    Glaucoma    Chronic obstructive pulmonary disease with acute exacerbation (HCC)    Obstructive sleep apnea syndrome    Metabolic encephalopathy    Blood pressure (!) 146/74, pulse 80, temperature 99.5 °F (37.5 °C), temperature source Oral, resp. rate 18, height 6' (1.829 m), weight 190 lb 0.6 oz (86.2 kg), SpO2 95 %. Review of Systems   Unable to perform ROS: Dementia       Physical Exam   Constitutional: He is cooperative. No distress. HENT:   Head: Normocephalic and atraumatic. Eyes: Conjunctivae and EOM are normal.   Neck: Normal range of motion. Neck supple. Cardiovascular: Normal rate, regular rhythm and normal heart sounds. Pulmonary/Chest: No respiratory distress. He has decreased breath sounds in the right lower field. Abdominal: Soft. Bowel sounds are normal. He exhibits no distension. There is no tenderness. Musculoskeletal: He exhibits no edema. Lymphadenopathy:     He has no cervical adenopathy. Neurological: He is alert. Pleasantly confused   Skin: Skin is warm and dry.      Results for orders placed or performed during the hospital encounter of 10/17/17   Urine Culture   Result Value Ref Range    Urine Culture, Routine No growth in <24 hours, culture reincubated    High sensitivity CRP   Result Value Ref Range    CRP High Sensitivity 206.0 (H) 0.0 - 5.0 mg/L   Comprehensive Metabolic Panel   Result Value Ref Range    Sodium 139 132 - 144 mEq/L    Potassium 4.3 3.5 - 5.1 mEq/L    Chloride 97 (L) 98 - 107 mEq/L    CO2 28 22 - 29 mEq/L    Anion Gap 14 (H) 7 - 13 mEq/L    Glucose 143 (H) 74 - 109 mg/dL    BUN 13 8 - 23 mg/dL    CREATININE 0.96 0.70 - 1.20 mg/dL    GFR Non-African American >60.0 >60    GFR  >60.0 >60    Calcium 9.5 8.6 - 10.2 mg/dL    Total Protein 6.5 6.4 - 8.1 g/dL    Alb 3.3 (L) 3.9 - 4.9 g/dL    Total Bilirubin 0.8 0.0 - 1.2 mg/dL    Alkaline Phosphatase 80 35 - 104 U/L    ALT 10 0 - 41 U/L    AST 13 0 - 40 U/L Globulin 3.2 2.3 - 3.5 g/dL   CK   Result Value Ref Range    Total CK 33 0 - 190 U/L   CBC Auto Differential   Result Value Ref Range    WBC 12.4 (H) 4.8 - 10.8 K/uL    RBC 4.28 (L) 4.70 - 6.10 M/uL    Hemoglobin 12.8 (L) 14.0 - 18.0 g/dL    Hematocrit 39.1 (L) 42.0 - 52.0 %    MCV 91.2 80.0 - 100.0 fL    MCH 29.9 27.0 - 31.3 pg    MCHC 32.7 (L) 33.0 - 37.0 %    RDW 14.3 11.5 - 14.5 %    Platelets 569 819 - 025 K/uL    Neutrophils % 79.9 %    Lymphocytes % 7.7 %    Monocytes % 11.6 %    Eosinophils % 0.3 %    Basophils % 0.5 %    Neutrophils # 9.9 (H) 1.4 - 6.5 K/uL    Lymphocytes # 0.9 (L) 1.0 - 4.8 K/uL    Monocytes # 1.4 (H) 0.2 - 0.8 K/uL    Eosinophils # 0.0 0.0 - 0.7 K/uL    Basophils # 0.1 0.0 - 0.2 K/uL   Brain Natriuretic Peptide   Result Value Ref Range    Pro- pg/mL   APTT   Result Value Ref Range    aPTT 28.5 21.6 - 35.4 sec   Lactic Acid, Plasma   Result Value Ref Range    Lactic Acid 1.3 0.5 - 2.2 mmol/L   Lipid Panel   Result Value Ref Range    Cholesterol, Total 134 0 - 199 mg/dL    Triglycerides 101 0 - 200 mg/dL    HDL 36 (L) 40 - 59 mg/dL    LDL Calculated 78 0 - 129 mg/dL   Magnesium   Result Value Ref Range    Magnesium 1.9 1.7 - 2.3 mg/dL   Protime-INR   Result Value Ref Range    Protime 11.2 8.1 - 13.7 sec    INR 1.1    Troponin   Result Value Ref Range    Troponin <0.010 0.000 - 0.010 ng/mL   Urinalysis Reflex to Culture   Result Value Ref Range    Color, UA Yellow Straw/Yellow    Clarity, UA Clear Clear    Glucose, Ur Negative Negative mg/dL    Bilirubin Urine Negative Negative    Ketones, Urine Negative Negative mg/dL    Specific Gravity, UA 1.027 1.005 - 1.030    Blood, Urine MODERATE (A) Negative    pH, UA 6.0 5.0 - 9.0    Protein, UA 30 (A) Negative mg/dL    Urobilinogen, Urine 1.0 <2.0 E.U./dL    Nitrite, Urine Negative Negative    Leukocyte Esterase, Urine Negative Negative    Urine Reflex to Culture YES    Microscopic Urinalysis   Result Value Ref Range    Mucus, UA Present WBC, UA 0-2 0 - 5 /HPF    RBC, UA 20-50 (A) 0 - 2 /HPF    Epi Cells 0-2 /HPF    Crystals 3+ Uric Acid    Comprehensive metabolic panel   Result Value Ref Range    Sodium 141 132 - 144 mEq/L    Potassium 3.9 3.5 - 5.1 mEq/L    Chloride 101 98 - 107 mEq/L    CO2 29 22 - 29 mEq/L    Anion Gap 11 7 - 13 mEq/L    Glucose 119 (H) 74 - 109 mg/dL    BUN 14 8 - 23 mg/dL    CREATININE 0.89 0.70 - 1.20 mg/dL    GFR Non-African American >60.0 >60    GFR  >60.0 >60    Calcium 9.2 8.6 - 10.2 mg/dL    Total Protein 6.2 (L) 6.4 - 8.1 g/dL    Alb 3.2 (L) 3.9 - 4.9 g/dL    Total Bilirubin 0.9 0.0 - 1.2 mg/dL    Alkaline Phosphatase 80 35 - 104 U/L    ALT 11 0 - 41 U/L    AST 16 0 - 40 U/L    Globulin 3.0 2.3 - 3.5 g/dL   CBC   Result Value Ref Range    WBC 10.7 4.8 - 10.8 K/uL    RBC 4.10 (L) 4.70 - 6.10 M/uL    Hemoglobin 12.4 (L) 14.0 - 18.0 g/dL    Hematocrit 37.7 (L) 42.0 - 52.0 %    MCV 91.8 80.0 - 100.0 fL    MCH 30.2 27.0 - 31.3 pg    MCHC 32.9 (L) 33.0 - 37.0 %    RDW 14.8 (H) 11.5 - 14.5 %    Platelets 966 906 - 234 K/uL   POCT Creatinine   Result Value Ref Range    POC CREATININE WHOLE BLOOD 1.1    POCT Venous   Result Value Ref Range    POC Creatinine 1.1 0.8 - 1.3 mg/dL    GFR Non-African American >60 >60    GFR  >60 >60    Sample Type DARION     Performed on SEE BELOW    EKG 12 Lead   Result Value Ref Range    Ventricular Rate 90 BPM    Atrial Rate 90 BPM    P-R Interval 222 ms    QRS Duration 82 ms    Q-T Interval 326 ms    QTc Calculation (Bazett) 398 ms    P Axis 59 degrees    R Axis 13 degrees    T Axis 68 degrees         Assessment:  Right lower lobe pneumonia  Diarrhea  Fall with closed head injury  Dementia  COPD  Hypertension  CAD    Plan:  Admit to medical floor  On IV Zosyn  Cultures sent for C.  Diff  Lovenox for DVT prophylaxis  PT/OT  Resume home medications when appropriate      Mikayla Villavicencio PA-C  10/18/2017

## 2017-10-19 NOTE — PROGRESS NOTES
Physical Therapy  Facility/Department: [facility] [id number]  Daily Progress Note    NAME: Alonzo Da Silva. : 1934 (80 y.o.)  MRN: [id number]    Account: [de-identified]  Gender: male    Date of Service: 10/19/17    Patient Diagnosis(es):   Patient Active Problem List    Diagnosis Date Noted    Metabolic encephalopathy     Dementia     Alzheimer disease     Macular degeneration     Pneumonia     Acid reflux 2014    Glaucoma 2014    Chronic obstructive pulmonary disease with acute exacerbation ([facility] [address] 2014    Obstructive sleep apnea syndrome 2014    CAD (coronary artery disease) s/p ALIYAH to mid Circ. 3/2012 2012    HTN (hypertension) 2012    Depression 2012    Dyslipidemia 2012       Precautions/Weight Bearing Restrictions: Weight bearing as tolerated  Restrictions/Precautions:  (enteric precautions)  Falls Safety Armband Present:  [x] Yes  [] No    SUBJECTIVE:  Pain:   Initial Pain level: none    Reassessment of Pain: none     OBJECTIVE:  Pts daughter present and observed treatment. Bed Mobility:   Rolling: - Stand by Assist  Supine to Sit: -  Stand by Assist   Sit to Supine: -  Stand by Assist    Increased time and effort with bed flat and without rail. Transfers:   Sit to Stand: - Minimal Assist   Stand to Sit: - Minimal Assist  VCs for safety and technique to increase ease. Pt uses heavy posterior LEs to come to standing. Assist for steadying. Gait/Ambulation:   Assistive Device: Rolling Walker, O2  Assist Level: Minimal Assist  Distance: 15x2  Surface: linoleum/hardwood  Gait Deviations: Flexed trunk, decreased step length. VCs to improve    Neuromuscular Re-Education/Balance:  Static standing 10 seconds without UE support, with UE support 45 seconds with cues for postural correction.       Plan   Plan  Times per week: 3-6  Current Treatment Recommendations: Strengthening, Balance Training, Functional

## 2017-10-19 NOTE — HOME CARE
Patient was admitted to [facility] but only had one visit. Will need new start of care if discharge plan is to home. Sticky note on for physician and notified Care Coordinator.

## 2017-10-19 NOTE — PROGRESS NOTES
Nutrition Assessment    Type and Reason for Visit: Initial, Positive Nutrition Screen    Nutrition Recommendations: Modify current diet, Start ONS (Liberalize diet to General with decreased intake/recent weight loss. To add high calorie oral supplement tid and frozen oral supplement bid)    Malnutrition Assessment:  · Malnutrition Status: At risk for malnutrition  · Context: Chronic illness    Nutrition Diagnosis:   · Problem: Inadequate oral intake  · Etiology: related to  (Current condition)     Signs and symptoms:  as evidenced by Weight loss greater than or equal to 5% in 1 month, Diet history of poor intake    Nutrition Assessment:  · Subjective Assessment: Per pt and wife, pt with decreased appetite/intake with weight loss over last ~1month with being ill. Pt agreeable to oral  supplement. Wife states that intake was good prio to then. · Wound Type: None  · Current Nutrition Therapies:  · Oral Diet Orders: Cardiac   · Oral Diet intake: 51-75%  · Oral Nutrition Supplement (ONS) Orders: None  · Anthropometric Measures:  · Ht: 6' (182.9 cm)   · Admission Body Wt: 190 lb (86.2 kg)  · Usual Body Wt: 200 lb (90.7 kg) (stated per wife, ~1month ago)  · % Weight Change: 5%,  ~1 month  · Ideal Body Wt: 178 lb (80.7 kg), % Ideal Body 107%  · BMI Classification: BMI 25.0 - 29.9 Overweight  · Comparative Standards (Estimated Nutrition Needs):  · Estimated Daily Total Kcal:  5116-4276  · Estimated Daily Protein (g):  81-97    Estimated Intake vs Estimated Needs: Intake Improving    Nutrition Risk Level: High    Nutrition Interventions:   Modify current diet, Start ONS (Liberalize diet to General with decreased intake/recent weight loss. To add high calorie oral supplement tid and frozen oral supplement bid)  Continued Inpatient Monitoring    Nutrition Evaluation:   · Evaluation: Goals set   · Goals: Intake >75% of meals/supplement. Stable weight.     · Monitoring: Meal Intake, Supplement Intake, Weight, Pertinent

## 2017-10-19 NOTE — PROGRESS NOTES
Sharlene Hooper. is a 80 y.o. male patient. Admitted with right lower lobar pneumonia, falls, closed head injury.     Current Facility-Administered Medications   Medication Dose Route Frequency Provider Last Rate Last Dose    allopurinol (ZYLOPRIM) tablet 100 mg  100 mg Oral Daily Mike Gonzales MD   100 mg at 10/19/17 0914    aspirin chewable tablet 81 mg  81 mg Oral Daily Mike Gonzales MD   81 mg at 10/19/17 0914    atenolol (TENORMIN) tablet 25 mg  25 mg Oral Daily Mike Gonzales MD   25 mg at 10/19/17 0913    donepezil (ARICEPT) tablet 10 mg  10 mg Oral Nightly Mike Gonzales MD   10 mg at 10/18/17 2137    mometasone-formoterol (DULERA) 200-5 MCG/ACT inhaler 2 puff  2 puff Inhalation QAM Mike Gonzales MD   2 puff at 10/19/17 0731    fish oil capsule 3,000 mg  3,000 mg Oral Nightly Mike Gonzales MD   3,000 mg at 10/18/17 2136    ipratropium-albuterol (DUONEB) nebulizer solution 3 mL  1 vial Inhalation 4x Daily Mike Gonzales MD   3 mL at 10/19/17 0732    latanoprost (XALATAN) 0.005 % ophthalmic solution 1 drop  1 drop Both Eyes Nightly Mike Gonzales MD   1 drop at 10/18/17 2247    memantine (NAMENDA) tablet 5 mg  5 mg Oral BID Mike Gonzales MD   5 mg at 10/19/17 0914    mirtazapine (REMERON) tablet 30 mg  30 mg Oral Nightly Mike Gonzales MD   30 mg at 10/18/17 2137    nitroGLYCERIN (NITROSTAT) SL tablet 0.4 mg  0.4 mg Sublingual Q5 Min PRN Mike Gonzales MD        antioxidant multivitamin (OCUVITE) tablet  1 tablet Oral BID Mike Gonzales MD   1 tablet at 10/19/17 0914    potassium chloride (MICRO-K) extended release capsule 20 mEq  20 mEq Oral BID Mike Gonzales MD   20 mEq at 10/19/17 0913    sertraline (ZOLOFT) tablet 50 mg  50 mg Oral Daily Mike Gonzales MD   50 mg at 10/18/17 1040    simvastatin (ZOCOR) tablet 40 mg  40 mg Oral Nightly Mike Gonzales MD   40 mg at 10/18/17 2137    pantoprazole (PROTONIX) tablet 40 mg  40 mg Oral KVNG KRAUSE MD Cecil   40 mg at 10/19/17 0557    sodium chloride flush 0.9 % injection 10 mL  10 mL Intravenous 2 times per day Ivana Madden MD   10 mL at 10/18/17 2248    sodium chloride flush 0.9 % injection 10 mL  10 mL Intravenous PRN Ivana Madden MD   10 mL at 10/18/17 1441    acetaminophen (TYLENOL) tablet 650 mg  650 mg Oral Q4H PRN Ivana Madden MD        sodium chloride flush 0.9 % injection 10 mL  10 mL Intravenous PRN Ivana Madden MD        magnesium hydroxide (MILK OF MAGNESIA) 400 MG/5ML suspension 30 mL  30 mL Oral Daily PRN Ivana Madden MD        docusate sodium (COLACE) capsule 100 mg  100 mg Oral BID Ivana Madden MD   100 mg at 10/19/17 0914    ondansetron (ZOFRAN) injection 4 mg  4 mg Intravenous Q6H PRN Ivana Madden MD        enoxaparin (LOVENOX) injection 40 mg  40 mg Subcutaneous Daily Ivana Madden MD   40 mg at 10/18/17 2137    piperacillin-tazobactam (ZOSYN) 3.375 g in dextrose 5 % 50 mL IVPB extended infusion (mini-bag)  3.375 g Intravenous Martinez Figueredo MD 12.5 mL/hr at 10/19/17 0557 3.375 g at 10/19/17 0557    albuterol (PROVENTIL) nebulizer solution 2.5 mg  2.5 mg Nebulization Q2H PRN Ivana Madden MD         Allergies   Allergen Reactions    Adhesive Tape Rash     blistering    Brimonidine     Niacin And Related     Promethazine Hcl Other (See Comments)    Codeine Anxiety and Other (See Comments)     nightmares    Phenergan [Promethazine] Anxiety     Nightmares and hallucinations    Prednisone Anxiety     Principal Problem:    Pneumonia  Active Problems:    CAD (coronary artery disease) s/p ALIYAH to mid Circ. 3/2012    HTN (hypertension)    Depression    Dyslipidemia    Dementia    Alzheimer disease    Macular degeneration    Acid reflux    Glaucoma    Chronic obstructive pulmonary disease with acute exacerbation (HCC)    Obstructive sleep apnea syndrome    Metabolic encephalopathy    Blood pressure 139/67, pulse 61, temperature 98.2 °F (36.8 °C), temperature source Oral, resp. rate 16, height 6' (1.829 m), weight 190 lb 0.6 oz (86.2 kg), SpO2 95 %. Subjective:  Symptoms:  Stable. He reports malaise and weakness. No shortness of breath, cough, chest pain or chest pressure. Activity level: Impaired due to weakness. Pain:  He reports no pain. Objective:  General Appearance: In no acute distress. Vital signs: (most recent): Blood pressure 139/67, pulse 61, temperature 98.2 °F (36.8 °C), temperature source Oral, resp. rate 16, height 6' (1.829 m), weight 190 lb 0.6 oz (86.2 kg), SpO2 95 %. Vital signs are normal.  No fever. Output: Producing urine. HEENT: Normal HEENT exam.    Lungs:  Normal effort and normal respiratory rate. He is not in respiratory distress. There are decreased breath sounds. Heart: Normal rate. Regular rhythm. Abdomen: Abdomen is soft. Bowel sounds are normal.   There is no abdominal tenderness. Neurological: Patient is alert. Pupils:  Pupils are equal, round, and reactive to light. Skin:  Warm and dry. Assessment:    Condition: In stable condition. (Right lower lobe pneumonia  Falls  Dementia  COPD  Hypertesnion). Plan:   (Patient is stable, discharge planning to skilled nursing facility, precertified started, change to by mouth medications upon discharge).        Akiko Lujan PA-C  10/19/2017

## 2017-10-20 PROBLEM — R26.9 GAIT ABNORMALITY: Status: ACTIVE | Noted: 2017-01-01

## 2017-10-20 NOTE — PROGRESS NOTES
chronic)  [x]   Severe or Chronic w/ Exacerbation  []     Surgical Status No [x]   Surgeries     General []   Surgery Lower []   Abdominal Thoracic or []   Upper Abdominal Thoracic with  PulmonaryDisorder  []     Chest X-ray Clear/Not  Ordered     []  Chronic Changes  Results Pending  []  Infiltrates, atelectasis, pleural effusion, or edema  [x]  Infiltrates in more than one lobe []  Infiltrate + Atelectasis, &/or pleural effusion  []    Respiratory Pattern Regular,  RR = 12-20 [x]  Increased,  RR = 21-25 []  LAURA, irregular,  or RR = 26-30 []  Decreased FEV1  or RR = 31-35 []  Severe SOB, use  of accessory muscles, or RR ? 35  []    Mental Status Alert, oriented,  Cooperative [x]  Confused but Follows commands []  Lethargic or unable to follow commands []  Obtunded  []  Comatose  []    Breath Sounds Clear to  auscultation  []  Decreased unilaterally or  in bases only []  Decreased  bilaterally  [x]  Crackles or intermittent wheezes []  Wheezes []    Cough Strong, Spontan., & nonproductive [x]  Strong,  spontaneous, &  productive []  Weak,  Nonproductive []  Weak, productive or  with wheezes []  No spontaneous  cough or may require suctioning []    Level of Activity Ambulatory []  Ambulatory w/ Assist  [x]  Non-ambulatory []  Paraplegic []  Quadriplegic []    Total    Score:____8___     Triage Score:___4_____      Tri       Triage:     1. (>20) Freq: Q3    2. (16-20) Freq: Q4   3. (11-15) Freq: QID & Albuterol Q2 PRN    4. (6-10) Freq: TID & Albuterol Q2 PRN    5. (0-5) Freq Q4prn

## 2017-10-20 NOTE — PROGRESS NOTES
CLINICAL PHARMACY: DISCHARGE MED RECONCILIATION/REVIEW    [facility] ([facility]) Select Patient?: No  Total # of Interventions Recommended: 0   -   Total # Interventions Accepted: 0  Time Spent (min): 15    Additional Documentation:    Pharmacy Patient Education - Medication Counseling    Admitting Diagnosis: Pneumonia  Counseled patient on new medications started since admission utilizing teach-back method. Reviewed indication, directions, possible major drug-drug and drug-food interactions, most common side effects, and length of therapy. Please check boxes as appropriate:           x   Reviewed patients home medication list            -   Printed patient medication education handouts from [facility] or [facility]          -   Patient voiced their ability to  their medications from a pharmacy          x   Patient voiced understanding and any concerns          x   Patient understands the importance of medication adherence            x   Patient instructed to call the pharmacy message line at [phone] ([facility]) if    any additional pharmacy questions arise (response time is within 24    hours)      Additional Notes/Follow Up:   Discussed patient's home medication list and those that will be resumed upon discharge with patient and daughter at bedside. Patient did not require education handouts at this time and as he will be discharged to rehab we did not discuss picking up medications from a pharmacy. Differences from patient's home medication list were noted, patient's therapy with cefuroxime has been completed and daughter believes patient uses Dulera inhaler BID rather than QAM. Patient and daughter did not have any concerns or questions at this time. Thank you,  Jatinder Cr.  Randee Cade, PharmD  PGY-1 Pharmacy Resident  10/20/2017 1:56 PM

## 2017-10-20 NOTE — PROGRESS NOTES
Physical Therapy Med Surg Daily Treatment Note  [facility]Department[facility]  [id number]       NAME: Mary Mondragon. : 1934 (80 y.o.)  MRN: [id number]  CODE STATUS: Full Code    Date of Service: 10/20/2017    Patient Diagnosis(es): Pneumonia [J18.9]   Chief Complaint   Patient presents with    Fatigue     since this am, approx 0900. pt family states that pt is typically weak in the am, however worse today, per EMS pt was at baseline by the time they arrived.       Patient Active Problem List    Diagnosis Date Noted    Gait abnormality     Metabolic encephalopathy     Dementia     Alzheimer disease     Macular degeneration     Pneumonia     Acid reflux 2014    Glaucoma 2014    Chronic obstructive pulmonary disease with acute exacerbation ([facility] [address] 2014    Obstructive sleep apnea syndrome 2014    CAD (coronary artery disease) s/p ALIYAH to mid Circ. 3/2012 2012    HTN (hypertension) 2012    Depression 2012    Dyslipidemia 2012        Past Medical History:   Diagnosis Date    Acid reflux 2014    Acquired cyst of kidney 2007    Alzheimer disease     Arthritis     Basal cell carcinoma of lip 2005    Overview:  left upper lip nostril Overview:  left upper lip nostril    Basal cell papilloma 10/20/2009    CAD (coronary artery disease)     Cancer (HCC)     HX skin    COPD (chronic obstructive pulmonary disease) ([facility] [address]     DD (diverticular disease) 2014    Dementia     Depression     Diverticulosis of intestine 2014    Dyslipidemia     Gastroesophageal reflux disease 2014    Glaucoma 2014    H/O malignant neoplasm of skin 10/20/2009    H/O surgical procedure 2014    Overview:  RUQ removed 4/15/10 lung ca Overview:  RUQ removed 4/15/10 lung ca    Hematuria, microscopic 2009    History of surgical procedure 2014    Overview:  RUQ removed 4/15/10 lung ca for meal if he could tolerate     Assessment   Activity Tolerance  Activity Tolerance: Patient Tolerated treatment well     Discharge Recommendations:  Patient would benefit from continued therapy after discharge    Goals  Short term goals  Short term goal 1: Pt will complete transfers with supervision with LRAD  Short term goal 2: Pt will amb 25' x3 with LRAD and supervision  Short term goal 3: Pt will complete standing activities with B UE support and supervision    Plan    Plan  Times per week: 3-6  Current Treatment Recommendations: Strengthening, Balance Training, Functional Mobility Training, Transfer Training, ADL/Self-care Training, IADL Training, Neuromuscular Re-education, Endurance Training, Gait Training, Home Exercise Program, Equipment Evaluation, Education, & procurement, Safety Education & Training, Patient/Caregiver Education & Training  Safety Devices  Type of devices: All fall risk precautions in place     Therapy Time   Individual   Time In 1110   Time Out 1133   Minutes 23   Inconsistent transfer technique but cause no lob or safety risk. Pt hesitant to stay up in chair for a while but agreed to try. Pt shaky with standing and with gt but tolerated short distance in the room. Pt states his granddaughter checks in with him and his 2 children live close. He had a hard time telling me where he lived, but eventually he remembered.    10 minutes gt  13 minutes transfer/bed aren Lopez, PTA, 10/20/17 at 11:33 AM

## 2017-10-20 NOTE — PROGRESS NOTES
Huma Mays is a 80 y.o. male patient. Admitted with right lower lobar pneumonia, falls, closed head injury.     Current Facility-Administered Medications   Medication Dose Route Frequency Provider Last Rate Last Dose    piperacillin-tazobactam (ZOSYN) 3.375 g in dextrose 50 mL IVPB (premix)  3.375 g Intravenous Benay Schilder, MD   Stopped at 10/20/17 0315    allopurinol (ZYLOPRIM) tablet 100 mg  100 mg Oral Daily Rosalva Fabian MD   100 mg at 10/19/17 0914    aspirin chewable tablet 81 mg  81 mg Oral Daily Rosalva Fabian MD   81 mg at 10/19/17 0914    atenolol (TENORMIN) tablet 25 mg  25 mg Oral Daily Rosalva Fabian MD   25 mg at 10/19/17 0913    donepezil (ARICEPT) tablet 10 mg  10 mg Oral Nightly Rosalva Fabian MD   10 mg at 10/19/17 2222    mometasone-formoterol (Anniece Mends) 200-5 MCG/ACT inhaler 2 puff  2 puff Inhalation QAM Rosalva Fabian MD   2 puff at 10/20/17 0657    fish oil capsule 3,000 mg  3,000 mg Oral Nightly Rosalva Fabian MD   3,000 mg at 10/18/17 2136    ipratropium-albuterol (DUONEB) nebulizer solution 3 mL  1 vial Inhalation 4x Daily Rosalva Fabian MD   3 mL at 10/20/17 0656    latanoprost (XALATAN) 0.005 % ophthalmic solution 1 drop  1 drop Both Eyes Nightly Rosalva Fabian MD   1 drop at 10/19/17 2312    memantine (NAMENDA) tablet 5 mg  5 mg Oral BID Rosalva Fabian MD   5 mg at 10/19/17 2222    mirtazapine (REMERON) tablet 30 mg  30 mg Oral Nightly Rosalva Fabian MD   30 mg at 10/19/17 2222    nitroGLYCERIN (NITROSTAT) SL tablet 0.4 mg  0.4 mg Sublingual Q5 Min PRN Rosalva Fabian MD        antioxidant multivitamin (OCUVITE) tablet  1 tablet Oral BID Rosalva Fabian MD   1 tablet at 10/19/17 2222    potassium chloride (MICRO-K) extended release capsule 20 mEq  20 mEq Oral BID Rosalva Fabian MD   20 mEq at 10/19/17 2222    sertraline (ZOLOFT) tablet 50 mg  50 mg Oral Daily Rosalva Fabian MD   50 mg at 10/19/17 0900    simvastatin (ZOCOR) tablet 40 mg  40 mg Oral Nightly Willey Hammans, MD   40 mg at 10/19/17 2223    pantoprazole (PROTONIX) tablet 40 mg  40 mg Oral QAM AC Willey Hammans, MD   40 mg at 10/19/17 0557    sodium chloride flush 0.9 % injection 10 mL  10 mL Intravenous 2 times per day Willey Hammans, MD   10 mL at 10/19/17 2226    sodium chloride flush 0.9 % injection 10 mL  10 mL Intravenous PRN Willey Hammans, MD   10 mL at 10/18/17 1441    acetaminophen (TYLENOL) tablet 650 mg  650 mg Oral Q4H PRN Willey Hammans, MD        sodium chloride flush 0.9 % injection 10 mL  10 mL Intravenous PRN Willey Hammans, MD        magnesium hydroxide (MILK OF MAGNESIA) 400 MG/5ML suspension 30 mL  30 mL Oral Daily PRN Willey Hammans, MD        docusate sodium (COLACE) capsule 100 mg  100 mg Oral BID Willey Hammans, MD   100 mg at 10/19/17 2222    ondansetron (ZOFRAN) injection 4 mg  4 mg Intravenous Q6H PRN Willey Hammans, MD        enoxaparin (LOVENOX) injection 40 mg  40 mg Subcutaneous Daily Willey Hammans, MD   40 mg at 10/19/17 2223    albuterol (PROVENTIL) nebulizer solution 2.5 mg  2.5 mg Nebulization Q2H PRN Willey Hammans, MD         Allergies   Allergen Reactions    Adhesive Tape Rash     blistering    Brimonidine     Niacin And Related     Promethazine Hcl Other (See Comments)    Codeine Anxiety and Other (See Comments)     nightmares    Phenergan [Promethazine] Anxiety     Nightmares and hallucinations    Prednisone Anxiety     Principal Problem:    Pneumonia  Active Problems:    CAD (coronary artery disease) s/p ALIYAH to mid Circ. 3/2012    HTN (hypertension)    Depression    Dyslipidemia    Dementia    Alzheimer disease    Macular degeneration    Acid reflux    Glaucoma    Chronic obstructive pulmonary disease with acute exacerbation (HCC)    Obstructive sleep apnea syndrome    Metabolic encephalopathy    Blood pressure (!) 123/54, pulse 66, temperature 98.4 °F (36.9 °C), resp.  rate 16, height 6' (1.829 m), weight 190 lb 0.6 oz (86.2 kg), SpO2 95 %. Subjective:  Symptoms:  Stable. He reports malaise and weakness. No shortness of breath, cough, chest pain or chest pressure. Activity level: Impaired due to weakness. Pain:  He reports no pain. Objective:  General Appearance: In no acute distress. Vital signs: (most recent): Blood pressure (!) 123/54, pulse 66, temperature 98.4 °F (36.9 °C), resp. rate 16, height 6' (1.829 m), weight 190 lb 0.6 oz (86.2 kg), SpO2 95 %. Vital signs are normal.  No fever. Output: Producing urine. HEENT: Normal HEENT exam.    Lungs:  Normal effort and normal respiratory rate. He is not in respiratory distress. There are decreased breath sounds. Heart: Normal rate. Regular rhythm. Abdomen: Abdomen is soft. Bowel sounds are normal.   There is no abdominal tenderness. Neurological: Patient is alert. Pupils:  Pupils are equal, round, and reactive to light. Skin:  Warm and dry. Assessment:    Condition: In stable condition. (Right lower lobe pneumonia  Falls  Dementia  COPD  Hypertesnion). Plan:   (Lennart Backer is improved, plan is to go to rehab, change to by mouth medications upon discharge).        Jose Knapp PA-C  10/20/2017

## 2017-10-20 NOTE — CONSULTS
Physical Medicine & Rehabilitation  Consult Note      Admitting Physician: Isaiah Oliveira MD    Primary Care Provider: Daryl Jacques DO     Reason for Consult:  Asses rehab needs, promote physical and mental function, and decrease likelihood of re-admit to the hospital after discharge. History of Present Illness:    Manuel Avina is a 80 y.o. male admitted to [facility] on 10/17/2017. Patient was admitted to the ER after complaining of shortness of breath and congestion. Currently he was also falling at home his wife took him to the urgent care first in his pulse ox was 87% he was wheeled over to the ER via wheelchair prescribed oxygen and evaluated. He has since had consultation with pulmonologist Al Cevallos. He diagnosed him with acute COPD exacerbation secondary to bronchitis hypoxia and sleep apnea. He was prescribed DuoNeb delay or a and IV Solu-Medrol. He was also started on IV Rocephin chest x-ray showed no acute cortical pulmonary disease. Chronic interstitial lung changes-another chest x-ray did reveal an infiltrate see below. Fatigue   This is a recurrent problem. The current episode started in the past 7 days. The problem occurs constantly. The problem has been gradually improving. Associated symptoms include arthralgias, coughing, fatigue, myalgias and weakness. Pertinent negatives include no abdominal pain, chest pain, chills, fever, headaches, nausea, neck pain, numbness, rash or vomiting. The symptoms are aggravated by walking. The treatment provided mild relief. Their inpatient work up has included:    Imaging:    Imaging and other studies reviewed and discussed with patient and staff    Xr Chest Standard (2 Vw) Result Date: 10/17/2017  X-RAY: A CHEST, 2 VIEWS:   CLINICAL HISTORY:   weakness r/o pneumonia (hx pneumonia and copd)   COMPARISONS: 9/17/2017 FINDINGS:  Normal cardiac silhouette.  There are atherosclerotic calcifications in the aortic arch. There is coarsening of interstitial markings, increased from last exam. Suspect mild vascular congestion compared to last exam. No pleural effusion or pneumothorax. There are mild degenerative changes in spine. Bones are demineralized. INCREASED COARSENING INTERSTITIAL MARKINGS, SUSPECT MILD VASCULAR CONGESTION. Ct Head Wo Contrast           Result Date: 10/17/2017  EXAMINATION: CT HEAD WO CONTRAST  DATE AND TIME:10/17/2017 10:30 AM CLINICAL HISTORY: Severe headache.  fall at home r/o skull fracture or traumatic bleed  COMPARISON: 9/28/2016 TECHNIQUE:Axial CT from skull base to vertex without  contrast..  All CT scans at this facility use dose modulation, iterative reconstruction, and/or weight based dosing when appropriate to reduce radiation dose to as low as reasonably achievable. FINDINGS:  There is diffuse prominence of the CSF spaces of the ventricles and cerebral convexities consistent with significant volume loss. It should be noted that there can be significant overlap in the appearance of age-related changes and neurodegenerative disease. There are a few periventricular hypodensities consistent with normal aging. There is no parenchymal mass, or mass effect, There is no acute parenchymal hemorrhage or extra-axial fluid. The bony calvarium and skull base are normal.  Fluid level in left maxillary sinus. Minimal mucosal thickening in the right maxillary sinus. Polypoid mucosal changes in the right sphenoid sinus. No acute fracture. NO ACUTE INTRACRANIAL PATHOLOGY.             Ct Cervical Spine Wo Contrast        Result Date: 10/17/2017  EXAMINATION: CT CERVICAL SPINE WO CONTRAST   DATE AND TIME:10/17/2017 10:30 AM CLINICAL HISTORY:Acute posterior neck pain  fall at home r/o c-spine fx  COMPARISON: None TECHNIQUE:Helical scanning was performed from the skull base through the remainder of the cervical spine without intravenous contrast. 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 This is unchanged. No other significant abnormality is identified. ACUTE RIGHT LOWER LOBE INFILTRATE. INCIDENTAL STABLE CHANGES THROUGHOUT THE ABDOMEN AND PELVIS, AS DISCUSSED ABOVE. NO NEW FINDINGS. Labs:     labs reviewed and discussed with patient and staff    Lab Results   Component Value Date    POCGLU 156 09/17/2017    POCGLU 95 10/10/2016    POCGLU 93 10/09/2016    POCGLU 215 10/06/2016     Lab Results   Component Value Date     10/18/2017    K 3.9 10/18/2017     10/18/2017    CO2 29 10/18/2017    BUN 14 10/18/2017    CREATININE 0.89 10/18/2017    CALCIUM 9.2 10/18/2017    LABALBU 3.2 10/18/2017    BILITOT 0.9 10/18/2017    ALKPHOS 80 10/18/2017    AST 16 10/18/2017    ALT 11 10/18/2017     Lab Results   Component Value Date    WBC 10.7 10/18/2017    RBC 4.10 10/18/2017    HGB 12.4 10/18/2017    HCT 37.7 10/18/2017    MCV 91.8 10/18/2017    MCH 30.2 10/18/2017    MCHC 32.9 10/18/2017    RDW 14.8 10/18/2017     10/18/2017    MPV 8.6 07/20/2015     Lab Results   Component Value Date    VITD25 42.0 09/30/2016     Lab Results   Component Value Date    COLORU Yellow 10/17/2017    NITRU Negative 10/17/2017    GLUCOSEU Negative 10/17/2017    KETUA Negative 10/17/2017    UROBILINOGEN 1.0 10/17/2017    BILIRUBINUR Negative 10/17/2017     Lab Results   Component Value Date    PROTIME 11.2 10/17/2017     Lab Results   Component Value Date    INR 1.1 10/17/2017         I discussed results with patient.     Current Rehabilitation Assessments:    Rehabilitation:  Physical therapy: FIMS:  Bed Mobility:      Transfers: Sit to Stand: Contact guard assistance  Stand to sit: Contact guard assistance, Ambulation 1  Surface: level tile  Device: Rolling Walker  Assistance: Contact guard assistance, Minimal assistance  Quality of Gait: flexed trunk, wbos, bilateral knee instability  Distance: 5 feet, 15 x 2 feet  Comments: agreed to stay up in chair for Procedure Laterality Date    APPENDECTOMY      CHOLECYSTECTOMY, LAPAROSCOPIC  3/13/03    COLONOSCOPY  10/6/06    CORONARY ANGIOPLASTY WITH STENT PLACEMENT  3/2012    CYSTOSCOPY  2005    KIDNEY STONE SURGERY  several    LUNG BIOPSY Right 4/13/10    OTHER SURGICAL HISTORY      cardiac stents    SKIN BIOPSY      SKIN CANCER EXCISION  9/12/06    UPPER GASTROINTESTINAL ENDOSCOPY      VASCULAR SURGERY           Allergies:      Allergies   Allergen Reactions    Adhesive Tape Rash     blistering    Brimonidine     Niacin And Related     Promethazine Hcl Other (See Comments)    Codeine Anxiety and Other (See Comments)     nightmares    Phenergan [Promethazine] Anxiety     Nightmares and hallucinations    Prednisone Anxiety        Current Medications:     Current Facility-Administered Medications: amoxicillin-clavulanate (AUGMENTIN) 875-125 MG per tablet 1 tablet, 1 tablet, Oral, BID  gentamicin (GARAMYCIN) 0.3 % ophthalmic solution 1 drop, 1 drop, Both Eyes, TID  artificial tears ophthalmic solution 1 drop, 1 drop, Both Eyes, 4x Daily PRN  allopurinol (ZYLOPRIM) tablet 100 mg, 100 mg, Oral, Daily  aspirin chewable tablet 81 mg, 81 mg, Oral, Daily  atenolol (TENORMIN) tablet 25 mg, 25 mg, Oral, Daily  donepezil (ARICEPT) tablet 10 mg, 10 mg, Oral, Nightly  mometasone-formoterol (DULERA) 200-5 MCG/ACT inhaler 2 puff, 2 puff, Inhalation, QAM  fish oil capsule 3,000 mg, 3,000 mg, Oral, Nightly  ipratropium-albuterol (DUONEB) nebulizer solution 3 mL, 1 vial, Inhalation, 4x Daily  latanoprost (XALATAN) 0.005 % ophthalmic solution 1 drop, 1 drop, Both Eyes, Nightly  memantine (NAMENDA) tablet 5 mg, 5 mg, Oral, BID  mirtazapine (REMERON) tablet 30 mg, 30 mg, Oral, Nightly  nitroGLYCERIN (NITROSTAT) SL tablet 0.4 mg, 0.4 mg, Sublingual, Q5 Min PRN  antioxidant multivitamin (OCUVITE) tablet, 1 tablet, Oral, BID  potassium chloride (MICRO-K) extended release capsule 20 mEq, 20 mEq, Oral, BID  sertraline stronger and return home. Family History:         Problem Relation Age of Onset    Family history unknown: Yes       Review of Systems:     Review of Systems   Constitutional: Positive for activity change and fatigue. Negative for appetite change, chills, fever and unexpected weight change. HENT: Negative for ear discharge, ear pain, facial swelling, hearing loss and trouble swallowing. Eyes: Negative for photophobia, pain and redness. Respiratory: Positive for cough, shortness of breath and wheezing. Negative for choking and chest tightness. Cardiovascular: Positive for leg swelling. Negative for chest pain and palpitations. Gastrointestinal: Negative for abdominal distention, abdominal pain, anal bleeding, blood in stool, constipation, nausea and vomiting. Endocrine: Positive for cold intolerance. Genitourinary: Negative for difficulty urinating, dysuria, flank pain, frequency and urgency. Musculoskeletal: Positive for arthralgias, gait problem and myalgias. Negative for neck pain and neck stiffness. Skin: Negative for color change, pallor, rash and wound. Allergic/Immunologic: Positive for immunocompromised state. Neurological: Positive for dizziness and weakness. Negative for tremors, syncope, facial asymmetry, speech difficulty, light-headedness, numbness and headaches. Hematological: Negative for adenopathy. Does not bruise/bleed easily. Psychiatric/Behavioral: Positive for confusion and sleep disturbance. Negative for agitation, behavioral problems, decreased concentration, dysphoric mood, hallucinations, self-injury and suicidal ideas. The patient is not nervous/anxious and is not hyperactive. All other systems reviewed and are negative.             Physical Exam:    /63   Pulse 60   Temp 98.4 °F (36.9 °C)   Resp 18   Ht 6' (1.829 m)   Wt 190 lb 0.6 oz (86.2 kg)   SpO2 99%   BMI 25.77 kg/m²      Physical Exam   Constitutional: Vital signs are normal. He person. Oriented to place. Disoriented to street and number. Oriented to country and city. Follows 1 step commands. Attention: decreased. Concentration: decreased. Speech: speech is normal   Level of consciousness: drowsy  Knowledge: inconsistent with education. Able to name object. Able to read. Able to repeat. Cranial Nerves     CN III, IV, VI   Pupils are equal, round, and reactive to light. Extraocular motions are normal.     Motor Exam   Muscle bulk: decreased  Overall muscle tone: normal    Sensory Exam   Right arm light touch: decreased from fingers  Left arm light touch: decreased from fingers  Right leg light touch: decreased from ankle  Left leg light touch: decreased from ankle    Gait, Coordination, and Reflexes     Gait  Gait: wide-based            Diagnostics:  X-RAY: A CHEST, 2 VIEWS:         CLINICAL HISTORY:   weakness r/o pneumonia (hx pneumonia and copd)          COMPARISONS: 9/17/2017       FINDINGS:  Normal cardiac silhouette. There are atherosclerotic calcifications in the aortic arch. There is coarsening of interstitial markings, increased from last exam. Suspect mild vascular congestion compared to last exam. No pleural effusion or    pneumothorax. There are mild degenerative changes in spine. Bones are demineralized.             Impression   INCREASED COARSENING INTERSTITIAL MARKINGS, SUSPECT MILD VASCULAR CONGESTION.           No results found for this or any previous visit (from the past 24 hour(s)). Impression:    1. Impaired mobility and ADLs due to Cardiopulmonary debility secondary to acute pneumonia and COPD exacerbation  2. Fatigue and Malaise      Complex Active General Medical Issues that complicate care Assess & Plan:     1. Principal Problem:    Pneumonia  Active Problems:  1. CAD (coronary artery disease) s/p ALIYAH to mid Circ. 3/2012,HTN (hypertension),  Dyslipidemia-Tenormin, Zocor  2. Depression-Zoloft  3.    Dementia, Alzheimer disease--Aricept,

## 2017-10-21 NOTE — PROGRESS NOTES
Occupational Therapy  Facility/Department: [facility]  Daily Treatment Note  NAME: Mario Pascual : 1934  MRN: [id number]    Date of Service: 10/21/2017    Patient Diagnosis(es): There were no encounter diagnoses. has a past medical history of Abnormality of gait and mobility; Acid reflux; Acquired cyst of kidney; Alzheimer disease; Arthritis; Basal cell carcinoma of lip; Basal cell papilloma; CAD (coronary artery disease); Cancer ([facility] Closed head injury; COPD (chronic obstructive pulmonary disease) ([facility] [address] [facility] DD (diverticular disease); Dementia; Depression; Diverticulosis of intestine; Dyslipidemia; Gastroesophageal reflux disease; Glaucoma; H/O malignant neoplasm of skin; H/O surgical procedure; Hematuria, microscopic; History of surgical procedure; History of tobacco use; HTN (hypertension); Impaired mobility and activities of daily living; Kidney stone; Macular degeneration; MI (myocardial infarction); Neuromuscular disorder ([facility] NSVT (nonsustained ventricular tachycardia) ([facility] Obstructive apnea; Orthostasis; Pneumonia; and Syncope.   has a past surgical history that includes Upper gastrointestinal endoscopy; Cholecystectomy, laparoscopic (3/13/03); Kidney stone surgery (several); Colonoscopy (10/6/06); Cystoscopy (); Skin cancer excision (06); Lung biopsy (Right, 4/13/10); Coronary angioplasty with stent (3/2012); other surgical history; Appendectomy; skin biopsy; and vascular surgery. Restrictions  Restrictions/Precautions  Restrictions/Precautions: Fall Risk  Subjective   General  Chart Reviewed: Yes  Patient assessed for rehabilitation services?: Yes  Family / Caregiver Present: No  Referring Practitioner: Dr. Dickinson Session    Diagnosis: Closed head injury s/p fall   Subjective  Subjective: Pt questionable historian.    Pre Treatment Pain Screening  Pain at present: 0  Pain Assessment  Pain Level: 0   Orientation     Objective        Coordination  Fine Motor: pt able to assemble lg nuts/bolts with MIN difficulty however req'ed extra time 2° req'ing MAX v/c's to problem solve to turn nuts/bolts to proper alignment for assembling      Cognition  Cognition Comment: pt able to correctly sequence 12/12 months, able to match 6/10 holidays to correct month and able to correctly cover 22/25 cards on card coverall. pt req'ed v/c's to stay on task with holiday activity and coverall activity. Extra time req'ed        Assessment   Performance deficits / Impairments: Decreased functional mobility ; Decreased ADL status; Decreased strength;Decreased endurance;Decreased cognition;Decreased balance;Decreased safe awareness;Decreased fine motor control;Decreased coordination  Decision Making: High Complexity  History: Multi comorb  Exam: 9 prformance imp  Assistance / Modification: CGA with vc  Discharge Recommendations: Continue to assess pending progress  REQUIRES OT FOLLOW UP: Yes  Activity Tolerance  Activity Tolerance: Patient Tolerated treatment well  Safety Devices  Safety Devices in place: Yes  Type of devices: All fall risk precautions in place       Discharge Recommendations:  Continue to assess pending progress     Plan   Plan  Times per week: 5-7x/wk, 15-90 mins  Plan weeks: 2-3  Current Treatment Recommendations: Strengthening, Balance Training, Functional Mobility Training, Endurance Training, Cognitive Reorientation, Self-Care / ADL, Cognitive/Perceptual Training  G-Code     OutComes Score                                             Goals  Patient Goals   Patient goals : To go home.         Therapy Time   Individual Concurrent Group Co-treatment   Time In 1500         Time Out 1600         Minutes 60               Electronically signed by GLADYS Parra on 10/21/2017 at 4:06 PM  GLADYS Parra

## 2017-10-21 NOTE — PROGRESS NOTES
Smoking history  ?  20 pack years  []   Pulmonary Disorder  (acute or chronic)  [x]   Severe or Chronic w/ Exacerbation  []     Surgical Status No [x]   Surgeries     General []   Surgery Lower []   Abdominal Thoracic or []   Upper Abdominal Thoracic with  PulmonaryDisorder  []     Chest X-ray Clear/Not  Ordered     [x]  Chronic Changes  Results Pending  []  Infiltrates, atelectasis, pleural effusion, or edema  []  Infiltrates in more than one lobe []  Infiltrate + Atelectasis, &/or pleural effusion  []    Respiratory Pattern Regular,  RR = 12-20 [x]  Increased,  RR = 21-25 []  LAURA, irregular,  or RR = 26-30 []  Decreased FEV1  or RR = 31-35 []  Severe SOB, use  of accessory muscles, or RR ? 35  []    Mental Status Alert, oriented,  Cooperative [x]  Confused but Follows commands []  Lethargic or unable to follow commands []  Obtunded  []  Comatose  []    Breath Sounds Clear to  auscultation  []  Decreased unilaterally or  in bases only [x]  Decreased  bilaterally  []  Crackles or intermittent wheezes []  Wheezes []    Cough Strong, Spontan., & nonproductive [x]  Strong,  spontaneous, &  productive []  Weak,  Nonproductive []  Weak, productive or  with wheezes []  No spontaneous  cough or may require suctioning []    Level of Activity Ambulatory []  Ambulatory w/ Assist  [x]  Non-ambulatory []  Paraplegic []  Quadriplegic []    Total    Score:___5____     Triage Score:___5_____      Tri       Triage:     1. (>20) Freq: Q3    2. (16-20) Freq: Q4   3. (11-15) Freq: QID & Albuterol Q2 PRN    4. (6-10) Freq: TID & Albuterol Q2 PRN    5. (0-5) Freq Q4prn

## 2017-10-21 NOTE — PROGRESS NOTES
Occupational Therapy   Occupational Therapy Initial Assessment  Date: 10/21/2017   Patient Name: Cara Wang MRN: [id number]     : 1934    Patient Diagnosis(es): There were no encounter diagnoses. has a past medical history of Abnormality of gait and mobility; Acid reflux; Acquired cyst of kidney; Alzheimer disease; Arthritis; Basal cell carcinoma of lip; Basal cell papilloma; CAD (coronary artery disease); Cancer ([facility] Closed head injury; COPD (chronic obstructive pulmonary disease) ([facility] [address] DD (diverticular disease); Dementia; Depression; Diverticulosis of intestine; Dyslipidemia; Gastroesophageal reflux disease; Glaucoma; H/O malignant neoplasm of skin; H/O surgical procedure; Hematuria, microscopic; History of surgical procedure; History of tobacco use; HTN (hypertension); Impaired mobility and activities of daily living; Kidney stone; Macular degeneration; MI (myocardial infarction); Neuromuscular disorder ([facility] [address] NSVT (nonsustained ventricular tachycardia) ([facility] Obstructive apnea; Orthostasis; Pneumonia; and Syncope.   has a past surgical history that includes Upper gastrointestinal endoscopy; Cholecystectomy, laparoscopic (3/13/03); Kidney stone surgery (several); Colonoscopy (10/6/06); Cystoscopy (); Skin cancer excision (06); Lung biopsy (Right, 4/13/10); Coronary angioplasty with stent (3/2012); other surgical history; Appendectomy; skin biopsy; and vascular surgery. Restrictions  Restrictions/Precautions  Restrictions/Precautions: Fall Risk    Subjective   General  Chart Reviewed: Yes  Patient assessed for rehabilitation services?: Yes  Family / Caregiver Present: No  Referring Practitioner: Dr. Liz Obrien    Diagnosis: Closed head injury s/p fall   Subjective  Subjective: Pt questionable historian.    Pain Assessment  Patient Currently in Pain: No  Pain Assessment: 0-10  Pre Treatment Pain Screening  Pain at present: 0  Scale Used: Numeric Score  Oxygen Shoulder Ext: 4/5  L Shoulder ABduction: 4/5  L Shoulder ADduction: 4/5  L Elbow Flex: 4/5  L Elbow Ext: 4/5  L Hand Grasp: 4/5  L Hand Release: 4/5  RUE Strength  Gross RUE Strength: Exceptions to [facility]  R Shoulder Flex: 4/5  R Shoulder Ext: 4/5  R Shoulder ABduction: 4/5  R Shoulder ADduction: 4/5  R Elbow Flex: 4/5  R Elbow Ext: 4/5  R Hand Grasp: 4/5  R Hand Release: 4/5     Hand Dominance  Hand Dominance: Left            Assessment   Performance deficits / Impairments: Decreased functional mobility ; Decreased ADL status; Decreased strength;Decreased endurance;Decreased cognition;Decreased balance;Decreased safe awareness;Decreased fine motor control;Decreased coordination  Decision Making: High Complexity  History: Multi comorb  Exam: 9 prformance imp  Assistance / Modification: CGA with vc  Discharge Recommendations: Continue to assess pending progress  REQUIRES OT FOLLOW UP: Yes  Safety Devices  Safety Devices in place: Yes  Type of devices: All fall risk precautions in place        Discharge Recommendations:  Continue to assess pending progress     Plan   Plan  Times per week: 5-7x/wk, 15-90 mins  Plan weeks: 2-3  Current Treatment Recommendations: Strengthening, Balance Training, Functional Mobility Training, Endurance Training, Cognitive Reorientation, Self-Care / ADL, Cognitive/Perceptual Training    Goals  Patient Goals   Patient goals : To go home.    []  Patient will complete self care as followed using the recommended adaptive equipment and/or adaptive techniques as instructed:  Feeding: Set up   Grooming: Set up   Bathing: Supervision  UE Dressing: IND  LE Dressing: Mod IND  Toileting: Supervision  Toilet Transfers: Supervision  Tub Transfers: Supervision   [x]  Patient will sequence self-care routine without verbal/tactile cues. []  Patient will improve  UE sensation and/or utilize compensatory techniques for safe completion of self-care as projected.      [x]  Patient will improve static and dynamic standing balance to complete pants management at Supervision level     []  Patient will improve  UE Function (AROM, strength, motor control, tone normalization) to complete ADLs as projected. [x]  Patient will improve functional endurance to tolerate/complete 20-30 minutes of ADLs. [x]  Patient will improve B UE strength and endurance to 4+/5 in order to participate in self-care activities as projected. [x]  Patient will improve B hand fine motor coordination to [facility] in order to manage clothing fasteners/self-care containers in a timely manner     []  Patient will improve visual perception to  in order to improve participation in self care and leisure activities     []  Patient will perform kitchen mobility at device level without episodes of LOB and good safety awareness      [x]  Patient will perform basic room mobility at Supervision level. [x]  Patient will access appropriate D/C site with as few architectural barriers as possible. []  Patient and/or caregiver will demonstrate understanding of hip precautions with  verbal/tactile cues. []  Patient and/or caregiver will demonstrate understanding of energy conservation techniques with  verbal/tactile cues. []  Patient and/or caregiver will demonstrate understanding of recommended HEP for .         [x]  Patient's cognition will improve to safely perform ADLs:  Comprehension: Supervision   Expression: Supervision   Social Interaction: IND  Problem Solving: Supervision  Memory: Min A       Therapy Time   Individual Concurrent Group Co-treatment   Time In 0900         Time Out 1000         Minutes 60               Electronically signed by HIRAM Burnham/L on 10/21/2017 at 11:43 AM  HIRAM Burnham/CHRISTINE

## 2017-10-21 NOTE — PROGRESS NOTES
Facility/Department: [facility]  Rehabilitation Initial Assessment    NAME: Dwight Anand   : 1934  MRN: [id number]    Date of Service: 10/21/2017    Patient Rehab Diagnosis(es):  Closed Head Injury S/P fall    Past Medical History:   Diagnosis Date    Abnormality of gait and mobility     Acid reflux 2014    Acquired cyst of kidney 2007    Alzheimer disease     Arthritis     Basal cell carcinoma of lip 2005    Overview:  left upper lip nostril Overview:  left upper lip nostril    Basal cell papilloma 10/20/2009    CAD (coronary artery disease)     Cancer (HCC)     HX skin    Closed head injury     [facility]ch[facility] obstructive [facility]) [facility] [address]     DD (diverticular disease) 2014    Dementia     Depression     Diverticulosis of intestine 2014    Dyslipidemia     Gastroesophageal reflux disease 2014    Glaucoma 2014    H/O malignant neoplasm of skin 10/20/2009    H/O surgical procedure 2014    Overview:  RUQ removed 4/15/10 lung ca Overview:  RUQ removed 4/15/10 lung ca    Hematuria, microscopic 2009    History of surgical procedure 2014    Overview:  RUQ removed 4/15/10 lung ca     History of tobacco use 2009    HTN (hypertension)     Impaired mobility and activities of daily living     Kidney stone     Macular degeneration     MI (myocardial infarction)     Neuromuscular disorder ([facility] [address]     NSVT (nonsustained ventricular tachycardia) ([facility] [address] 3/29/2012    Obstructive apnea 2014    Overview:  Non compliant w/ CPAP, uses 2.5 L NC with sleep     Orthostasis     Pneumonia     Syncope 2012     Past Surgical History:   Procedure Laterality Date    APPENDECTOMY      CHOLECYSTECTOMY, LAPAROSCOPIC  3/13/03    COLONOSCOPY  10/6/06    CORONARY ANGIOPLASTY WITH STENT PLACEMENT  3/2012    CYSTOSCOPY      KIDNEY STONE SURGERY  several    LUNG BIOPSY Right 4/13/10    OTHER SURGICAL HISTORY      cardiac stents Balance  Sitting - Static: Fair;+  Sitting - Dynamic: Fair;+  Standing - Static: Fair;-  Standing - Dynamic: Fair;-  Comments: unsteady in standing, impaired safety awareness        Car transfers:Not tested    Walk 10 ft:Not tested    Walk 50 ft with 2 turns:NT    Walk 150 ft in corridor:NT    Walking 10 ft on unlevel surface:NT    Picking up objects: Not tested      Assessment   Treatment Diagnosis: Gait deficits  Prognosis: Guarded; Fair  Patient Education: role of PT on rehab floor    Body structures, Functions, Activity limitations: Decreased functional mobility ; Decreased strength;Decreased safe awareness;Decreased balance;Decreased high-level IADLs  Decision Making: Medium Complexity  History: high  Exam: mod  Clinical Presentation: unstable    Activity Tolerance  Activity Tolerance: Patient Tolerated treatment well           Plan   Plan  Times per week: 5-7  Plan weeks: 1-2  Current Treatment Recommendations: Strengthening, Balance Training, Functional Mobility Training, Transfer Training, ADL/Self-care Training, IADL Training, Neuromuscular Re-education, Endurance Training, Gait Training, Home Exercise Program, Equipment Evaluation, Education, & procurement, Safety Education & Training, Patient/Caregiver Education & Training  Safety Devices  Type of devices:  All fall risk precautions in place    Pts Goal:              PT Goals:  · Pt will be Supervised with bed mobility  · Pt will be Supervised with bed transfers  · Pt will be Supervised to CGA with gait with appropriate device 200 feet  · Pt will be  S with 4-6\" stairs with 1 rail   · Pt will demonstrate improved motor control as evidenced by meeting above goals  · Pt will demonstrate improved balance as evidenced by SLS 5 sec B LE with UE light support  · Pt will be S with HEP    ELOS: Plan weeks: 1-2    Patient rights have been reviewed:  [x] yes [] no       Therapy Time   Individual Concurrent Group Co-treatment   Time In 7322         Time Out 8972

## 2017-10-22 NOTE — PROGRESS NOTES
Subjective: The patient complains of moderate  acute pain relieved by rest and exacerbated by activity. I am concerned about patients fatigue. ROS x10: The patient also complains of severely impaired mobility and activities of daily living. Otherwise no new problems with vision, hearing, nose, mouth, throat, dermal, cardiovascular, GI, , pulmonary, musculoskeletal, psychiatric or neurological. See Rehab H&P on Rehab chart dated . Vital signs:  BP (!) 125/59   Pulse 65   Temp 98 °F (36.7 °C) (Oral)   Resp 18   Ht 6' (1.829 m)   Wt 190 lb (86.2 kg)   SpO2 96%   BMI 25.77 kg/m²   I/O:   PO/Intake:  fair PO intake, no problems observed or reported. Bowel/Bladder:  continent, no problems noted. General:  Patient is well developed, adequately nourished, non-obese and     well kempt. HEENT:    PERRLA, hearing intact to loud voice, external inspection of ear     and nose benign. Inspection of lips, tongue and gums benign  Musculoskeletal: No significant change in strength or tone. All joints stable. Inspection and palpation of digits and nails show no clubbing,       cyanosis or inflammatory conditions. Neuro/Psychiatric: Affect: flat but pleasant. Alert and oriented to person, place and     situation. No significant change in deep tendon reflexes or     sensation  Lungs:  CTA-B. Respiration effort is normal at rest.     Heart:   S1 = S2, RRR. No loud murmurs. Abdomen:  Soft, non-tender, no enlargement of liver or spleen. Extremities:  No significant lower extremity edema or tenderness. Skin:   Intact to general survey, no visualized or palpated problems.     Rehabilitation:  Physical therapy: FIMS:  Bed Mobility:      Transfers: Sit to Stand: Contact guard assistance  Stand to sit: Contact guard assistance, Ambulation 1  Surface: carpet  Device: Rolling Walker  Other Apparatus: O2  Assistance: Contact guard assistance, Minimal assistance  Quality of Gait: flexed trunk, wbos, bilateral knee instability, decreased heel strike iglesia  Distance: 100 feet  Comments: tolerated longer distance this pm but was fatigued,      FIMS: Bed, Chair, Wheel Chair: 4 - Requires steadying assistance only <25% assist  and/or requires assist with one leg only  Walk: 1 - Total Assistance Walks/operates wheelchair < 50 feet OR requires two or more people OR patient performs < 25% of locomotion effort  Distance Walked: 5 feet,  , Assessment: Pt appears to be near functional baseline however reports not as strong as usual and more fatigued. Pt with strength deficits and decreased safety awareness with use of AD requiring consistent verbal cues. Occupational therapy: FIMS:  Eatin - Feeds self with setup/supervision/cues and/or requires only setup/supervision/cues to perform tube feedings  Groomin - Able to perform 3-4 tasks with touching help  Bathin - Able to bathe 3-4 areas  Dressing-Upper: 3 - Requires assist with 2 tasks  Dressing-Lower: 3 - Requires assist with 2-3 parts of dressing  Toiletin - Requires steadying assistance only  Toilet Transfer: 4 - Requires steadying assistance only < 25% assist  Tub Transfer: 0 - Activity does not occur  Shower Transfer: 4 - Minimal contact assistance, pt. expends 75% or more effort (CGA),  ,      Speech therapy: FIMS: Comprehension: 4 - Patient understands basic needs 75-90%+ of the time  Expression: 4 - Expresses basic ideas/needs 75-90%+ of the time  Social Interaction: 5 - Patient is appropriate with supervision/cues  Problem Solvin - Patient solves simple/routine tasks 75-90%+   Memory: 3 - Patient remembers 50%-74% of the time      Lab/X-ray studies reviewed, analyzed and discussed with patient and staff:   No results found for this or any previous visit (from the past 24 hour(s)).     Xr Chest Standard (2 Vw)    Result Date: 10/17/2017  X-RAY: A CHEST, 2 VIEWS:   CLINICAL HISTORY:   weakness r/o pneumonia (hx pneumonia and copd) the remainder of the cervical spine without intravenous contrast. Sagittal and coronal reformats were obtained. The lack of contrast limits CT sensitivity of the soft tissues. All CT scans at this facility use dose modulation, iterative reconstruction, and/or weight based dosing when appropriate to reduce radiation dose to as low as reasonably achievable. FINDINGS: No acute fracture or subluxation. There is mild  cervical spondylosis with degenerative changes primarily at C6-7. Uncovertebral prominence at C5-6 and C6-7 on the right noted. Minimal centrilobular emphysematous changes in the lung apices. The remaining visualized soft tissues, show no additional acute or significant finding. NO ACUTE CERVICAL SPINE INJURIES. Ct Abdomen Pelvis W Iv Contrast Additional Contrast? Oral    Result Date: 10/17/2017  EXAMINATION: CT ABDOMEN AND PELVIS W IV CONTRAST: DATE AND TIME: 10/17/2017 at 10:45 AM. CLINICAL HISTORY: ACUTE EPIGASTRIC PAIN. COPIOUS DIARRHEA, WEAKNESS. ASSESS FOR COLITIS VS PARTIAL SMALL BOWEL OBSTRUCTION. COMPARISON:  None. TECHNIQUE: Contiguous axial CT sections of the abdomen and pelvis. 100 cc's of IV contrast given. All CT scans at this facility use dose modulation, iterative reconstruction, and/or weight based dosing when appropriate to reduce radiation dose to as low as reasonably achievable. FINDINGS: Incidental note is made of a right lower lobe infiltrate with areas of groundglass opacity and consolidation with air bronchograms. The liver, spleen, and pancreas are unremarkable. Nonobstructing renal stones bilaterally. Left renal cyst again  noted. Borderline prominence of the right calyces again seen on presumably this patient's baseline study. No signs of obstructive uropathy. A 3.1 cm infrarenal abdominal aortic aneurysm. This is unchanged. No diverticulitis or colitis.  There are calcifications layering along the dependent portion of the right

## 2017-10-22 NOTE — H&P
HISTORY & PHYSICAL       DATE OF ADMISSION:  10/20/2017    DATE OF SERVICE:  10/21/17    Subjective:    Vidya Ojeda., 80 y.o. male presents today with:     CHIEF COMPLAINT:  weakness     HPI    80year old male with recent CHI with functional loss    The patient has stabilized medically and is able to participate at acute level rehab but is too medically complex for SNF due to [address]:    Imaging and other studies reviewed and discussed with patient and staff    Xr Chest Standard (2 Vw)    Result Date: 10/17/2017  X-RAY: A CHEST, 2 VIEWS:   CLINICAL HISTORY:   weakness r/o pneumonia (hx pneumonia and copd)   COMPARISONS: 9/17/2017 FINDINGS:  Normal cardiac silhouette. There are atherosclerotic calcifications in the aortic arch. There is coarsening of interstitial markings, increased from last exam. Suspect mild vascular congestion compared to last exam. No pleural effusion or pneumothorax. There are mild degenerative changes in spine. Bones are demineralized. INCREASED COARSENING INTERSTITIAL MARKINGS, SUSPECT MILD VASCULAR CONGESTION. Ct Head Wo Contrast    Result Date: 10/17/2017  EXAMINATION: CT HEAD WO CONTRAST  DATE AND TIME:10/17/2017 10:30 AM CLINICAL HISTORY: Severe headache.  fall at home r/o skull fracture or traumatic bleed  COMPARISON: 9/28/2016 TECHNIQUE:Axial CT from skull base to vertex without  contrast..  All CT scans at this facility use dose modulation, iterative reconstruction, and/or weight based dosing when appropriate to reduce radiation dose to as low as reasonably achievable. FINDINGS:  There is diffuse prominence of the CSF spaces of the ventricles and cerebral convexities consistent with significant volume loss. It should be noted that there can be significant overlap in the appearance of age-related changes and neurodegenerative disease. There are a few periventricular hypodensities consistent with normal aging.  There is no parenchymal 10/21/2017    GLUCOSEU Negative 10/21/2017    KETUA Negative 10/21/2017    UROBILINOGEN 1.0 10/21/2017    BILIRUBINUR Negative 10/21/2017     Lab Results   Component Value Date    PROTIME 11.2 10/17/2017     Lab Results   Component Value Date    INR 1.1 10/17/2017         I discussed results with patient. The patient remains highly medically complex and continues to have severe problems with activities of daily living and mobility. The patient was assessed to be able to tolerate intensive rehabilitation and therefore was admitted to Rehabilitation to address these needs. Prior Function; everyday activities:     independent    Prior Device(s) used:    cane    History of falls:     yes      In depth analysis of complex functional data; the patient has been:    Current Rehabilitation Assessments:    Physical therapy: FIMS:  Bed Mobility:      Transfers: Sit to Stand: Contact guard assistance  Stand to sit: Contact guard assistance, Ambulation 1  Surface: carpet  Device: Rolling Walker  Other Apparatus: O2  Assistance: Contact guard assistance, Minimal assistance  Quality of Gait: flexed trunk, wbos, bilateral knee instability, decreased heel strike iglesia  Distance: 100 feet  Comments: tolerated longer distance this pm but was fatigued,      FIMS: Bed, Chair, Wheel Chair: 3 - Requires 25-49% assistance to transfer  Walk: 1 - Total Assistance Walks/operates wheelchair < 50 feet OR requires two or more people OR patient performs < 25% of locomotion effort  Distance Walked: 5 feet,  , Assessment: Pt appears to be near functional baseline however reports not as strong as usual and more fatigued. Pt with strength deficits and decreased safety awareness with use of AD requiring consistent verbal cues.     Occupational therapy: FIMS:  Eatin - Feeds self with setup/supervision/cues and/or requires only setup/supervision/cues to perform tube feedings  Groomin - Requires setup/cues to do all tasks  Bathin - Able to bathe all 10 areas with setup/sup/cues  Dressing-Upper: 0 - Did not occur  Dressing-Lower: 5 - Requires setup/supervision/cues and/or staff applies TEDS/prosthesis/brace only (to don and doff socks)  Toiletin - Requires setup/supervision/cues  Toilet Transfer: 4 - Requires steadying assistance only < 25% assist  Tub Transfer: 0 - Activity does not occur  Shower Transfer: 4 - Minimal contact assistance, pt. expends 75% or more effort (CGA),  ,      Speech therapy: FIMS: Comprehension: 4 - Patient understands basic needs 75-90%+ of the time  Expression: 4 - Expresses basic ideas/needs 75-90%+ of the time  Social Interaction: 5 - Patient is appropriate with supervision/cues  Problem Solving: 3 - Patient solves simple/routine tasks 50%-74%  Memory: 3 - Patient remembers 50%-74% of the time     Prior to admission patient was independent with all ADLs and mobility and did not require any outside services.        Past Medical History:   Diagnosis Date    Abnormality of gait and mobility     Acid reflux 2014    Acquired cyst of kidney 2007    Alzheimer disease     Arthritis     Basal cell carcinoma of lip 2005    Overview:  left upper lip nostril Overview:  left upper lip nostril    Basal cell papilloma 10/20/2009    CAD (coronary artery disease)     Cancer (HCC)     HX skin    Closed head injury     [facility] [address]     DD (diverticular disease) 2014    Dementia     Depression     Diverticulosis of intestine 2014    Dyslipidemia     Gastroesophageal reflux disease 2014    Glaucoma 2014    H/O malignant neoplasm of skin 10/20/2009    H/O surgical procedure 2014    Overview:  RUQ removed 4/15/10 lung ca Overview:  RUQ removed 4/15/10 lung ca    Hematuria, microscopic 2009    History of surgical procedure 2014    Overview:  RUQ removed 4/15/10 lung ca     History of tobacco use 2009    HTN (hypertension)  Impaired mobility and activities of daily living     Kidney stone     Macular degeneration     MI (myocardial infarction)     Neuromuscular disorder ([facility] [address]     NSVT (nonsustained ventricular tachycardia) ([facility] [address] 3/29/2012    Obstructive apnea 7/29/2014    Overview:  Non compliant w/ CPAP, uses 2.5 L NC with sleep     Orthostasis     Pneumonia     Syncope 6/28/2012       Past Surgical History:   Procedure Laterality Date    APPENDECTOMY      CHOLECYSTECTOMY, LAPAROSCOPIC  3/13/03    COLONOSCOPY  10/6/06    CORONARY ANGIOPLASTY WITH STENT PLACEMENT  3/2012    CYSTOSCOPY  2005    KIDNEY STONE SURGERY  several    LUNG BIOPSY Right 4/13/10    OTHER SURGICAL HISTORY      cardiac stents    SKIN BIOPSY      SKIN CANCER EXCISION  9/12/06    UPPER GASTROINTESTINAL ENDOSCOPY      VASCULAR SURGERY         Current Facility-Administered Medications   Medication Dose Route Frequency Provider Last Rate Last Dose    acetaminophen (TYLENOL) tablet 650 mg  650 mg Oral Q4H PRN Hong Marc MD        docusate sodium (COLACE) capsule 100 mg  100 mg Oral BID Hong Marc MD   100 mg at 10/21/17 2146    enoxaparin (LOVENOX) injection 40 mg  40 mg Subcutaneous Daily Hong Marc MD   40 mg at 10/21/17 2148    magnesium hydroxide (MILK OF MAGNESIA) 400 MG/5ML suspension 30 mL  30 mL Oral Daily PRN Hong Marc MD        albuterol (PROVENTIL) nebulizer solution 2.5 mg  2.5 mg Nebulization Q2H PRN Hong Marc MD        allopurinol (ZYLOPRIM) tablet 100 mg  100 mg Oral Daily Hong Marc MD   100 mg at 10/21/17 0817    amoxicillin-clavulanate (AUGMENTIN) 875-125 MG per tablet 1 tablet  1 tablet Oral BID Hong Marc MD   1 tablet at 10/21/17 2145    antioxidant multivitamin (OCUVITE) tablet  1 tablet Oral BID Hong Marc MD   1 tablet at 10/21/17 2146    artificial tears ophthalmic solution 1 drop  1 drop Both Eyes 4x Daily PRN Hong Marc MD        aspirin chewable tablet 81 mg  81 mg Oral Daily Yannick Carrier, MD   81 mg at 10/21/17 0818    atenolol (TENORMIN) tablet 25 mg  25 mg Oral Daily Yannick Carrier, MD   25 mg at 10/21/17 0818    donepezil (ARICEPT) tablet 10 mg  10 mg Oral Nightly Yannick Carrier, MD   10 mg at 10/21/17 2147    fish oil capsule 3,000 mg  3,000 mg Oral Nightly Yannick Carrier, MD   3,000 mg at 10/21/17 2149    gentamicin (GARAMYCIN) 0.3 % ophthalmic solution 1 drop  1 drop Both Eyes TID Yannick Carrier MD   1 drop at 10/21/17 2152    ipratropium-albuterol (DUONEB) nebulizer solution 3 mL  1 vial Inhalation 4x Daily Yannick Carrier MD   3 mL at 10/21/17 2039    latanoprost (XALATAN) 0.005 % ophthalmic solution 1 drop  1 drop Both Eyes Nightly Yannick Carrier MD   1 drop at 10/21/17 2154    memantine (NAMENDA) tablet 5 mg  5 mg Oral BID Yannick Carrier, MD   5 mg at 10/21/17 2154    mirtazapine (REMERON) tablet 30 mg  30 mg Oral Nightly Yannick Carrier, MD   30 mg at 10/21/17 2155    mometasone-formoterol (DULERA) 200-5 MCG/ACT inhaler 2 puff  2 puff Inhalation QAM Yannick Carrier MD   2 puff at 10/21/17 0558    nitroGLYCERIN (NITROSTAT) SL tablet 0.4 mg  0.4 mg Sublingual Q5 Min PRN Yannick Carrier MD        pantoprazole (PROTONIX) tablet 40 mg  40 mg Oral QAM AC Yannick Carrier, MD   40 mg at 10/21/17 0604    potassium chloride (MICRO-K) extended release capsule 20 mEq  20 mEq Oral BID Yannick Carrier, MD   20 mEq at 10/21/17 2156    sertraline (ZOLOFT) tablet 50 mg  50 mg Oral Daily Yannick Carrier, MD   50 mg at 10/21/17 5830    simvastatin (ZOCOR) tablet 40 mg  40 mg Oral Nightly Yannick Carrier, MD   40 mg at 10/21/17 2156       Allergies   Allergen Reactions    Adhesive Tape Rash     blistering    Brimonidine     Niacin And Related     Promethazine Hcl Other (See Comments)    Codeine Anxiety and Other (See Comments)     nightmares    Phenergan [Promethazine] Anxiety     Nightmares and hallucinations    Prednisone Anxiety sounds normal.   Abdominal: Soft. Bowel sounds are normal.   Musculoskeletal: Normal range of motion. Neurological: He is alert and oriented to person, place, and time. Skin: Skin is warm and dry. Psychiatric: He has a normal mood and affect. Ortho Exam  Neurologic Exam     Mental Status   Oriented to person, place, and time. Cranial Nerves     CN III, IV, VI   Pupils are equal, round, and reactive to light. After extensive review of the records and above physical exam, I have formulated the following diagnoses and plan:      DIAGNOSES:      1. The patient was admitted to the acute rehabilitation unit with the primary rehab diagnoses being severe abnormality of gait and mobility and impaired self care and ADL's due to [facility] with loss of balance    Compared to Pre-Admission Assessment, patients medical and functional status remain challengingly complex and patient continues to require intensive therapeutic intervention from multiple therapies, therefore, initiate acute intensive comprehensive inpatient rehabilitation program including PT/OT to improve balance, ambulation, ADLs, and to improve the P/AROM. Functional and medical status reassessed regarding patients ability to participate in therapies and patient found to be able to participate in acute intensive comprehensive inpatient rehabilitation program.  Therapeutic modifications regarding activities in therapies, place, amount of time per day and intensity of therapy made daily. Enroll in acute course of therapy program to include 1 1/2 hours per day of PT 5 to 7 days per week and 1 1/2 hours per day of OT 5 to 7 days per week, and  ST 1/2 hour per day 3-5 days per week SLP .   The patient is stable medically and physically on previous exam.       This patient present with significant new onset decreased mobility and inability to perform activities of daily living skills independently and is at significant risk for prolonged disability for medical status. This cannot be sufficiently provided by home health care, a skilled nursing facility or in an outpatient setting. I further feel that the patient has the potential to improve functional abilities in an acute intensive rehabilitation program.    Old records were reviewed and summarized. 2.  Other diagnoses which complicate rehabilitation stay include: Active Problems:    * No active hospital problems. *    Patient Active Problem List   Diagnosis    CAD (coronary artery disease) s/p ALIYAH to mid Circ. 3/2012    HTN (hypertension)    Depression    Dyslipidemia    Dementia    Alzheimer disease    Macular degeneration    Pneumonia    Acid reflux    Glaucoma    Chronic obstructive pulmonary disease with acute exacerbation (HCC)    Obstructive sleep apnea syndrome    Metabolic encephalopathy    Gait abnormality    Basal cell papilloma    Basal cell carcinoma of lip    COPD (chronic obstructive pulmonary disease) (HCC)    Abnormality of gait and mobility due to closed head injury s/p a fall, [facility] admit 10/20/17    Closed head injury         I am especially concerned about CHI s/p fall with poor balance    The patient's high risk medication use includes ASA Lovenox. The patient is high risk for urinary tract infection, an admission urinalysis has been ordered. I will have the nurses check post void residual bladder volumes and place a catheter if excessive urine is retained in the bladder after voiding. The patient is risk for deep venous thromosis,complex deep venous thrombosis protocol prophylaxis has been ordered Lovenox ASA. The patient is high risk for orthostasis and a hydration program and orthostatic blood pressure screening have been ordered. I will attempt to get old records from the patient's previous hospital stay. Care everywhere on "[employer] was utilized.     3.  Current and previous medications were reviewed and summarized and compared to old medication lists from home and from the acute floor. 4.  Complex labs and x-rays were reviewed. I will review patient's old EKG and place it on the chart. 5.  Will provide emotional support for this patient regarding adjustment to their disability. Cognition and mood will be screened daily and addressed by rehabilitation psychology and/or speech therapy as appropriate. I have encouraged the patient to attend the Rehab Adjustment to Disability Support Group and recreational therapy. 6.  Estimated length of stay is 2 weeks. Discharge to home with help from family and home health SLP PT, OT, RN, aide and . Patient should be independent at discharge. 7.  The patient's medical and rehab prognosis are good. [address] regarding the patient's back up to general medical needs. A welcome letter was presented with an explanation of my services, my specialty and what to expect during the rehabilitation process. As well as introducing myself, I also wrote my name on their bedside marker board with their name as well as the names of the other physicians with an explanation of our individual roles in their care, as well as the rehab process.       Tommy Curran D.O., F.A.A.P.M.&SETH.

## 2017-10-22 NOTE — PROGRESS NOTES
SPEECH/LANGUAGE PATHOLOGY  BEDSIDE SWALLOWING EVALUATION    PATIENT NAME:  Huma Soto. :  1934      TODAY'S DATE:  10/22/2017  ROOM: [place] [id number]    Time in: 0935    Time out: 0950      [x]The admitting diagnosis and active problem list, as listed below have been reviewed prior to initiation of this evaluation. Closed injury of head, initial encounter [S09.90XA]  CHI (closed head injury), initial encounter [S09.90XA]  Patient Active Problem List   Diagnosis    CAD (coronary artery disease) s/p ALIYAH to mid Circ. 3/2012    HTN (hypertension)    Depression    Dyslipidemia    Dementia    Alzheimer disease    Macular degeneration    Pneumonia    Acid reflux    Glaucoma    Chronic obstructive pulmonary disease with acute exacerbation (HCC)    Obstructive sleep apnea syndrome    Metabolic encephalopathy    Gait abnormality    Basal cell papilloma    Basal cell carcinoma of lip    COPD (chronic obstructive pulmonary disease) (HCC)    Abnormality of gait and mobility due to closed head injury s/p a fall, [facility] admit 10/20/17    Closed head injury     Allergies   Allergen Reactions    Adhesive Tape Rash     blistering    Brimonidine     Niacin And Related     Promethazine Hcl Other (See Comments)    Codeine Anxiety and Other (See Comments)     nightmares    Phenergan [Promethazine] Anxiety     Nightmares and hallucinations    Prednisone Anxiety           DYSPHAGIA DIAGNOSIS:   Mild oral phase dysphagia with suspected pharyngeal phase dysphagia. Rec: MBS to more objectively assess swallowing function and anatomy.      DIET RECOMMENDATIONS: soft consistencies (NDD level III) with thin liquids    FEEDING RECOMMENDATIONS:    []No assistance required   []Stand by assist    []Full assistance required  [x]Set up required    []Supervision with all PO intake [x]Up at 90     []Double swallow    []Chin tuck   []Multiple swallow     []Small bites/sips      []Alternate solids / liquids  []Check for oral pocketing   []No straw    []Throat clear       []Spoon sip liquids   []Effortful swallow       THERAPY RECOMMENDATIONS:   []  Therapy is not recommended     [x]  Therapy is recommended to:    []  Improve oral motor strength and range of motion    []  Improve tongue base retraction     []  Improve laryngeal strength and range of motion     []  Mealtime assessment of patient's tolerance of prescribed diet     []  Repeat bedside swallow study in    [x]  Modified Barium Swallow (MBS) is recommended and requires a Physician order    [x]  David Mayer RN notified   [x]  Dr. Niranjan Hirsch notified via voicemail  [x]  OT/PT Departments notified via [address] [place] [address] :    DIET CARDIAC;   Dietary Nutrition Supplements: Frozen Oral Supplement  Dietary Nutrition Supplements: Standard High Calorie Oral Supplement    CURRENT RESPIRATORY STATUS:   []Room Air  [x]Nasal Canula []O2  Mask  []Non Re-Breather    []Ventilator  []BiPAP  []Tracheostomy with Room Air []Tracheostomy with O2        CURRENT COGNITIVE STATUS:   [x] Alert    [x]Responsive       []Non-responsive   [x]Confused  [] Cooperative     []Uncooperative    []Aphasic       []Impulsive              PROCEDURE   Consistencies Administered During the Evaluation   Liquids: [x]  Thin    [x]  Nectar   [x]  Honey  Solids:  [x]  Pureed/Pudding   []  Soft Solid   [x]  Solid     []Moistened [place]te   []Other:      Method of Intake:   [x]  Cup    []  Spoon  []  Straw    []  Self Fed    [x]Set-Up     []  Fed by Clinician     Position:   [x]  Upright seated ([]In bed [x] in chair)   []  Reclined upright in bed                     ORAL MECHANISM EXAMINATION  [x] Adequate lingual/labial strength  [] Generalized oral weakness  [] Left labiobuccal weakness   [] Right labiobuccal weakness  [] Left lingual deviation   [] Right lingual deviation  [] Oral apraxia    [] CNT  [] DNT    RESULTS   Oral Stage:   []  WNL []  WFL [x] Exceptions     [x]  Dentition: []  natural  []  missing teeth  []  edentulous  []  partials  [x] dentures     []  Inadequate labial seal resulting anterior labial spillage from:   [] right  [] left  [] midline     [x]  Decreased mastication due to:     []  poor/missing dentition  []  decreased lingual control  [x]  vertical munch    []  cognitive function    []  Delayed A-P transit due to []  decreased initiation   [] reduced lingual strength         []  cognitive function     []  Oral residuals []  right buccal cavity  []  left buccal cavity []  sub lingually   []  on tongue base   []  throughout oral cavity     []  on superior tongue       []  on palate               []  on velum              []  anterior sulcus    Oral Stage Impression:   Mild oral phase dysphagia characterized by anterior vertical munch pattern resulting in occasional tongue thrusting and somewhat slower mastication time. Please note, however, that pt was able to form a clear a cohesive bolus independently. Suspect decreased tongue base retraction to the posterior pharyngeal wall. Rec: MBS to more objectively assess swallowing function and anatomy and ascertain whether pharyngeal residuals are present s/p swallow.      Pharyngeal Stage:  []  WNL []  WFL      [x]  Exceptions      [x]  Throat clearing present after presentation of:    [x]  thin  [x]  nectar  [x]  honey    []  pureed  []  solid    []  Immediate wet cough was noted after presentation of:    []  thin  []  nectar  []  honey   []  pureed  []  Solid    []  Latent wet cough was noted after presentation of :    []  thin  []  nectar  []  Honey  []  pureed  []  solid    []  Wet respirations were noted after presentation of:    []  thin  []  nectar  []  honey   []  pureed  []  solid    [x]  INTERMITTENT Wet/gurgly vocal quality was noted after presentation of:    [x]  thin  [x]  nectar  [x] honey   []  pureed  []  solid    [] Multiple swallows were noted after presentation of:    []  thin []  nectar  []  Honey  []  pureed  [] solid    []  Consistent O2  desaturation after the swallow  []  Eye watering/flushing of skin  []  Congested cough throughout evaluation  [] Delayed initiation of the pharyngeal swallow noted  []  Absent swallow    Pharyngeal Stage Impression:  Suspect pharyngeal phase dysphagia secondary to intermittent wet/gurgly vocal quality following all liquid trials. No significant change was noted per thin vs thickened liquids for vocal quality. ST rec: MBS to more objectively assess swallowing function and anatomy. Pt does have RLL pneumonia. Long Term Goals:  [] Will tolerate least restrictive diet safely      [x]Goals to be determined after MBS  `    [] No Treatment indicated at this time  Plan of Care:    [] Oral/motor exercises   [] Adduction/voice/pitch exercises   [] Dysphagia exercies    [] Mastication exercises   [] Therapeutic meal/monitoring/feeding  [x] MBS Recommended        [x]  Prognosis for improvements is Good, motivation    Pain:none, N/A   []Nursing notified    Safety Devices:  [x] Call light within reach [x] Chair alarm activated  [] Bed alarm activated    Patient Education:  Treatment goals discussed with [x]  Patient  []  family. The []  Patient  []  family understand the diagnosis, prognosis and plan of care.                  [x] Reinforcement needed    Mc Moreno 10/22/17 9:57 AM

## 2017-10-22 NOTE — PROGRESS NOTES
Speech Language Pathology   ASSESSMENT OF SPEECH, LANGUAGE, & COGNITION    R241/R241-01  Celina Grady.  1934    [x] The admitting diagnosis and active problem list as listed below have been reviewed prior to the initiation of this evaluation. Closed injury of head, initial encounter [S09.90XA]  CHI (closed head injury), initial encounter [S09.90XA]  Patient Active Problem List   Diagnosis    CAD (coronary artery disease) s/p ALIYAH to mid Circ. 3/2012    HTN (hypertension)    Depression    Dyslipidemia    Dementia    Alzheimer disease    Macular degeneration    Pneumonia    Acid reflux    Glaucoma    Chronic obstructive pulmonary disease with acute exacerbation (HCC)    Obstructive sleep apnea syndrome    Metabolic encephalopathy    Gait abnormality    Basal cell papilloma    Basal cell carcinoma of lip    COPD (chronic obstructive pulmonary disease) (HCC)    Abnormality of gait and mobility due to closed head injury s/p a fall, [facility] admit 10/20/17    Closed head injury       Time in: 920  Time out: 935        OBJECTIVE ASSESSMENT:    Mental status:   [x] Alert           [x]Responsive            []Cooperative            [] Uncooperative                 []Aphasic                    [x]  Confused              []Impulsive              []Unresponsive            [x] Glasses      []Hearing Aides    Cognition:    Cog/Linguistic Skills  [facility] Impaired  Not Tested    Orientation: Person  +Name  - and age       Place/Time/Date  Pt stated he was at [facility] in [place]. He stated it was 2077            Attention Sustained  x     Divided  x           Problem Solving Simple  x     Complex  x       Sequencing  x           Memory Short Term  x     Long Term  X Unable to recall his addess. He stated that this is new \"since the fall\".             Calculations Simple x      Complex x          Receptive Language:    Comprehension    WFL Impaired N/T    Yes/no   questions []CNT  Resonance:  [x]WFL []Hypernasal  []Hyponasal  []Nasal emission []CNT  Quality:   [x]WFL []Hoarse []Harsh []Strained [] Breathiness []CNT  Pitch:    [x]WFL []High []Low []CNT  Intensity: [x]WFL []Loud []Quiet []CNT  Fluency:  [x]Intact []Dysfluent []CNT  Prosody [x]Intact []Monotone []Irregular fluctuation      SALIENT CLINICAL OBSERVATIONS  [x] Latent processing  [] Latent responses  [] Inconsistent responses   [] Perseveration   [x] Cueing necessary for accurate completion of task        DISCHARGE PLANNING:  Given current cognitive/linguistic status, SLP recommends:   [x]24 Hour Supervision   [] Close Supervision     []Intermittent Supervision   [] No Supervision    [x]  Patient stated goals: \"To get better\"          Education was completed:  Speech Pathologist (SLP) completed education with the patient and/or family regarding identified cognitive linguistic deficits and subsequent need for speech pathology intervention. Discussed deficit areas to be targeted by formal intervention and established short/long term goals. Reviewed compensatory strategies to improve functional outcome (as appropriate). Encouraged patient and/or family to engage SLP in structured Q&A session relative to identified deficit areas. Patient and/or family indicated understanding of all information provided via satisfactory verbal response. [x] Patient was an active participant in goal planning process. [] Patient was unable to participate in goal planning process. [] Goal planning not appropriate at this time as intervention was not recommended. SPEECH PATHOLOGY DIAGNOSIS:    Pt presents with severe cognitive deficits characterized by decreased attention, orientation, problem solving, STM, LTM, and executive functioning. Pt also presents with mild-moderate language deficits characterized by decreased comprehension of 2+ directions and decreased word finding at the conversational level.  The aforementioned deficits negatively affect pt's level of safety and independence. THERAPY RECOMMENDATIONS:  Impairments result in the decreased ability for: [x]ADL's    [] Work   [] School   [x] Home       Treatment Plan:Treatment Plan:   Pt to be seen 2-3x/wk for 1-3weeks  Speech Pathology intervention is recommended with emphasis on the following:  Cognitive-Communicative skills        Long Term Goals:  1. Pt will improve Language Abilities to a Supervision level for effective communication with familiar and unfamiliar communication partners. 2. Pt will improve Cognition to a Mod Assist level for ADLs. Short Term Goals:   1. Pt will be alert and oriented x 4, with use of environmental referencing and verbal cues as needed. 2. Pt will utilize compensatory strategies as trained for memory deficits in 4/5 given opportunities during structured tasks with SLP in order to promote independence in the presence of cognitive deficits. 3. Pt will complete long term memory tasks with 70% accuracy with moderate verbal cues. 4. Pt will follow 2-3 step directions with 75% accuracy with min cues to promote comprehension. 5. Pt will name 5/5 items in a concrete category with mild verbal cues to promote semantic organization. [x]  Prognosis for improvements is Good, motivation    Pain:none, N/A   []Nursing notified    Safety Devices:  [x] Call light within reach [x] Chair alarm activated  [] Bed alarm activated    Patient Education:  Treatment goals discussed with [x]  Patient  []  family. The []  Patient  []  family understand the diagnosis, prognosis and plan of care.                  [x] Reinforcement needed      FIM:  Comprehension          Expression   []7 - Independent   []7 - Indpendent   []6 - Modified Independent  []6 - Modified Independent   []5 - Supervision   []5 - Supervision   []4 - Min Assist   [x]4 - Min Assist   [x]3 - Mod Assist   []3 - Mod Assist   []2 - Max Assist   []2 - Max Assist   []1 - Dependent   []1 -

## 2017-10-23 NOTE — DISCHARGE SUMMARY
Med      hydrocortisone 2.5 % cream Apply topically 2 times daily Apply topically 2 times daily. , Topical, 2 TIMES DAILY, Until Discontinued, Historical Med      ipratropium-albuterol (DUONEB) 0.5-2.5 (3) MG/3ML SOLN nebulizer solution Inhale 1 vial into the lungs 4 times dailyHistorical Med      latanoprost (XALATAN) 0.005 % ophthalmic solution Place 1 drop into both eyes nightlyHistorical Med      memantine (NAMENDA) 5 MG tablet Take 5 mg by mouth 2 times dailyHistorical Med      mirtazapine (REMERON) 15 MG tablet Take 30 mg by mouth nightlyHistorical Med      nitroGLYCERIN (NITROSTAT) 0.4 MG SL tablet Place 0.4 mg under the tongue every 5 minutes as needed for Chest pain up to max of 3 total doses. If no relief after 1 dose, call 911. Historical Med      Multiple Vitamins-Minerals (OCUVITE-LUTEIN) TABS oral tablet Take 1 tablet by mouth 2 times dailyHistorical Med      omeprazole (PRILOSEC) 20 MG delayed release capsule Take 20 mg by mouth dailyHistorical Med      potassium chloride (MICRO-K) 10 MEQ extended release capsule Take 20 mEq by mouth 2 times dailyHistorical Med      sertraline (ZOLOFT) 50 MG tablet Take 50 mg by mouth dailyHistorical Med      simvastatin (ZOCOR) 40 MG tablet Take 40 mg by mouth nightlyHistorical Med           Activity: activity as tolerated  Diet: regular diet  Wound Care: none needed    Follow-up with Rossi Michael DO   in 1 week. Signed:   Chey Jacob    10/23/2017  12:57 PM

## 2017-10-23 NOTE — PROGRESS NOTES
problems. Rehabilitation:  Physical therapy: FIMS:  Bed Mobility:      Transfers: Sit to Stand: Contact guard assistance  Stand to sit: Contact guard assistance, Ambulation 1  Surface: carpet  Device: Rolling Walker  Other Apparatus: O2  Assistance: Contact guard assistance, Minimal assistance  Quality of Gait: flexed trunk, wbos, bilateral knee instability, decreased heel strike iglesia  Distance: 100 feet  Comments: tolerated longer distance this pm but was fatigued,      FIMS: Bed, Chair, Wheel Chair: 5 - Requires setup/supervision/cues  Walk: 1 - Total Assistance Walks/operates wheelchair < 50 feet OR requires two or more people OR patient performs < 25% of locomotion effort  Distance Walked: 5 feet  Stairs: 0 - Activity Does not Occur ( 0 only for the admission assessment),  , Assessment: Pt appears to be near functional baseline however reports not as strong as usual and more fatigued. Pt with strength deficits and decreased safety awareness with use of AD requiring consistent verbal cues.     Occupational therapy: FIMS:  Eatin - Feeds self with setup/supervision/cues and/or requires only setup/supervision/cues to perform tube feedings  Groomin - Requires setup/cues to do all tasks  Bathin - Able to bathe all 10 areas with setup/sup/cues  Dressing-Upper: 5 - Requires setup/supervision/cues and/or requires assist with presthesis/brace only  Dressing-Lower: 5 - Requires setup/supervision/cues and/or staff applies TEDS/prosthesis/brace only  Toiletin - Requires setup/supervision/cues  Toilet Transfer: 5 - Requires setup/supervision/cues  Tub Transfer: 0 - Activity does not occur  Shower Transfer: 5 - Supervision, set-up, cues,  ,      Speech therapy: FIMS: Comprehension: 4 - Patient understands basic needs 75-90%+ of the time  Expression: 5 - Expresses basic ideas/needs only (hungry/hot/pain)  Social Interaction: 7 - Patient has appropriate behavior/relations 100% of the time  Problem Solvin -

## 2017-10-23 NOTE — PLAN OF CARE
Problem: Musculor/Skeletal Functional Status  Goal: Absence of falls  Outcome: Ongoing  Continue plan of care. Problem: Falls - Risk of  Goal: Absence of falls  Outcome: Ongoing  Continue plan of care.

## 2017-10-23 NOTE — PROGRESS NOTES
Pee Paulson is a 80 y.o. male patient. Admitted to rehab due to falls, gait abnormality, closed head injury. We've been consulted to address his medical issues and resume home medications.     Current Facility-Administered Medications   Medication Dose Route Frequency Provider Last Rate Last Dose    QUEtiapine (SEROQUEL) tablet 12.5 mg  12.5 mg Oral Nightly PRN Celina Ivy MD   12.5 mg at 10/22/17 2229    acetaminophen (TYLENOL) tablet 650 mg  650 mg Oral Q4H PRN Jasvir Meeks MD        docusate sodium (COLACE) capsule 100 mg  100 mg Oral BID Jasvir Meeks MD   100 mg at 10/22/17 2219    enoxaparin (LOVENOX) injection 40 mg  40 mg Subcutaneous Daily Jasvir Meeks MD   40 mg at 10/22/17 2221    magnesium hydroxide (MILK OF MAGNESIA) 400 MG/5ML suspension 30 mL  30 mL Oral Daily PRN Jasvir Meeks MD        albuterol (PROVENTIL) nebulizer solution 2.5 mg  2.5 mg Nebulization Q2H PRN Jasvir Meeks MD        allopurinol (ZYLOPRIM) tablet 100 mg  100 mg Oral Daily Jasvir Meeks MD   100 mg at 10/22/17 0940    amoxicillin-clavulanate (AUGMENTIN) 875-125 MG per tablet 1 tablet  1 tablet Oral BID Jasvir Meeks MD   1 tablet at 10/22/17 2218    antioxidant multivitamin (OCUVITE) tablet  1 tablet Oral BID Jasvir Meeks MD   1 tablet at 10/22/17 2218    artificial tears ophthalmic solution 1 drop  1 drop Both Eyes 4x Daily PRN Jasivr Meeks MD        aspirin chewable tablet 81 mg  81 mg Oral Daily Jasvir Meeks MD   81 mg at 10/22/17 4279    atenolol (TENORMIN) tablet 25 mg  25 mg Oral Daily Jasvir Meeks MD   25 mg at 10/22/17 0940    donepezil (ARICEPT) tablet 10 mg  10 mg Oral Nightly Jasvir Meeks MD   10 mg at 10/22/17 2220    fish oil capsule 3,000 mg  3,000 mg Oral Nightly Jasvir Meeks MD   3,000 mg at 10/22/17 2223    ipratropium-albuterol (DUONEB) nebulizer solution 3 mL  1 vial Inhalation 4x Daily Jasvir Meeks MD   3 mL at 10/23/17 1344

## 2017-10-23 NOTE — PROGRESS NOTES
Nutrition Assessment    Type and Reason for Visit: Initial, Positive Nutrition Screen ('chewing/swallowing')    Nutrition Recommendations: Continue current diet, Modify current ONS (discontinue standard high calorie ONS, continue frozen ONS BID)    Malnutrition Assessment:  · Malnutrition Status: At risk for malnutrition  · Context: Chronic illness  · Findings of the 6 clinical characteristics of malnutrition (Minimum of 2 out of 6 clinical characteristics is required to make the diagnosis of moderate or severe Protein Calorie Malnutrition based on AND/ASPEN Guidelines):  1. Energy Intake-Less than or equal to 50%, greater than or equal to 1 month (pta)    2. Weight Loss-5% loss or greater, in 1 month  3. Fat Loss-No significant subcutaneous fat loss,    4. Muscle Loss-No significant muscle mass loss,    5. Fluid Accumulation-No significant fluid accumulation,    6.  Strength-Normal    Nutrition Diagnosis:   · Problem: Inadequate oral intake  · Etiology: related to  (current condition)     Signs and symptoms:  as evidenced by Weight loss greater than or equal to 5% in 1 month, Diet history of poor intake (pta)    Nutrition Assessment:  · Subjective Assessment: pt admitted to rehab from . Information obtained from initial nutrition assessment (10/19). He states his appetite/po intake is improving. Does not recall recieving ONS on trays. · Nutrition-Focused Physical Findings: No edema noted.   BM (10/22)  · Wound Type: None  · Current Nutrition Therapies:  · Oral Diet Orders: Cardiac, Dysphagia 3   · Oral Diet intake: 51-75%  · Oral Nutrition Supplement (ONS) Orders: Frozen Oral Supplement, Standard High Calorie Oral Supplement  · ONS intake: 0%  · Anthropometric Measures:  · Ht: 6' (182.9 cm)   · Current Body Wt: 190 lb (86.2 kg)  · Admission Body Wt: 190 lb (86.2 kg)  · Usual Body Wt: 200 lb (90.7 kg) (stated per wife, ~ 1 month ago)  · % Weight Change: 5%,  ~ 1 month  · Ideal Body Wt: 178 lb (80.7 kg), % Ideal Body 107%  · BMI Classification: BMI 25.0 - 29.9 Overweight  · Comparative Standards (Estimated Nutrition Needs):  · Estimated Daily Total Kcal: 5640-0859  · Estimated Daily Protein (g): 81-97    Estimated Intake vs Estimated Needs: Intake Improving    Nutrition Risk Level: Moderate    Nutrition Interventions:   Continue current diet, Modify current ONS (discontinue standard high calorie ONS, continue frozen ONS BID)  Continued Inpatient Monitoring, Education not appropriate at this time    Nutrition Evaluation:   · Evaluation: Goals set   · Goals: po intake > 75% of meals and supplements. Stable weight    · Monitoring: Meal Intake, Supplement Intake, Weight, Pertinent Labs    See Adult Nutrition Doc Flowsheet for more detail.      Electronically signed by Rekha Brantley RD, LD on 10/23/17 at 3:07 PM

## 2017-10-23 NOTE — PROGRESS NOTES
Physical Therapy  Facility/Department: [facility]  Rehabilitation Daily Treatment Note    NAME: Pee Paulson : 1934 (80 y.o.)  MRN: [id number]    Account: [de-identified]  Gender: male    Date of Service: 10/23/17      SUBJECTIVE: No new reports from pt. Start of tx pain 0/10    End of tx pain 0/10    OBJECTIVE:    Bed Mobility:   Rolling: Stand by Assist  Supine to sit: Contact Guard Assist  Sit to Supine: [address] time to complete    Transfers:   Sit to stand: Minimal Assist  Stand to sit: Contact Guard Assist  Chair to/from bed: Contact Guard Assist  Car Transfer: Not tested   VC for hand placement with STS      Gait/Ambulation:   Assistive Device: Rolling Walker  Assist Level: Contact Guard Assist  Distance: 100 feet  Surface: carpet, linoleum/hardwood  Gait Deviations: Forward flexed with VC for improved posture, decreased step length with VC to inc step length and for improved posture with good carryover    Stair Negotiation:      4 -6\" x1     Rails: b/l     Assist: Contact Guard Assist     Non - reciprocal    PLAN:   Continue per POC      Comments:   Good tolerance to tx this date. Not reports of inc pain with activities. Inc effort needed for BM. Falls Safety Armband Present: [x] Yes  [] No    Safety/Judgment:  VC for safe technique with approach to chair               Minutes: 30 min      Transfer/Bed mobility trainin      Gait trainin      Neuro re education:      Therapeutic ex:          Therapy Time:    Individual   Time In 1100   Time Out 1130   Minutes 30       Electronically signed by Yordan Patrick PTA on 10/23/2017 at 11:33 AM

## 2017-10-23 NOTE — PLAN OF CARE
Problem: Nutrition  Goal: Optimal nutrition therapy  Outcome: Ongoing  Nutrition Problem: Inadequate oral intake  Intervention: Food and/or Nutrient Delivery: Continue current diet, Modify current ONS (discontinue standard high calorie ONS, continue frozen ONS BID)  Nutritional Goals: po intake > 75% of meals and supplements.   Stable weight

## 2017-10-23 NOTE — PROCEDURES
PHARYNGEAL RESIDUALS      Vallecula/Pharyngeal Wall  [x]No significant residuals were noted in the vallecula    []Reduced tongue base retraction resulting in:    []residuals in vallecula []residual on posterior pharyngeal wall    Pyriform Sinuses  [x]No significant residuals were noted in the pyriform sinuses    [] Residuals in the pyriform sinuses were noted due to:    []pharyngeal weakness []cricopharyngeal dysfunction      LARYNGEAL PENETRATION/ASPIRATION  [x]Laryngeal penetration was not present during this evaluation    [x]Aspiration was not present during this evaluation        Aspiration Scale  Puree  Solid  Thin 1 Material does not enter the airway    2 Material enters the airway, remains above the vocal folds, and is ejected from the airway    3 Material enters the airway, remains above the vocal folds, and is not ejected from the airway    4 Material enters the airway, contacts the vocal folds, an is ejected from the airway    5 Material enters the airway, contacts the vocal folds, and is not ejected from the airway    6 Material enters the airway, passes below the vocal folds and is ejected into the larynx or out of the airway    7 Material enters the airway, passes below the vocal folds, and is not ejected from the trachea despite effort    8 Material enters the airway, passes below the vocal folds, and no effort is made to eject. Pharyngeal Stage Impression:  Pharyngeal stage WFL. Functional laryngeal elevation with no penetration or aspiration. STRUCTURAL/FUNCTIONAL ANOMALIES    [x]No structural/functional anomalies were noted    []Inadequate velopharyngeal closure resulting in nasopharyngeal reflux.      [] There was presence of Zenkers Diverticulum per Radiologist     []Comments:       CERVICAL ESOPHAGEAL STAGE : [x]Adequate []Inadequate  []Not Assessed     []Cervical osteophytes present per Radiologist     []Structural/mechanical abnormality in cervical esophagus per Radiologist    [] Redirection of bolus from the esophagus into pharynx     Long Term Goals:  [x] Will tolerate least restrictive diet safely      []Goals to be determined after MBS      [] No Treatment indicated at this time    Short Term Goals:   Pt to be seen  1 x/week   [] will improve oral/motor/swallow strength/function   [] will use safe swallow strategies with assist   [] will improve pharyngeal swallow strength/function   [x] will tolerate diet level with no s/s of aspiration. Plan of Care:    [] Oral/motor exercises  [] Adduction/voice/pitch exercises   []Dysphagia exercies   [] Mastication exercises   [x] Therapeutic meal/monitoring/feeding    [x]  Prognosis for improvements is: Good,      Pain:none, N/A   []Nursing notified        Radiologist:  Available on Consult as needed, Radiology Tech    Treatment goals were discussed with the: [x] patient  [] family. The [x]  patient []  family understand the diagnosis, prognosis and plan of care. Thank you for your referral.  Please direct any questions or concerns to Speech Pathology. Images are available through [facility]/PACS. Please see radiologist report for additional details. Paul Cruz, 10/23/17, 4:23 PM

## 2017-10-23 NOTE — PROGRESS NOTES
Occupational Therapy  Facility/Department: [facility]  Daily Treatment Note  NAME: Alison Bucio. : 1934  MRN: [id number]    Date of Service: 10/23/2017    Patient Diagnosis(es): There were no encounter diagnoses. has a past medical history of Abnormality of gait and mobility; Acid reflux; Acquired cyst of kidney; Alzheimer disease; Arthritis; Basal cell carcinoma of lip; Basal cell papilloma; CAD (coronary artery disease); Cancer ([facility] Closed head injury; COPD (chronic obstructive pulmonary disease) ([facility] DD (diverticular disease); Dementia; Depression; Diverticulosis of intestine; Dyslipidemia; Gastroesophageal reflux disease; Glaucoma; H/O malignant neoplasm of skin; H/O surgical procedure; Hematuria, microscopic; History of surgical procedure; History of tobacco use; HTN (hypertension); Impaired mobility and activities of daily living; Kidney stone; Macular degeneration; MI (myocardial infarction); Neuromuscular disorder ([facility] NSVT (nonsustained ventricular tachycardia) ([facility] Obstructive apnea; Orthostasis; Pneumonia; and Syncope.   has a past surgical history that includes Upper gastrointestinal endoscopy; Cholecystectomy, laparoscopic (3/13/03); Kidney stone surgery (several); Colonoscopy (10/6/06); Cystoscopy (); Skin cancer excision (06); Lung biopsy (Right, 4/13/10); Coronary angioplasty with stent (3/2012); other surgical history; Appendectomy; skin biopsy; and vascular surgery. Restrictions  Restrictions/Precautions  Restrictions/Precautions: Fall Risk  Subjective   General  Chart Reviewed: Yes  Patient assessed for rehabilitation services?: Yes  Family / Caregiver Present: No  Referring Practitioner: Dr. Rody Penaloza    Diagnosis: Closed head injury s/p fall   Subjective  Subjective: Pt questionable historian.    Pre Treatment Pain Screening  Pain at present: 0  Pain Assessment  Pain Level: 0   Orientation     Objective      ADL  Grooming: Setup  UE Bathing: Setup  LE Bathing: Setup  UE Dressing: Supervision;Verbal cueing  LE Dressing: Supervision;Verbal cueing  Toileting: Supervision (v/c's for hygiene thoroughness following BM)      Toilet Transfers  Toilet - Technique: Ambulating  Equipment Used: Grab bars  Toilet Transfer: Stand by assistance  Shower Transfers  Shower - Transfer From: Fer Dusky - Transfer Type: To and From  Shower - Transfer To: Shower seat with back  Shower - Technique: Ambulating  Shower Transfers: Stand by assistance     Pt noted to have coughed up blood during ADL, Diana RN notified        Assessment      Activity Tolerance  Activity Tolerance: Patient Tolerated treatment well  Safety Devices  Safety Devices in place: Yes  Type of devices: All fall risk precautions in place       Discharge Recommendations:  Continue to assess pending progress     Plan   Plan  Times per week: 5-7x/wk, 15-90 mins  Plan weeks: 2-3  Current Treatment Recommendations: Strengthening, Balance Training, Functional Mobility Training, Endurance Training, Cognitive Reorientation, Self-Care / ADL, Cognitive/Perceptual Training  G-Code     OutComes Score                                             Goals  Patient Goals   Patient goals : To go home.         Therapy Time   Individual Concurrent Group Co-treatment   Time In 0930         Time Out 1030         Minutes 60               Electronically signed by GLADYS Allen on 10/23/2017 at 11:53 AM  GLADYS Allen

## 2017-10-23 NOTE — PROGRESS NOTES
Occupational Therapy  Facility/Department: [facility]  Daily Treatment Note  NAME: Elio Turcios. : 1934  MRN: [id number]    Date of Service: 10/23/2017    Patient Diagnosis(es): There were no encounter diagnoses. has a past medical history of Abnormality of gait and mobility; Acid reflux; Acquired cyst of kidney; Alzheimer disease; Arthritis; Basal cell carcinoma of lip; Basal cell papilloma; CAD (coronary artery disease); Cancer ([facility] Closed head injury; COPD (chronic obstructive pulmonary disease) ([facility] DD (diverticular disease); Dementia; Depression; Diverticulosis of intestine; Dyslipidemia; Gastroesophageal reflux disease; Glaucoma; H/O malignant neoplasm of skin; H/O surgical procedure; Hematuria, microscopic; History of surgical procedure; History of tobacco use; HTN (hypertension); Impaired mobility and activities of daily living; Kidney stone; Macular degeneration; MI (myocardial infarction); [facility]rom[facility] ([facility] NSVT (nonsustained ventricular tachycardia) ([facility] Obstructive apnea; Orthostasis; Pneumonia; and Syncope.   has a past surgical history that includes Upper gastrointestinal endoscopy; Cholecystectomy, laparoscopic (3/13/03); Kidney stone surgery (several); Colonoscopy (10/6/06); Cystoscopy (); Skin cancer excision (06); Lung biopsy (Right, 4/13/10); Coronary angioplasty with stent (3/2012); other surgical history; Appendectomy; skin biopsy; and vascular surgery. Restrictions  Restrictions/Precautions  Restrictions/Precautions: Fall Risk  Subjective   General  Chart Reviewed: Yes  Patient assessed for rehabilitation services?: Yes  Family / Caregiver Present: No  Referring Practitioner: Dr. Akosua House    Diagnosis: Closed head injury s/p fall   Subjective  Subjective: Pt questionable historian.    Pre Treatment Pain Screening  Pain at present: 0  Pain Assessment  Pain Level: 0   Orientation     Objective        Cognition  Cognition Comment: pt able to

## 2017-10-23 NOTE — PROGRESS NOTES
[facility] Occupational Therapy      Date: 10/23/2017  Patient Name: Manuel Jerome. MRN: [id number]  Account: [de-identified]   : 1934  (80 y.o.)  Room: [place] [address]    Attempted OT tx at 3:30 PM, but pt. unavailable 2° to:    [] Hold per nsg request    [] Pt declined     [x] Pt. . off floor for test/procedure. Will attempt again when able.     Electronically signed by GLADYS Hackett on 10/23/2017 at 3:30 PM

## 2017-10-24 NOTE — PROGRESS NOTES
Occupational Therapy  Facility/Department: [facility]  Daily Treatment Note  NAME: Tatiana Swann. : 1934  MRN: [id number]    Date of Service: 10/24/2017    Patient Diagnosis(es): There were no encounter diagnoses. has a past medical history of Abnormality of gait and mobility; Acid reflux; Acquired cyst of kidney; Alzheimer disease; Arthritis; Basal cell carcinoma of lip; Basal cell papilloma; CAD (coronary artery disease); Cancer ([facility] Closed head injury; COPD (chronic obstructive pulmonary disease) ([facility] DD (diverticular disease); Dementia; Depression; Diverticulosis of intestine; Dyslipidemia; Gastroesophageal reflux disease; Glaucoma; H/O malignant neoplasm of skin; H/O surgical procedure; Hematuria, microscopic; History of surgical procedure; History of tobacco use; HTN (hypertension); Impaired mobility and activities of daily living; Kidney stone; Macular degeneration; MI (myocardial infarction); Neuromuscular disorder ([facility] NSVT (nonsustained ventricular tachycardia) ([facility].); Obstructive apnea; Orthostasis; Pneumonia; and Syncope.   has a past surgical history that includes Upper gastrointestinal endoscopy; Cholecystectomy, laparoscopic (3/13/03); Kidney stone surgery (several); Colonoscopy (10/6/06); Cystoscopy (); Skin cancer excision (06); Lung biopsy (Right, 4/13/10); Coronary angioplasty with stent (3/2012); other surgical history; Appendectomy; skin biopsy; and vascular surgery. Restrictions  Restrictions/Precautions  Restrictions/Precautions: Fall Risk  Subjective   General  Chart Reviewed: Yes  Patient assessed for rehabilitation services?: Yes  Family / Caregiver Present: No  Referring Practitioner: Dr. Rohith Zuniga    Diagnosis: Closed head injury s/p fall   Subjective  Subjective: Pt questionable historian.    Pre Treatment Pain Screening  Pain at present: 0  Pain Assessment  Pain Level: 0   Orientation     Objective         Coordination  Fine Motor: pt MAX difficulty picking pennies up from table, MAX difficulty stacking pennies in 10's, unable to place stack of pennies in palm and insert into slotted container with in hand manipultion, unable to roll blue theraputty in 'snake' form 2° decreased comprehension, able to place 15 small beads into putty with MIN difficulty and able to remove 15 small beads from theraputty with MOD difficulty. Cognition  Cognition Comment: pt req'ed MAX difficulty to assemble [place] puzzle. pt unable to locate capitol on board when given name of capitol. therapist pointed to correct spot on board where state belonged 95% time for pt to complete puzzle. Pt req'ed v/c's to turn pcs into correct position for placement. Assessment      Activity Tolerance  Activity Tolerance: Patient Tolerated treatment well  Activity Tolerance: 3L O2  Safety Devices  Safety Devices in place: Yes  Type of devices: All fall risk precautions in place       Discharge Recommendations:  Continue to assess pending progress     Plan   Plan  Times per week: 5-7x/wk, 15-90 mins  Plan weeks: 2-3  Current Treatment Recommendations: Strengthening, Balance Training, Functional Mobility Training, Endurance Training, Cognitive Reorientation, Self-Care / ADL, Cognitive/Perceptual Training  G-Code     OutComes Score                                             Goals  Patient Goals   Patient goals : To go home.         Therapy Time   Individual Concurrent Group Co-treatment   Time In 1500         Time Out 1600         Minutes 60               Electronically signed by Tomas GRAHAM on 10/24/2017 at 4:13 PM  GLADYS Jung

## 2017-10-24 NOTE — PROGRESS NOTES
Occupational Therapy  Facility/Department: [facility]  Daily Treatment Note  NAME: Clive Lambert : 1934  MRN: [id number]    Date of Service: 10/24/2017    Patient Diagnosis(es): There were no encounter diagnoses. has a past medical history of Abnormality of gait and mobility; Acid reflux; Acquired cyst of kidney; Alzheimer disease; Arthritis; Basal cell carcinoma of lip; Basal cell papilloma; CAD (coronary artery disease); Cancer ([facility] Closed head injury; [facility] (chronic obstructive [facility] disease) ([facility] DD (diverticular disease); Dementia; Depression; Diverticulosis of intestine; Dyslipidemia; Gastroesophageal reflux disease; Glaucoma; H/O malignant neoplasm of skin; H/O surgical procedure; Hematuria, microscopic; History of surgical procedure; History of tobacco use; HTN (hypertension); Impaired mobility and activities of daily living; Kidney stone; Macular degeneration; MI (myocardial infarction); Neuromuscular disorder ([facility] NSVT (nonsustained ventricular tachycardia) ([facility] Obstructive apnea; Orthostasis; Pneumonia; and Syncope.   has a past surgical history that includes Upper gastrointestinal endoscopy; Cholecystectomy, laparoscopic (3/13/03); Kidney stone surgery (several); Colonoscopy (10/6/06); Cystoscopy (); Skin cancer excision (06); Lung biopsy (Right, 4/13/10); Coronary angioplasty with stent (3/2012); other surgical history; Appendectomy; skin biopsy; and vascular surgery. Restrictions  Restrictions/Precautions  Restrictions/Precautions: Fall Risk  Subjective   General  Chart Reviewed: Yes  Patient assessed for rehabilitation services?: Yes  Family / Caregiver Present: No  Referring Practitioner: Dr. Josué Humphries    Diagnosis: Closed head injury s/p fall   Subjective  Subjective: Pt questionable historian.    Pre Treatment Pain Screening  Pain at present: 0  Pain Assessment  Pain Level: 0   Orientation     Objective      Pt missed 5 minutes Tx 2° therapist late from prior ADL. ADL  Grooming: Setup  Toileting: Supervision      Balance  Standing Balance: Stand by assistance  Standing Balance  Time: able to stand up to 3 min 30 sec with 0-1 UE support X multiple trials, 0 LOB  Activity: placing various sized dowels on multisided vertical post  Sit to stand: Supervision  Stand to sit: Supervision  Comment: MOD v/c's to stand up tall and not bend in knees which is creating a look of a  kyphotic back  Toilet Transfers  Toilet - Technique: Ambulating  Equipment Used: Grab bars  Toilet Transfer: Supervision  Toilet Transfers Comments: w/c <-> toilet     Transfers  Sit to stand: Supervision  Stand to sit: Supervision      Cognition  Cognition Comment: pt unable to sort cards by suit with verbal instruction only, able to sort cards with s/u and demo, able to sequence 4/4 suits with 0 errors. pt instructed to write down directions on how to make a peanut butter and jelly sandwich, pt with decreased comprehension 2° pt wrote down peanut, jelly, bread and when repeatedly asked what's next to make the sandwich pt stated 'you make the sandwich'. Assessment      Activity Tolerance  Activity Tolerance: Patient Tolerated treatment well  Safety Devices  Safety Devices in place: Yes  Type of devices: All fall risk precautions in place       Discharge Recommendations:  Continue to assess pending progress     Plan   Plan  Times per week: 5-7x/wk, 15-90 mins  Plan weeks: 2-3  Current Treatment Recommendations: Strengthening, Balance Training, Functional Mobility Training, Endurance Training, Cognitive Reorientation, Self-Care / ADL, Cognitive/Perceptual Training  G-Code     OutComes Score                                             Goals  Patient Goals   Patient goals : To go home.         Therapy Time   Individual Concurrent Group Co-treatment   Time In 2556         Time Out 7898         Minutes 55               Electronically signed by GLADYS England on 10/24/2017 at 12:03 MO Zarate, GLADYS

## 2017-10-24 NOTE — PROGRESS NOTES
Functional and medical status reassessed regarding patients ability to participate in therapies and patient found to be able to participate in acute intensive comprehensive inpatient rehabilitation program including PT/OT to improve balance, ambulation, ADLs, and to improve the P/AROM. Therapeutic modifications regarding activities in therapies, place, amount of time per day and intensity of therapy made daily. In bed therapies or bedside therapies prn.   2. Bowel and Bladder dysfunction:  frequent toileting, ambulate to bathroom with assistance, check post void residuals. Check for C.difficile x1 if >2 loose stools in 24 hours, continue bowel & bladder program.  Monitor bowel and bladder function. Lactinex 2 PO every AC. MOM prn, Brown Bomb prn, Glycerin suppository prn, enema prn.  3. Severe generalized OA pain: reassess pain every shift and prior to and after each therapy session, give prn medications, modalities prn in therapy, Lidoderm, K-pad prn.   4. Skin healing and breakdown risk:  continue pressure relief program.  Daily skin exams and reports from nursing. 5. Nutritional and hydration deficiency:  continue to monitor I&Os, calorie counts prn, dietary consult prn.  6. Acute episodic insomnia with situational adjustment disorder:  Seroquel, prn Ambien, monitor for day time sedation. 7. Falls risk elevated:  patient to use call light to get nursing assistance to get up, bed and chair alarm. 8. Elevated DVT risk: progressive activities in PT, continue prophylaxis MC hose, elevation and Lovenox (also on ASA). 9. Complex discharge planning:  Discharge date is set for 11/01/17 to home with his wife and home health PT, OT, RN, aide and . Weekly team meeting every Monday to assess progress towards goals, discuss and address social, psychological and medical comorbidities and to address difficulties they may be having progressing in therapy. Patient and family education is in progress.

## 2017-10-25 NOTE — PROGRESS NOTES
Assessment  Patient Currently in Pain: No  Pain Assessment: 0-10  Pain Level: 0  Vital Signs  Patient Currently in Pain: No   Orientation     Objective    ADL  Toileting: Supervision  Toilet Transfers  Toilet - Technique: Ambulating  Equipment Used: Grab bars  Toilet Transfer: Supervision    Pt. completed his menu with minimal assistance for problem solving. Pt. then completed B UE strengthening exercises with 2 lb free weight. Pt. tolerated 1 set x 10 reps x 6 exercises. Pt. on 3 L O2. Assessment      Activity Tolerance  Activity Tolerance: Patient Tolerated treatment well  Safety Devices  Safety Devices in place: Yes  Type of devices: All fall risk precautions in place        Plan   Plan  Times per week: 5-7x/wk, 15-90 mins  Plan weeks: 2-3  Current Treatment Recommendations: Strengthening, Balance Training, Functional Mobility Training, Endurance Training, Cognitive Reorientation, Self-Care / ADL, Cognitive/Perceptual Training    Goals  Patient Goals   Patient goals : To go home.         Therapy Time   Individual Concurrent Group Co-treatment   Time In 0830         Time Out 0900         Minutes [id number] [address] GLADYS [place] Electronically signed by GLADYS Tirado on 10/25/2017 at 11:27 AM

## 2017-10-25 NOTE — PROGRESS NOTES
Physical Therapy  Facility/Department: [facility]  Rehabilitation Daily Treatment Note    NAME: Tia Bagley. : 1934 (80 y.o.)  MRN: [id number]    Account: [de-identified]  Gender: male    Date of Service: 10/25/17      SUBJECTIVE: No new reports    Start and end of tx pain 0/10    OBJECTIVE:    Bed Mobility:   Rolling: Stand by Assist  Supine to sit: Stand by Assist  Sit to Supine: Stand by Assist    Transfers:   Sit to stand: Stand by Assist  Stand to sit: Stand by Assist  Chair to/from bed: Contact Guard  Car Transfer: Contact Guard Assist - VC not to abandon [employer] when entering vehicle    Gait/Ambulation:   Assistive Device: Rolling Walker  Assist Level: Stand by Assist  Distance: 110', 40'  Surface: carpet and linoleum/hardwood  Gait Deviations: Decreased foot clearance b/l with VC for improved heel strike with poor carryover. Stair Negotiation:      4 -6\" x1     Rails: b/l     Assist: CGA     reciprocal    PLAN:   Continue per POC      Comments:    Pt with questionable management of [employer] with approach to WC. Attempts to approach WC laterally vs backing up to chair. Will continue to address. Falls Safety Armband Present: [x] Yes  [] No    Safety/Judgment:    Fair               Minutes: 30      Transfer/Bed mobility trainin      Gait trainin      Neuro re education:      Therapeutic ex:          Therapy Time:    Individual   Time In 1000   Time Out 1030   Minutes 30       Electronically signed by Juanita Morrison PTA on 10/25/2017 at 12:59 PM

## 2017-10-25 NOTE — PROGRESS NOTES
Subjective: The patient complains of moderate to severe acute  on chronic dizziness confusion and generalized weakness delaying relieved by  rest, PT, OT, medication adjustments and exacerbated by recent closed head injury. I am concerned about patients newly diagnosed oral pharyngeal dysphasia,  and reliance on high-dose oxygen secondary to pneumonia. He complains of insomnia relieved with Seroquel. He complains of increased shortness of breath relieved with Dulera. I ordered modified barium swallow to rule out aspiration, I reviewed it, no changes. ROS x10: The patient also complains of severely impaired mobility and activities of daily living. Otherwise no new problems with vision, hearing, nose, mouth, throat, dermal, cardiovascular, GI, , pulmonary, musculoskeletal, psychiatric or neurological. See Rehab H&P on Rehab chart dated 10/21/17. Vital signs:  BP (!) 143/74   Pulse 70   Temp 98 °F (36.7 °C) (Oral)   Resp 16   Ht 6' (1.829 m)   Wt 188 lb 7.9 oz (85.5 kg)   SpO2 98%   BMI 25.56 kg/m²    I/O:   PO/Intake:  fair PO intake, soft diet with thin liquids. Bowel/Bladder:  continent, no problems noted. General:  Patient is well developed, adequately nourished, non-obese and     well kempt. HEENT:    PERRLA, hearing intact to loud voice, external inspection of ear     and nose benign. Inspection of lips, tongue and gums benign  Musculoskeletal: No significant change in strength or tone. All joints stable. Inspection and palpation of digits and nails show no clubbing,       cyanosis or inflammatory conditions. Neuro/Psychiatric: Affect: flat but pleasant. Alert and oriented to person, place and     Situation-with min cues. No significant change in deep tendon reflexes or     sensation  Lungs:  Diminished CTA-B. Respiration effort is normal at rest.     Heart:   S1 = S2, RRR. No loud murmurs.     Abdomen:  Soft, non-tender, no enlargement of liver or spleen. Extremities:  No significant lower extremity edema or tenderness. Skin:   Intact to general survey, no visualized or palpated problems. Rehabilitation:  Physical therapy: FIMS:  Bed Mobility:      Transfers: Sit to Stand: Contact guard assistance  Stand to sit: Contact guard assistance, Ambulation 1  Surface: carpet  Device: Rolling Walker  Other Apparatus: O2  Assistance: Contact guard assistance, Minimal assistance  Quality of Gait: flexed trunk, wbos, bilateral knee instability, decreased heel strike iglesia  Distance: 100 feet  Comments: tolerated longer distance this pm but was fatigued,      FIMS: Bed, Chair, Wheel Chair: 0 - Did not occur  Walk: 2 - Maximal Assistance Requires up to Maximal Assistance AND requires assistance of one person to walk/operate wheelchair between  feet (Patient performs 25-49% of locomotion effort or goes between  feet)  Distance Walked: 100 ft  Stairs: 2- Maximal Assistance Performs 25-49% of the effort to go up and down 4 to 6 stairs and requires the assistance of one person only,  , Assessment: Pt appears to be near functional baseline however reports not as strong as usual and more fatigued. Pt with strength deficits and decreased safety awareness with use of AD requiring consistent verbal cues.     Occupational therapy: FIMS:  Eatin - Feeds self with setup/supervision/cues and/or requires only setup/supervision/cues to perform tube feedings  Groomin - Did not occur  Bathin - Did not occur  Dressing-Upper: 0 - Did not occur  Dressing-Lower: 0 - Did not occur  Toiletin - Did not occur  Toilet Transfer: 0 - Did not occur  Tub Transfer: 0 - Activity does not occur  Shower Transfer: 5 - Supervision, set-up, cues,  ,      Speech therapy: FIMS: Comprehension: 5 - Patient understands basic needs (hungry/hot/pain)  Expression: 5 - Expresses basic ideas/needs only (hungry/hot/pain)  Social Interaction: 6 - Patient requires medication for mood and/or reviewed, rare use of cough syrup from primary care physician. 4. Skin healing and breakdown risk:  continue pressure relief program.  Daily skin exams and reports from nursing. 5. Nutritional and hydration deficiency:  continue to monitor I&Os, calorie counts prn, dietary consult prn.  6. Acute episodic insomnia with situational adjustment disorder:  Seroquel, prn Ambien, monitor for day time sedation. 7. Falls risk elevated:  patient to use call light to get nursing assistance to get up, bed and chair alarm. 8. Elevated DVT risk: progressive activities in PT, continue prophylaxis MC hose, elevation and Lovenox (also on ASA). 9. Complex discharge planning:  Discharge date is set for 11/01/17 to home with his wife and home health PT, OT, RN, aide and . Weekly team meeting next Monday to assess progress towards goals, discuss and address social, psychological and medical comorbidities and to address difficulties they may be having progressing in therapy. Patient and family education is in progress. The patient is to follow-up with their family physician after discharge. Complex Active General Medical Issues that complicate care Assess & Plan:     1. Principal Problem:    Abnormality of gait and mobility due to closed head injury s/p a fall, [facility] admit 10/20/17  Active Problems:  1. Closed head injury   Metabolic encephalopathy-add speech therapy, PT and OT monitor closely well on blood thinners-limit toxic medications provide hydration  2. CAD (coronary artery disease) s/p ALIYAH to mid Circ. 3/2012, HTN (hypertension), dyslipidemia - continue blood pressure checks, adjust/add medications (Tenormin, Nitrostat, Zocor). 3.   Depression - emotional support, adjust/add medications (Seroquel, Remeron, Zoloft). 4.   Dementia - Namenda. 5.   Macular degeneration, glaucoma. - Xalatan, Artificial Tears. 6.  Acute Pneumonia - on PO Augmentin.   Check modified barium swallow patient is okay to be in soft with thins afar consult speech therapy  7. Acid reflux - Protonix, elevate head of bed. 8.    Chronic obstructive pulmonary disease with acute exacerbation and shortness of breath (relieved with Dulera) - Pulse oximeter checks, monitor vital signs, 3 liters oxygen prn. Proventil, Duoneb, Dulera. Continue Afrin and humidity to oxygen. 9.   Obstructive sleep apnea syndrome - O2 at night elevate head of bed  10. Oropharyngeal dysphagia - soft diet with thin liquids. Sp  Modified barium swallow - no changes. This note transcribed by Andy Goltz on 10/25/17 at 10:39 a.m.         Arianna Garrett D.O., PM&R     Attending    [facility] Amanda Peter

## 2017-10-25 NOTE — PROGRESS NOTES
disconnect lg pegs with 0 difficulty. Pt able to  lg pegs from tabletop by color with blue graded clip alternating B hands with color req'ing Rb's and v/c's to follow instruction to  one color group at a time. Assessment      Activity Tolerance  Activity Tolerance: Patient Tolerated treatment well  Safety Devices  Safety Devices in place: Yes  Type of devices: All fall risk precautions in place       Discharge Recommendations:  Continue to assess pending progress     Plan   Plan  Times per week: 5-7x/wk, 15-90 mins  Plan weeks: 2-3  Current Treatment Recommendations: Strengthening, Balance Training, Functional Mobility Training, Endurance Training, Cognitive Reorientation, Self-Care / ADL, Cognitive/Perceptual Training  G-Code     OutComes Score                                             Goals  Patient Goals   Patient goals : To go home.         Therapy Time   Individual Concurrent Group Co-treatment   Time In 1130         Time Out 1200         Minutes 30               Electronically signed by GLADYS England on 10/25/2017 at Sathya Collier [id number] [place]

## 2017-10-25 NOTE — PROGRESS NOTES
[facility]  Speech Language/Pathology  Rehab Daily Note                  Vidya Ojeda.  10/25/2017    Rehab Dx/Hx: Closed injury of head, initial encounter [S09.90XA]  CHI (closed head injury), initial encounter [S09.90XA]    Precautions: falls    ST Dx: [] Aphasia  [] Dysarthria  [] Apraxia   [x] Oropharyngeal dysphagia              [] Cognitive Impairment  [] Other:     Date of Admit: 10/20/2017  Room #: A379/Y856-70    Time in: 1645  Time out: 1222    Subjective:  Behavior: [x] Alert  [x] Cooperative  []  Pleasant  [] Confused  [] Agitated                                          [] Uncooperative  [] Distractible [x] Motivated  [] Self-Limiting [] Anxious          [] Other:    Endurance:  [x] Adequate for participation in SLP sessions  [] Reduced overall              [] Lethargic  [] Other:              Interventions used this date:  [] Speech Treatment       [] Expressive Language  [] Receptive Language   [] Dysphagia Treatment   [] Cognitive Skill Miles  [] Oral Motor  [] Voice Treatment       []  AAC     [] ESTIM  [] Speech production       [x] Therapeutic Meal Monitor               [x] Instruction in compensatory strategies       Objective/Assessment:  Pt was seen for therapeutic meal monitor with lunch tray of egg salad sandwich and thin liquids via straw. Pt was observed to have delayed bolus formation and mild-moderate lingual residuals per solid consistencies. Pt required verbal cues to \"slow down\" and take one bite at a time. Pt independently will take another bite while he still has food in his mouth. He responded positively to verbal cues to slow down and take one bite at a time. Pt was educated on s/s of aspiration and he was encouraged to produce a hard throat clear/reswallow periodically throughout the assessment. Pt independently utilizes liquid wash to clear solid residuals. Pt produced a clear vocal quality s/p PO intake and use of cues with SLP.  Will continue to follow for continued assessment of diet tolerance and use of compensatory strategies. Treatment/Activity Tolerance:     [x]  Patient tolerated treatment well []  Patient limited by fatique    []  Patient limited by pain  []  Patient limited by other medical complications   []  Other:     Plan: [x]  Continue per plan of care []  Alter current plan (see comments)    []  Plan of care initiated []  Hold pending MD visit []  Discharge    Pain:  [x] Pt demonstrated no s/s of pain during this visit. none  N/A      Patient/ Caregiver Education:  [x] Patient/Caregiver Educated on session and progression towards goals. [] Patient/Caregiver stated verbal understanding of directions. [x] Reinforcement needed;  [x] patient   [] family    Safety Devices:  [x] Call light within reach  [x] Chair alarm activated  [] Bed alarm activated  [] Other:    Gi GARCIA, CCC-SLP 10/25/17

## 2017-10-25 NOTE — PROGRESS NOTES
Malik Irby is a 80 y.o. male patient. Admitted to rehab due to falls, gait abnormality, closed head injury. We've been consulted to address his medical issues and resume home medications.     Current Facility-Administered Medications   Medication Dose Route Frequency Provider Last Rate Last Dose    mometasone-formoterol (DULERA) 200-5 MCG/ACT inhaler 2 puff  2 puff Inhalation BID Renay Scullin, DO   2 puff at 10/25/17 0524    ipratropium-albuterol (DUONEB) nebulizer solution 1 ampule  1 ampule Inhalation Q4H WA Renay Scullin, DO   1 ampule at 10/25/17 0524    oxymetazoline (AFRIN) 0.05 % nasal spray 2 spray  2 spray Each Nare BID Renay Scullin, DO        sodium chloride (OCEAN, BABY AYR) 0.65 % nasal spray 1 spray  1 spray Each Nare Q4H PRN Renay Scullin, DO        QUEtiapine (SEROQUEL) tablet 12.5 mg  12.5 mg Oral Nightly PRN Efren Babcock MD   12.5 mg at 10/23/17 2248    acetaminophen (TYLENOL) tablet 650 mg  650 mg Oral Q4H PRN Hiren Hamilton MD        docusate sodium (COLACE) capsule 100 mg  100 mg Oral BID Hiren Hamilton MD   100 mg at 10/25/17 0940    enoxaparin (LOVENOX) injection 40 mg  40 mg Subcutaneous Daily Hiren Hamilton MD   40 mg at 10/24/17 2018    magnesium hydroxide (MILK OF MAGNESIA) 400 MG/5ML suspension 30 mL  30 mL Oral Daily PRN Hiren Hamilton MD        albuterol (PROVENTIL) nebulizer solution 2.5 mg  2.5 mg Nebulization Q2H PRN Hiren Hamilton MD        allopurinol (ZYLOPRIM) tablet 100 mg  100 mg Oral Daily Hiren Hamilton MD   100 mg at 10/25/17 0940    amoxicillin-clavulanate (AUGMENTIN) 875-125 MG per tablet 1 tablet  1 tablet Oral BID Hiren Hamilton MD   1 tablet at 10/25/17 0940    antioxidant multivitamin (OCUVITE) tablet  1 tablet Oral BID Hiren Hamilton MD   1 tablet at 10/25/17 0940    artificial tears ophthalmic solution 1 drop  1 drop Both Eyes 4x Daily PRN Hiren Hamilton MD        aspirin chewable tablet 81 mg  81 mg Oral Daily Ramos Pratt MD   81 mg at 10/25/17 0941    atenolol (TENORMIN) tablet 25 mg  25 mg Oral Daily Ramos Pratt MD   25 mg at 10/25/17 0940    donepezil (ARICEPT) tablet 10 mg  10 mg Oral Nightly Ramos Pratt MD   10 mg at 10/24/17 2017    fish oil capsule 3,000 mg  3,000 mg Oral Nightly Ramos Pratt MD   3,000 mg at 10/24/17 2018    latanoprost (XALATAN) 0.005 % ophthalmic solution 1 drop  1 drop Both Eyes Nightly Ramos Pratt MD   1 drop at 10/24/17 2018    memantine (NAMENDA) tablet 5 mg  5 mg Oral BID Ramos Pratt MD   5 mg at 10/25/17 2265    mirtazapine (REMERON) tablet 30 mg  30 mg Oral Nightly Ramos Pratt MD   30 mg at 10/24/17 2017    nitroGLYCERIN (NITROSTAT) SL tablet 0.4 mg  0.4 mg Sublingual Q5 Min PRN Ramos Pratt MD        pantoprazole (PROTONIX) tablet 40 mg  40 mg Oral QAM AC Ramos Pratt MD   40 mg at 10/25/17 0533    potassium chloride (MICRO-K) extended release capsule 20 mEq  20 mEq Oral BID Ramos Pratt MD   20 mEq at 10/25/17 0940    sertraline (ZOLOFT) tablet 50 mg  50 mg Oral Daily Ramos Pratt MD   50 mg at 10/25/17 0941    simvastatin (ZOCOR) tablet 40 mg  40 mg Oral Nightly Ramos Pratt MD   40 mg at 10/24/17 2017     Allergies   Allergen Reactions    Adhesive Tape Rash     blistering    Brimonidine     Niacin And Related     Promethazine Hcl Other (See Comments)    Codeine Anxiety and Other (See Comments)     nightmares    Phenergan [Promethazine] Anxiety     Nightmares and hallucinations    Prednisone Anxiety     Principal Problem:    Abnormality of gait and mobility due to closed head injury s/p a fall, [facility] admit 10/20/17  Active Problems:    CAD (coronary artery disease) s/p ALIYAH to mid Circ. 3/2012    HTN (hypertension)    Depression    Dyslipidemia    Dementia    Macular degeneration    Pneumonia    Acid reflux    Glaucoma    Chronic obstructive pulmonary disease with acute exacerbation ([address] Obstructive sleep apnea syndrome    Metabolic encephalopathy    Gait abnormality    Basal cell papilloma    COPD (chronic obstructive pulmonary disease) (HCC)    Closed head injury    Blood pressure 118/62, pulse 73, temperature 98 °F (36.7 °C), temperature source Oral, resp. rate 16, height 6' (1.829 m), weight 188 lb 7.9 oz (85.5 kg), SpO2 98 %. Subjective:  Symptoms:  Stable. He reports malaise and weakness. No shortness of breath, chest pain, chest pressure or diarrhea. Diet:  Adequate intake. Activity level: Impaired due to weakness. Pain:  He reports no pain. Objective:  General Appearance: In no acute distress. Vital signs: (most recent): Blood pressure 118/62, pulse 73, temperature 98 °F (36.7 °C), temperature source Oral, resp. rate 16, height 6' (1.829 m), weight 188 lb 7.9 oz (85.5 kg), SpO2 98 %. Vital signs are normal.  No fever. Output: Producing urine. HEENT: Normal HEENT exam.    Lungs:  Normal effort and normal respiratory rate. Breath sounds clear to auscultation. Heart: Normal rate. Regular rhythm. Abdomen: Bowel sounds are normal.   There is no abdominal tenderness. Extremities: Decreased range of motion. Neurological: Patient is alert. Pupils:  Pupils are equal, round, and reactive to light. Skin:  Warm and dry. Assessment:    Condition: In stable condition. (Hypertension  COPD  Dementia  Right lower lobe pneumonia  Fall with closed head injury  Gait abnormality   Dysphagia). Plan:   (Resume home medication  On by mouth antibiotics  And enhanced diet due to lateness MBS results of her speech).        Matthew Fernandez PA-C  10/25/2017

## 2017-10-25 NOTE — PROGRESS NOTES
[facility]   Acute  Rehabilitation  RECREATIONAL THERAPY    Recreation Therapy Assessment    Date:  10/25/2017        Patient Name: Dwight Anand MRN: [id number]        YOB: 1934 (80 y.o.)       Gender: male  Diagnosis: Closed head injury s/p fall   Referring Practitioner: Dr. Yoandy Schumacher      RESTRICTIONS/PRECAUTIONS:  Restrictions/Precautions: Fall Risk  Vision: Impaired  Hearing: Exceptions to [facility]  Hearing Exceptions: Bilateral hearing aid (Devices not at facility. )  [id number]   [x] Recreation Therapy referral received. [x] Chart reviewed         [x] Patient interviewed       [x] Recreation Therapy services discussed with patient. Patient reports pain is a  []new current issue  []chronic issue  [x]Not an issue at the moment []other:      Ability to provide information regarding leisure and recreation interests:   []No issues              [x]Issues with cognition (memory, confusion)              []Issues with communication (speech, hearing)              []Issues with mood    Patient:    [] States reason for hospitalization does not hinder patient's participation in his/her leisure and recreational pursuits. [x] Is aware and engages in leisure and recreational pursuits which include the following recreational domains:   [x] Physical   [x] Social   [] Creative   [] Community-based   [x] Solitaire   [x] Has an interest in:  [x]  Pet Therapy  [] Music Therapy  [] Art Therapy [] Recreation Therapy groups    [x] Educated on community, recreational, support group services/resources i.e.: [facility]   [] Plans on resuming his/her previous leisure and recreational pursuits after hospitalization. [x] Educated on relaxation techniques, provided with leisure education, reminders on coping skills given.   [] Declines Recreation Therapy services     Observed/noted emotions:    [] Homesick  [] Anger   [] Depressed   [] Anxious    [x] Cooperative [] Isolated   [x] Pleasant       [] Agitated       Notes: Pt goes out to eat, goes to [employer], does sink exercises, collects sports cards, watches tv, reads, and does word searches. Patient provided with the following accessible and independently used recreation/leisure resources when in hospital room:   [x] 5 days week large print orientation/puzzle activity handout  [x] Word searches, crossword puzzles  [] Creative art independent use kit  [] Journaling kit  [] Creative writing kit  [] Deck of Cards  []     Recommendations:   Recreation Therapy services offered to all [facility] inpatients:  [x]Recommended  []Not recommended secondary to:     Comments: Pt uses O2 all the time.     Electronically signed by: Ousmane Sanchez  Date: 10/25/2017   Electronically signed by Ousmane Sanchez on 10/25/2017 at 4:03 PM

## 2017-10-25 NOTE — PROGRESS NOTES
Physical Therapy  Facility/Department: [facility]  Rehabilitation Daily Treatment Note    NAME: Bri Castle. : 1934 (80 y.o.)  MRN: [id number]    Account: [de-identified]  Gender: male    Date of Service: 10/25/17      SUBJECTIVE: Pt without new complaints at this time. Start and end of tx pain 0/10    OBJECTIVE:    Transfers:   Sit to stand: Stand by Assist  Stand to sit: Stand by Assist  Chair to/from bed: Not tested  Car Transfer: Not tested    Gait/Ambulation:   Assistive Device: Rolling Walker  Assist Level: Stand by Assist  Distance: 125', 40'x2  Surface: carpet  Gait Deviations: Forward flexed posture, decreased step length with VC for heel strike with fair carryover. Exercise: The below exercises were completed to facilitate strength, motor control   and muscular endurance. Standing:  Mini squat  Marching  Hip ABD  Hip extn  HS curl  Heel raises    x10 ea with VC for upright posture    PLAN:   Continue per POC      Comments:  Good tolerance to ambulation, as well as sink exercises this date. Requiring one break with sink ex to improve tolerance.              Falls Safety Armband Present: [x] Yes  [] No    Safety/Judgment:    Good    Minutes: 30      Transfer/Bed mobility training:      Gait training: 15      Neuro re education:      Therapeutic ex: 15    Therapy Time:    Individual   Time In 1430   Time Out 1500   Minutes 30       Electronically signed by Thalia Sanchez PTA on 10/25/2017 at 4:36 PM

## 2017-10-26 NOTE — PROGRESS NOTES
Occupational Therapy  Facility/Department: [facility]  Daily Treatment Note  NAME: Ji Stallings : 1934  MRN: [id number]    Date of Service: 10/26/2017    Patient Diagnosis(es): There were no encounter diagnoses. has a past medical history of Abnormality of gait and mobility; Acid reflux; Acquired cyst of kidney; Alzheimer disease; Arthritis; Basal cell carcinoma of lip; Basal cell papilloma; CAD (coronary artery disease); [facility] Closed head injury; COPD (chronic obstructive pulmonary disease) ([facility] DD (diverticular disease); Dementia; Depression; Diverticulosis of intestine; Dyslipidemia; Gastroesophageal reflux disease; Glaucoma; H/O malignant neoplasm of skin; H/O surgical procedure; Hematuria, microscopic; History of surgical procedure; History of tobacco use; HTN (hypertension); Impaired mobility and activities of daily living; Kidney stone; Macular degeneration; MI (myocardial infarction); Neuromuscular disorder ([facility] NSVT (nonsustained ventricular tachycardia) ([facility] Obstructive apnea; Orthostasis; Pneumonia; and Syncope.   has a past surgical history that includes Upper gastrointestinal endoscopy; Cholecystectomy, laparoscopic (3/13/03); Kidney stone surgery (several); Colonoscopy (10/6/06); Cystoscopy (); Skin cancer excision (06); Lung biopsy (Right, 4/13/10); Coronary angioplasty with stent (3/2012); other surgical history; Appendectomy; skin biopsy; and vascular surgery. Restrictions  Restrictions/Precautions  Restrictions/Precautions: Fall Risk  Subjective   General  Chart Reviewed: Yes  Patient assessed for rehabilitation services?: Yes  Family / Caregiver Present: No  Referring Practitioner: Dr. Melton Essex    Diagnosis: Closed head injury s/p fall   Subjective  Subjective: Pt questionable historian.    Pre Treatment Pain Screening  Pain at present: 0  Pain Assessment  Pain Level: 0   Orientation     Objective        Coordination  Fine Motor: pt able to

## 2017-10-26 NOTE — PROGRESS NOTES
Pulmonary Disorder  (acute or chronic)  [x]   Severe or Chronic w/ Exacerbation  []     Surgical Status No [x]   Surgeries     General []   Surgery Lower []   Abdominal Thoracic or []   Upper Abdominal Thoracic with  PulmonaryDisorder  []     Chest X-ray Clear/Not  Ordered     [x]  Chronic Changes  Results Pending  []  Infiltrates, atelectasis, pleural effusion, or edema  []  Infiltrates in more than one lobe []  Infiltrate + Atelectasis, &/or pleural effusion  []    Respiratory Pattern Regular,  RR = 12-20 [x]  Increased,  RR = 21-25 []  LAURA, irregular,  or RR = 26-30 []  Decreased FEV1  or RR = 31-35 []  Severe SOB, use  of accessory muscles, or RR ? 35  []    Mental Status Alert, oriented,  Cooperative [x]  Confused but Follows commands []  Lethargic or unable to follow commands []  Obtunded  []  Comatose  []    Breath Sounds Clear to  auscultation  []  Decreased unilaterally or  in bases only [x]  Decreased  bilaterally  []  Crackles or intermittent wheezes []  Wheezes []    Cough Strong, Spontan., & nonproductive [x]  Strong,  spontaneous, &  productive []  Weak,  Nonproductive []  Weak, productive or  with wheezes []  No spontaneous  cough or may require suctioning []    Level of Activity Ambulatory []  Ambulatory w/ Assist  [x]  Non-ambulatory []  Paraplegic []  Quadriplegic []    Total    Score:__5_____     Triage Score:___5_____      Tri       Triage:     1. (>20) Freq: Q3    2. (16-20) Freq: Q4   3. (11-15) Freq: QID & Albuterol Q2 PRN    4. (6-10) Freq: TID & Albuterol Q2 PRN    5. (0-5) Freq Q4prn

## 2017-10-26 NOTE — PROGRESS NOTES
Setup  LE Bathing: Supervision;Verbal cueing  UE Dressing: Setup  LE Dressing: Setup; Increased time to complete  Toileting: Supervision      Toilet Transfers  Toilet - Technique: Ambulating  Equipment Used: Grab bars  Toilet Transfer: Stand by assistance  Toilet Transfers Comments: merry ayala for safety 2° O2 tubing management  Shower Transfers  Shower - Transfer From: [facility] [place] - Transfer Type: To and From  Shower - Transfer To: Shower seat with back  Shower - Technique: Ambulating  Shower Transfers: Stand by assistance  Shower Transfers Comments: merry ayala for safety 2° O2 tubing management         Assessment      Activity Tolerance  Activity Tolerance: Patient Tolerated treatment well  Activity Tolerance: 2L O2  Safety Devices  Safety Devices in place: Yes  Type of devices: All fall risk precautions in place       Discharge Recommendations:  Continue to assess pending progress     Plan   Plan  Times per week: 5-7x/wk, 15-90 mins  Plan weeks: 2-3  Current Treatment Recommendations: Strengthening, Balance Training, Functional Mobility Training, Endurance Training, Cognitive Reorientation, Self-Care / ADL, Cognitive/Perceptual Training  G-Code     OutComes Score                                             Goals  Patient Goals   Patient goals : To go home.         Therapy Time   Individual Concurrent Group Co-treatment   Time In 0830         Time Out 0930         Minutes 60               Electronically signed by GLADYS Sandoval on 10/26/2017 at 9:55 AM  GLADYS Sandoval

## 2017-10-26 NOTE — PROGRESS NOTES
Subjective: The patient complains of moderate to severe acute  on chronic dizziness confusion and generalized weakness delaying relieved by  rest, PT, OT, medication adjustments and exacerbated by recent closed head injury. I am concerned about patients newly diagnosed oral pharyngeal dysphasia,  and reliance on high-dose oxygen secondary to pneumonia. He complains of insomnia relieved with Seroquel. He complains of increased shortness of breath relieved with Dulera. I ordered modified barium swallow to rule out aspiration, I reviewed it, no changes. ROS x10: The patient also complains of severely impaired mobility and activities of daily living. Otherwise no new problems with vision, hearing, nose, mouth, throat, dermal, cardiovascular, GI, , pulmonary, musculoskeletal, psychiatric or neurological. See Rehab H&P on Rehab chart dated 10/21/17. Vital signs:  BP (!) 122/58   Pulse 73   Temp 98 °F (36.7 °C) (Oral)   Resp 19   Ht 6' (1.829 m)   Wt 188 lb 7.9 oz (85.5 kg)   SpO2 95%   BMI 25.56 kg/m²    I/O:   PO/Intake:  fair PO intake, soft diet with thin liquids. Bowel/Bladder:  continent, no problems noted. General:  Patient is well developed, adequately nourished, non-obese and     well kempt. HEENT:    PERRLA, hearing intact to loud voice, external inspection of ear     and nose benign. Inspection of lips, tongue and gums benign  Musculoskeletal: No significant change in strength or tone. All joints stable. Inspection and palpation of digits and nails show no clubbing,       cyanosis or inflammatory conditions. Neuro/Psychiatric: Affect: flat but pleasant. Alert and oriented to person, place and     Situation-with min cues. No significant change in deep tendon reflexes or     sensation  Lungs:  Diminished CTA-B. Respiration effort is normal at rest.     Heart:   S1 = S2, RRR. No loud murmurs.     Abdomen:  Soft, non-tender, no enlargement of liver or 5 - Expresses basic ideas/needs only (hungry/hot/pain)  Social Interaction: 5 - Patient is appropriate with supervision/cues  Problem Solvin - Patient able to solve simple/routine tasks  Memory: 5 - Patient requires prompting with stress/unfamiliar situations      Lab/X-ray studies reviewed, analyzed and discussed with patient and staff:   No results found for this or any previous visit (from the past 24 hour(s)). Previous extensive, complex labs, notes and diagnostics reviewed and analyzed. ALLERGIES:    Allergies as of 10/20/2017 - Review Complete 10/20/2017   Allergen Reaction Noted    Adhesive tape Rash 2014    Brimonidine  2016    Niacin and related  2016    Promethazine hcl Other (See Comments) 2017    Codeine Anxiety and Other (See Comments) 2015    Phenergan [promethazine] Anxiety 2015    Prednisone Anxiety 2015      (please also verify by checking [facility])       Complex Physical Medicine & Rehab Issues Assess & Plan:   1. Severe abnormality of gait and mobility and impaired self-care and ADL's secondary to closed head injury. Functional and medical status reassessed regarding patients ability to participate in therapies and patient found to be able to participate in acute intensive comprehensive inpatient rehabilitation program including PT/OT to improve balance, ambulation, ADLs, and to improve the P/AROM. Therapeutic modifications regarding activities in therapies, place, amount of time per day and intensity of therapy made daily. In bed therapies or bedside therapies prn.   2. Bowel and Bladder dysfunction:  frequent toileting, ambulate to bathroom with assistance, check post void residuals. Check for C.difficile x1 if >2 loose stools in 24 hours, continue bowel & bladder program.  Monitor bowel and bladder function. Lactinex 2 PO every AC.   MOM prn, Brown Bomb prn, Glycerin suppository prn, enema prn.  3. Severe generalized OA pain: reassess pain every shift and prior to and after each therapy session, give prn medications, modalities prn in therapy, Lidoderm, K-pad prn. OARRS reviewed, rare use of cough syrup from primary care physician. 4. Skin healing and breakdown risk:  continue pressure relief program.  Daily skin exams and reports from nursing. 5. Nutritional and hydration deficiency:  continue to monitor I&Os, calorie counts prn, dietary consult prn.  6. Acute episodic insomnia with situational adjustment disorder:  Seroquel, prn Ambien, monitor for day time sedation. 7. Falls risk elevated:  patient to use call light to get nursing assistance to get up, bed and chair alarm. 8. Elevated DVT risk: progressive activities in PT, continue prophylaxis MC hose, elevation and Lovenox (also on ASA). 9. Complex discharge planning:  Discharge date is set for 11/01/17 to home with his wife and home health PT, OT, RN, aide and  versus assisted living. Final weekly team meeting  Monday to assess progress towards goals, discuss and address social, psychological and medical comorbidities and to address difficulties they may be having progressing in therapy. Patient and family education is in progress. The patient is to follow-up with their family physician after discharge. Complex Active General Medical Issues that complicate care Assess & Plan:     1. Principal Problem:    Abnormality of gait and mobility due to closed head injury s/p a fall, [facility] admit 10/20/17  Active Problems:  1. Closed head injury   Metabolic encephalopathy-add speech therapy, PT and OT monitor closely well on blood thinners-limit toxic medications provide hydration  2. CAD (coronary artery disease) s/p ALIYAH to mid Circ. 3/2012, HTN (hypertension), dyslipidemia - continue blood pressure checks, adjust/add medications (Tenormin, Nitrostat, Zocor). 3.   Depression - emotional support, adjust/add medications (Seroquel, Remeron, Zoloft).   4. Dementia - Namenda. 5.   Macular degeneration, glaucoma. - Xalatan, Artificial Tears. 6.  Acute Pneumonia - on PO Augmentin. Check modified barium swallow patient is okay to be in soft with thins afar consult speech therapy  7. Acid reflux - Protonix, elevate head of bed. 8.    Chronic obstructive pulmonary disease with acute exacerbation and shortness of breath (relieved with Dulera) - Pulse oximeter checks, monitor vital signs, 3 liters oxygen prn. Proventil, Duoneb, Dulera. Continue Afrin and humidity to oxygen. 9.   Obstructive sleep apnea syndrome - O2 at night elevate head of bed  10. Oropharyngeal dysphagia - soft diet with thin liquids. Sp  Modified barium swallow - no changes. This note transcribed by Trina Davis on 10/26/17 at 11:21 a.m.         Elvira Duncan D.O., PM&R     Attending    [address]

## 2017-10-26 NOTE — PLAN OF CARE
Problem: Pain Control  Goal: Maintain pain level at or below patient's acceptable level (or 5 if patient is unable to determine acceptable level)  Outcome: Ongoing      Problem: Neurological  Goal: Maximum potential motor/sensory/cognitive function  Outcome: Ongoing      Problem: Respiratory  Goal: O2 Sat > 90%  Outcome: Ongoing      Problem: Skin Integrity/Risk  Goal: No skin breakdown during hospitalization  Outcome: Ongoing

## 2017-10-27 NOTE — PROGRESS NOTES
Assessment  Patient Currently in Pain: No  Pain Assessment: 0-10  Pain Level: 0  Vital Signs  Patient Currently in Pain: No   Orientation     Objective    Pt. engaged in cognitive, strengthening, and fine motor tasks. Pt. had 1 lb wrist weights on B wrists. Pt. reached to match cards on large board by number and suit. Pt. had no errors. Pt. pushed bean bags over incline board with jeimy x 2 trials. Pt. then connected nuts and bolts together with moderate difficulty choosing the correct match. Pt. completed toilet transfer at [facility] and toileting at [facility]. Pt. completed 2 sets x 10 reps of green hand gripper to promote increased  strength. Pt. on 2L O2. Assessment      Activity Tolerance  Activity Tolerance: Patient Tolerated treatment well  Safety Devices  Safety Devices in place: Yes  Type of devices: All fall risk precautions in place        Plan   Plan  Times per week: 5-7x/wk, 15-90 mins  Plan weeks: 2-3  Current Treatment Recommendations: Strengthening, Balance Training, Functional Mobility Training, Endurance Training, Cognitive Reorientation, Self-Care / ADL, Cognitive/Perceptual Training  Goals  Patient Goals   Patient goals : To go home.         Therapy Time   Individual Concurrent Group Co-treatment   Time In 1100         Time Out 1200         Minutes [id number] Electronically signed by GLADYS Anna on 10/27/2017 at 12:00 PM

## 2017-10-27 NOTE — PROGRESS NOTES
Physical Therapy  Facility/Department: [facility]  Rehabilitation Daily Treatment Note    NAME: Vidya Ojeda. : 1934 (80 y.o.)  MRN: [id number]    Account: [de-identified]  Gender: male    Date of Service: 10/27/17      SUBJECTIVE:     Start and end of tx pain 0/10    OBJECTIVE:    Bed Mobility:   Rolling: Modified Independent  Supine to sit: Modified Independent  Sit to Supine: Modified Independent  Increased effort with supine to sit but able to complete without cues. Transfers:   Sit to stand: Supervision  Stand to sit: Stand by Assist with VCs to reach back for chair to improve safety   Chair to/from bed: Stand by Assist with and without [employer]. Improved safety with [employer]  Car Transfer: Not tested    Gait/Ambulation:   Assistive Device: Rolling Walker  Assist Level: Stand by [employer]: 150 feet  Surface: carpet and linoleum/hardwood  Gait Deviations: flexed knees and trunk with verbal and tactile cues to correct. Stair Negotiation:      4 -6\" x1     Rails:2     Assist:Stand by Assist    Non - reciprocal    PLAN:   Continue per POC      Comments:      Occasional cues to improve safety with transfers. Falls Safety Armband Present: [x] Yes  [] No               Minutes:30      Transfer/Bed mobility training:10      Gait trainin      Neuro re education:      Therapeutic ex:          Therapy Time:    Individual   Time In 1000   Time Out 1030   Minutes 30       Electronically signed by Lenka Garg PTA on 10/27/2017 at 10:16 AM

## 2017-10-27 NOTE — PROGRESS NOTES
Ji Stallings is a 80 y.o. male patient. Admitted to rehab due to falls, gait abnormality, closed head injury. We've been consulted to address his medical issues and resume home medications.     Current Facility-Administered Medications   Medication Dose Route Frequency Provider Last Rate Last Dose    ipratropium-albuterol (DUONEB) nebulizer solution 1 ampule  1 ampule Inhalation 4x daily Renay Scullin, DO   1 ampule at 10/27/17 0516    mometasone-formoterol (DULERA) 200-5 MCG/ACT inhaler 2 puff  2 puff Inhalation BID Renay Scullin, DO   2 puff at 10/27/17 0516    oxymetazoline (AFRIN) 0.05 % nasal spray 2 spray  2 spray Each Nare BID Renay Scullin, DO   2 spray at 10/26/17 2145    sodium chloride (OCEAN, BABY AYR) 0.65 % nasal spray 1 spray  1 spray Each Nare Q4H PRN Renay Scullin, DO        QUEtiapine (SEROQUEL) tablet 12.5 mg  12.5 mg Oral Nightly PRN Jackie Summers MD   12.5 mg at 10/23/17 2248    acetaminophen (TYLENOL) tablet 650 mg  650 mg Oral Q4H PRN Abundio Cao MD        docusate sodium (COLACE) capsule 100 mg  100 mg Oral BID Abundio Cao MD   100 mg at 10/26/17 2144    enoxaparin (LOVENOX) injection 40 mg  40 mg Subcutaneous Daily Abundio Cao MD   40 mg at 10/26/17 2145    magnesium hydroxide (MILK OF MAGNESIA) 400 MG/5ML suspension 30 mL  30 mL Oral Daily PRN Abundio Cao MD        albuterol (PROVENTIL) nebulizer solution 2.5 mg  2.5 mg Nebulization Q2H PRN Abundio Cao MD        allopurinol (ZYLOPRIM) tablet 100 mg  100 mg Oral Daily Abundio Cao MD   100 mg at 10/26/17 0813    amoxicillin-clavulanate (AUGMENTIN) 875-125 MG per tablet 1 tablet  1 tablet Oral BID Abundio Cao MD   1 tablet at 10/26/17 2144    antioxidant multivitamin (OCUVITE) tablet  1 tablet Oral BID Abundio Cao MD   1 tablet at 10/26/17 2144    artificial tears ophthalmic solution 1 drop  1 drop Both Eyes 4x Daily PRN Abundio Cao MD        aspirin

## 2017-10-27 NOTE — PROGRESS NOTES
Occupational Therapy  Facility/Department: [facility]  Daily Treatment Note  NAME: Huma Soto. : 1934  MRN: [id number]    Date of Service: 10/27/2017    Patient Diagnosis(es): There were no encounter diagnoses. has a past medical history of Abnormality of gait and mobility; Acid reflux; Acquired cyst of kidney; Alzheimer disease; Arthritis; Basal cell carcinoma of lip; Basal cell papilloma; CAD (coronary artery disease); Cancer ([facility] Closed head injury; COPD (chronic obstructive pulmonary disease) ([facility] DD (diverticular disease); Dementia; Depression; Diverticulosis of intestine; Dyslipidemia; Gastroesophageal reflux disease; Glaucoma; H/O malignant neoplasm of skin; H/O surgical procedure; Hematuria, microscopic; History of surgical procedure; History of tobacco use; HTN (hypertension); Impaired mobility and activities of daily living; Kidney stone; Macular degeneration; MI (myocardial infarction); Neuromuscular disorder ([facility] NSVT (nonsustained ventricular tachycardia) ([facility] Obstructive apnea; Orthostasis; Pneumonia; and Syncope.   has a past surgical history that includes Upper gastrointestinal endoscopy; Cholecystectomy, laparoscopic (3/13/03); Kidney stone surgery (several); Colonoscopy (10/6/06); Cystoscopy (); Skin cancer excision (06); Lung biopsy (Right, 4/13/10); Coronary angioplasty with stent (3/2012); other surgical history; Appendectomy; skin biopsy; and vascular surgery. Restrictions  Restrictions/Precautions  Restrictions/Precautions: Fall Risk  Subjective   General  Chart Reviewed: Yes  Patient assessed for rehabilitation services?: Yes  Family / Caregiver Present: No  Referring Practitioner: Dr. Raymundo gNuyen    Diagnosis: Closed head injury s/p fall   Subjective  Subjective: Pt questionable historian.    Pre Treatment Pain Screening  Pain at present: 0  Pain Assessment  Patient Currently in Pain: No  Pain Level: 0  Vital Signs  Patient Currently in Pain: No

## 2017-10-27 NOTE — PROGRESS NOTES
Subjective: The patient complains of moderate to severe acute  on chronic dizziness confusion and generalized weakness delaying relieved by  rest, PT, OT, medication adjustments and exacerbated by recent closed head injury. I am concerned about patients newly diagnosed oral pharyngeal dysphasia,  and reliance on high-dose oxygen secondary to pneumonia. ROS x10: The patient also complains of severely impaired mobility and activities of daily living. Otherwise no new problems with vision, hearing, nose, mouth, throat, dermal, cardiovascular, GI, , pulmonary, musculoskeletal, psychiatric or neurological. See Rehab H&P on Rehab chart dated 10/21/17. Vital signs:  BP (!) 147/71   Pulse 68   Temp 97 °F (36.1 °C) (Oral)   Resp 18   Ht 6' (1.829 m)   Wt 188 lb 7.9 oz (85.5 kg)   SpO2 94%   BMI 25.56 kg/m²    I/O:   PO/Intake:  fair PO intake, soft diet with thin liquids. Bowel/Bladder:  continent, no problems noted. General:  Patient is well developed, adequately nourished, non-obese and     well kempt. HEENT:    PERRLA, hearing intact to loud voice, external inspection of ear     and nose benign. Inspection of lips, tongue and gums benign  Musculoskeletal: No significant change in strength or tone. All joints stable. Inspection and palpation of digits and nails show no clubbing,       cyanosis or inflammatory conditions. Neuro/Psychiatric: Affect: flat but pleasant. Alert and oriented to person, place and     Situation-with min cues. No significant change in deep tendon reflexes or     sensation  Lungs:  Diminished CTA-B. Respiration effort is normal at rest.     Heart:   S1 = S2, RRR. No loud murmurs. Abdomen:  Soft, non-tender, no enlargement of liver or spleen. Extremities:  No significant lower extremity edema or tenderness. Skin:   Intact to general survey, no visualized or palpated problems.     Rehabilitation:  Physical therapy: FIMS:  Bed Mobility: Transfers: Sit to Stand: Contact guard assistance  Stand to sit: Contact guard assistance, Ambulation 1  Surface: carpet  Device: Rolling Walker  Other Apparatus: O2  Assistance: Contact guard assistance, Minimal assistance  Quality of Gait: flexed trunk, wbos, bilateral knee instability, decreased heel strike iglesia  Distance: 100 feet  Comments: tolerated longer distance this pm but was fatigued,      FIMS: Bed, Chair, Wheel Chair: 4 - Requires steadying assistance only <25% assist  and/or requires assist with one leg only  Walk: 2 - Maximal Assistance Requires up to Maximal Assistance AND requires assistance of one person to walk/operate wheelchair between  feet (Patient performs 25-49% of locomotion effort or goes between  feet)  Distance Walked: 125'  Stairs: 2- Maximal Assistance Performs 25-49% of the effort to go up and down 4 to 6 stairs and requires the assistance of one person only,  , Assessment: Pt appears to be near functional baseline however reports not as strong as usual and more fatigued. Pt with strength deficits and decreased safety awareness with use of AD requiring consistent verbal cues.     Occupational therapy: FIMS:  Eatin - Feeds self with setup/supervision/cues and/or requires only setup/supervision/cues to perform tube feedings  Groomin - Requires setup/cues to do all tasks  Bathin - Able to bathe all 10 areas with setup/sup/cues  Dressing-Upper: 5 - Requires setup/supervision/cues and/or requires assist with presthesis/brace only  Dressing-Lower: 5 - Requires setup/supervision/cues and/or staff applies TEDS/prosthesis/brace only  Toiletin - Requires setup/supervision/cues  Toilet Transfer: 5 - Requires setup/supervision/cues  Tub Transfer: 0 - Activity does not occur  Shower Transfer: 5 - Supervision, set-up, cues,  ,      Speech therapy: FIMS: Comprehension: 4 - Patient understands basic needs 75-90%+ of the time  Expression: 4 - Expresses basic ideas/needs

## 2017-10-28 NOTE — PROGRESS NOTES
procedure 7/29/2014    Overview:  RUQ removed 4/15/10 lung ca Overview:  RUQ removed 4/15/10 lung ca    Hematuria, microscopic 7/13/2009    History of surgical procedure 7/29/2014    Overview:  RUQ removed 4/15/10 lung ca     History of tobacco use 7/13/2009    HTN (hypertension)     Impaired mobility and activities of daily living     Kidney stone     Macular degeneration     MI (myocardial infarction)     [facility]rom[facility]     NSVT (nonsustained ventricular tachycardia) ([facility] 3/29/2012    Obstructive apnea 7/29/2014    Overview:  Non compliant w/ CPAP, uses 2.5 L NC with sleep     Orthostasis     Pneumonia     Syncope 6/28/2012     Past Surgical History:   Procedure Laterality Date    APPENDECTOMY      CHOLECYSTECTOMY, LAPAROSCOPIC  3/13/03    COLONOSCOPY  10/6/06    CORONARY ANGIOPLASTY WITH STENT PLACEMENT  3/2012    CYSTOSCOPY  2005    KIDNEY STONE SURGERY  several    LUNG BIOPSY Right 4/13/10    OTHER SURGICAL HISTORY      cardiac stents    SKIN BIOPSY      SKIN CANCER EXCISION  9/12/06    UPPER GASTROINTESTINAL ENDOSCOPY      VASCULAR SURGERY           Restrictions  Restrictions/Precautions: Fall Risk    Subjective   General  Chart Reviewed: Yes  Family / Caregiver Present: No  Subjective  Subjective: nothing new to report,ok with tx       Pain Screening  Patient Currently in Pain: Denies     Pain Reassessment: 0/10   Orientation   Objective   Bed mobility  Rolling to Left: Supervision  Rolling to Right: Supervision  Supine to Sit: Stand by assistance    Transfers  Sit to Stand: Contact guard assistance    Ambulation  Ambulation?: Yes  Ambulation 1  Surface: carpet;level tile  Device: Rolling Walker  Other Apparatus: O2 (2.5L)  Assistance: Stand by assistance  Quality of Gait: flexed trunk, wbos, iglesia flexed knees, decreased heel strike iglesia  Distance: 130'  Stairs/Curb  Stairs?: Yes  Stairs  # Steps : 8  Stairs Height: 6\"  Rails: Bilateral  Device: No Device  Assistance: Stand by assistance  Comment: non recip     Assessment   Body structures, Functions, Activity limitations: Decreased functional mobility ; Decreased strength;Decreased safe awareness;Decreased balance;Decreased high-level IADLs  Assessment: pt req'd cueing to stay within maribeth of ww when changing threshold into clinic, flexed fwd posture  REQUIRES PT FOLLOW UP: Yes     Discharge Recommendations:  Continue to assess pending progress    Goals  Short term goals  Short term goal 1: Pt will complete transfers with supervision with LRAD  Short term goal 2: Pt will amb 25' x3 with LRAD and supervision  Short term goal 3: Pt will complete standing activities with B UE support and supervision    Plan    Plan  Times per week: 5-7  Plan weeks: 1-2  Current Treatment Recommendations: Strengthening, Balance Training, Functional Mobility Training, Transfer Training, ADL/Self-care Training, IADL Training, Neuromuscular Re-education, Endurance Training, Gait Training, Home Exercise Program, Equipment Evaluation, Education, & procurement, Safety Education & Training, Patient/Caregiver Education & Training  Safety Devices  Type of devices:  All fall risk precautions in place     Therapy Time   Individual   Time In 1300   Time Out 1330   Minutes 30     Gait x 20  BM x 10     Katerina Mtz PTA, 10/28/17 at 3:56 PM

## 2017-10-28 NOTE — PROGRESS NOTES
syrup from primary care physician. 4. Skin breakdown risk:  continue pressure relief program.  Daily skin exams and reports from nursing. 5. Nutritional and hydration deficiency:  continue to monitor I&Os, calorie counts prn, dietary consult prn. Add vitamin B12 vitamin D and CoQ10  6. Acute episodic insomnia with situational adjustment disorder:  Seroquel, prn Ambien, monitor for day time sedation. 7. Falls risk elevated:  patient to use call light to get nursing assistance to get up, bed and chair alarm. 8. Elevated DVT risk: progressive activities in PT, continue prophylaxis MC hose, elevation and Lovenox (also on ASA). 9. Complex discharge planning:  Discharge date is set for 11/01/17 to home with his wife and home health PT, OT, RN, aide and  versus assisted living. Final weekly team meeting  Monday to assess progress towards goals, discuss and address social, psychological and medical comorbidities and to address difficulties they may be having progressing in therapy. Patient and family education is in progress. The patient is to follow-up with their family physician after discharge. Complex Active General Medical Issues that complicate care Assess & Plan:     1. Principal Problem:    Abnormality of gait and mobility due to closed head injury s/p a fall, [facility] admit 10/20/17  Active Problems:  1. Closed head injury   Metabolic encephalopathy-add speech therapy, PT and OT monitor closely well on blood thinners-limit toxic medications provide hydration  2. CAD (coronary artery disease) s/p ALIYAH to mid Circ. 3/2012, HTN (hypertension), dyslipidemia - continue blood pressure checks, adjust/add medications (Tenormin, Nitrostat, Zocor). 3.   Depression - emotional support, adjust/add medications (Seroquel, Remeron, Zoloft). 4.   Dementia - Namenda. 5.   Macular degeneration, glaucoma. - Xalatan, Artificial Tears. 6.  Acute Pneumonia - on PO Augmentin.   Check modified barium swallow patient is okay to be in soft with thins afar consult speech therapy  7. Acid reflux - Protonix, elevate head of bed. 8.    Chronic obstructive pulmonary disease with acute exacerbation and shortness of breath (relieved with Dulera) - Pulse oximeter checks, monitor vital signs, 3 liters oxygen prn. Proventil, Duoneb, Dulera. Continue Afrin and humidity to oxygen. 9.   Obstructive sleep apnea syndrome - O2 at night elevate head of bed  10. Oropharyngeal dysphagia - SLP, soft diet with thin liquids. Sp  Modified barium swallow - no changes.                    Lana Reynoso D.O., PM&R     Attending    [address]

## 2017-10-29 NOTE — PROGRESS NOTES
[facility] AT [place] Respiratory Therapy Evaluation   Current Order: QID duoneb      Home Regimen:QID     Ordering Physician:Bijan  Re-evaluation Date: 11/1    Diagnosis:COPD     Patient Status: Stable / Unstable + Physician notified    The following MDI Criteria must be met in order to convert aerosol to MDI with spacer. If unable to meet, MDI will be converted to aerosol:  []  Patient able to demonstrate the ability to use MDI effectively  []  Patient alert and cooperative  []  Patient able to take deep breath with 5-10 second hold  []  Medication(s) available in this delivery method   []  Peak flow greater than or equal to 200 ml/min            Current Order Substituted To  (same drug, same frequency)   Aerosol to MDI [] Albuterol Sulfate 0.083% unit dose by aerosol Albuterol Sulfate MDI 2 puffs by inhalation with spacer    [] Levalbuterol 1.25 mg unit dose by aerosol Levalbuterol MDI 2 puffs by inhalation with spacer    [] Levalbuterol 0.63 mg unit dose by aerosol Levalbuterol MDI 2 puffs by inhalation with spacer    [] Ipratropium Bromide 0.02% unit dose by aerosol Ipratropium Bromide MDI 2 puffs by inhalation with spacer    [] Duoneb (Ipratropium + Albuterol) unit dose by aerosol Ipratropium MDI + Albuterol MDI 2 puffs by inhalation w/spacer   MDI to Aerosol [] Albuterol Sulfate MDI Albuterol Sulfate 0.083% unit dose by aerosol    [] Levalbuterol MDI 2 puffs by inhalation Levalbuterol 1.25 mg unit dose by aerosol    [] Ipratropium Bromide MDI by inhalation Ipratropium Bromide 0.02% unit dose by aerosol    [] Combivent (Ipratropium + Albuterol) MDI by inhalation Duoneb (Ipratropium + Albuterol) unit dose by aerosol   Treatment Assessment [Frequency/Schedule]:  Change frequency to: ___QID duoneb_______________________________________________per Protocol, P&T, MEC      Points 0 1 2 3 4   Pulmonary Status  Non-Smoker  []   Smoking history   < 20 pack years  []   Smoking history  ?  20 pack years  []   Pulmonary

## 2017-10-29 NOTE — PROGRESS NOTES
problems.     Rehabilitation:  Physical therapy: FIMS:  Bed Mobility:      Transfers: Sit to Stand: Contact guard assistance  Stand to sit: Contact guard assistance, Ambulation 1  Surface: carpet, level tile  Device: Rolling Walker  Other Apparatus: O2 (2.5L)  Assistance: Stand by assistance  Quality of Gait: flexed trunk, wbos, iglesia flexed knees, decreased heel strike iglesia  Distance: 130'  Comments: tolerated longer distance this pm but was fatigued, Stairs  # Steps : 8  Stairs Height: 6\"  Rails: Bilateral  Device: No Device  Assistance: Stand by assistance  Comment: non recip    FIMS: Bed, Chair, Wheel Chair: 5 - Requires setup/supervision/cues  Walk: 2 - Maximal Assistance Requires up to Maximal Assistance AND requires assistance of one person to walk/operate wheelchair between  feet (Patient performs 25-49% of locomotion effort or goes between  feet)  Distance Walked: 125'  Stairs: 2- Maximal Assistance Performs 25-49% of the effort to go up and down 4 to 6 stairs and requires the assistance of one person only,  , Assessment: pt req'd cueing to stay within maribeth of ww when changing threshold into clinic, flexed fwd posture    Occupational therapy: FIMS:  Eatin - Feeds self with setup/supervision/cues and/or requires only setup/supervision/cues to perform tube feedings  Groomin - Did not occur  Bathin - Did not occur  Dressing-Upper: 0 - Did not occur  Dressing-Lower: 0 - Did not occur  Toiletin - Requires setup/supervision/cues  Toilet Transfer: 5 - Requires setup/supervision/cues  Tub Transfer: 0 - Activity does not occur  Shower Transfer: 5 - Supervision, set-up, cues,  ,      Speech therapy: FIMS: Comprehension: 4 - Patient understands basic needs 75-90%+ of the time  Expression: 4 - Expresses basic ideas/needs 75-90%+ of the time  Social Interaction: 4 - Patient appropriate 75-90%+ of the time  Problem Solving: 3 - Patient solves simple/routine tasks 50%-74%  Memory: 2 - Patient remembers 25%-49% of the time      Lab/X-ray studies reviewed, analyzed and discussed with patient and staff:   No results found for this or any previous visit (from the past 24 hour(s)). Previous extensive, complex labs, notes and diagnostics reviewed and analyzed. ALLERGIES:    Allergies as of 10/20/2017 - Review Complete 10/20/2017   Allergen Reaction Noted    Adhesive tape Rash 12/29/2014    Brimonidine  09/28/2016    Niacin and related  09/28/2016    Promethazine hcl Other (See Comments) 08/29/2017    Codeine Anxiety and Other (See Comments) 07/21/2015    Phenergan [promethazine] Anxiety 07/21/2015    Prednisone Anxiety 07/21/2015      (please also verify by checking [facility])       Complex Physical Medicine & Rehab Issues Assess & Plan:   1. Severe abnormality of gait and mobility and impaired self-care and ADL's secondary to closed head injury. Functional and medical status reassessed regarding patients ability to participate in therapies and patient found to be able to participate in acute intensive comprehensive inpatient rehabilitation program including PT/OT to improve balance, ambulation, ADLs, and to improve the P/AROM. Therapeutic modifications regarding activities in therapies, place, amount of time per day and intensity of therapy made daily. In bed therapies or bedside therapies prn.   2. Bowel and Bladder dysfunction:  frequent toileting, ambulate to bathroom with assistance, check post void residuals. Check for C.difficile x1 if >2 loose stools in 24 hours, continue bowel & bladder program.  Monitor bowel and bladder function. Lactinex 2 PO every AC. MOM prn, Brown Bomb prn, Glycerin suppository prn, enema prn. 3. Moderate generalized OA pain: reassess pain every shift and prior to and after each therapy session, give prn  Tylenol, modalities prn in therapy, Lidoderm, K-pad prn. OARRS reviewed, rare use of cough syrup from primary care physician. 4. Skin breakdown risk:   Add egg crate mattress continue pressure relief program.  Daily skin exams and reports from nursing. 5. Nutritional and hydration deficiency:  continue to monitor I&Os, calorie counts prn, dietary consult prn. Add vitamin B12 vitamin D and CoQ10  6. Acute episodic insomnia with situational adjustment disorder:  Seroquel, prn Ambien, monitor for day time sedation. 7. Falls risk elevated:  patient to use call light to get nursing assistance to get up, bed and chair alarm. 8. Elevated DVT risk: progressive activities in PT, continue prophylaxis MC hose, elevation and Lovenox (also on ASA). 9. Complex discharge planning:  Discharge date is set for 11/01/17 to home with his wife and home health PT, OT, RN, aide and  versus assisted living. Final weekly team meeting  Monday to assess progress towards goals, discuss and address social, psychological and medical comorbidities and to address difficulties they may be having progressing in therapy. Patient and family education is in progress. The patient is to follow-up with their family physician after discharge. Complex Active General Medical Issues that complicate care Assess & Plan:     1. Principal Problem:    Abnormality of gait and mobility due to closed head injury s/p a fall, [facility] admit 10/20/17  Active Problems:  1. Closed head injury   Metabolic encephalopathy-add speech therapy, PT and OT monitor closely well on blood thinners-limit toxic medications provide hydration  2. CAD (coronary artery disease) s/p ALIYAH to [place] Circ. 3/2012, HTN (hypertension), dyslipidemia - continue blood pressure checks, adjust/add medications (Tenormin, Nitrostat, Zocor). 3.   Depression - emotional support, adjust/add medications (Seroquel, Remeron, Zoloft). 4.   Dementia - Namenda. 5.   Macular degeneration, glaucoma. - Xalatan, Artificial Tears. 6.  Acute Pneumonia - on PO Augmentin.   Check modified barium swallow patient is okay to be in soft

## 2017-10-30 NOTE — PROGRESS NOTES
per plan of care []  Alter current plan (see comments)    []  Plan of care initiated []  Hold pending MD visit []  Discharge    Pain:  [x] Pt demonstrated no s/s of pain during this visit. Patient/ Caregiver Education:  [x] Patient/Caregiver Educated on session and progression towards goals. [x] Patient/Caregiver stated verbal understanding of directions. [] Reinforcement needed;  [] patient   [] family    Safety Devices:  [x] Call light within reach  [x] Chair alarm activated  [] Bed alarm activated  [] Other:    Comprehension          Expression   []7 - Independent   []7 - Indpendent   []6 - Modified Independent  []6 - Modified Independent   []5 - Supervision   []5 - Supervision   []4 - Min Assist   [x]4 - Min Assist   [x]3 - Mod Assist   []3 - Mod Assist   []2 - Max Assist   []2 - Max Assist   []1 - Dependent   []1 - Dependent    Problem Solving        Memory   []7 - Independent   []7 - Independent   []6 - Modified Independent  []6 - Modified Independent   []5 - Supervision   []5 - Supervision   []4 - Min Assist   []4 - Min Assist   []3 - Mod Assist   []3 - Mod Assist   [x]2 - Max Assist   [x]2 - Max Assist   []1 - Dependent   [] 1 -Dependent    Paul PLATA, CCC-SLP 10/30/17

## 2017-10-30 NOTE — PROGRESS NOTES
increase function for task completion. Pt completed nuts and bolts, golf tees and pennies in/out of pegboard and Hi Q's to increase function of UE's. Pt tolerated. Assessment Pt making gains to increase function. Discharge Recommendations:  Continue to assess pending progress     Plan   Plan  Times per week: 5-7x/wk, 15-90 mins  Plan weeks: 2-3  Current Treatment Recommendations: Strengthening, Balance Training, Functional Mobility Training, Endurance Training, Cognitive Reorientation, Self-Care / ADL, Cognitive/Perceptual Training  Plan Comment: Continue OT POC  G-Code     OutComes Score                                             Goals  Patient Goals   Patient goals : To go home.         Therapy Time   Individual Concurrent Group Co-treatment   Time In 1556,1939 8558     Time Out 7468,7554   9434     Min[facility] 20, [facility]01 [facility] Electronically signed by HIRAM Rosen/L on 94/45/37 at 4:29 PM

## 2017-10-30 NOTE — PROGRESS NOTES
8%) x 1 month,  ~ 1 month  · Ideal Body Wt: 178 lb (80.7 kg), % Ideal Body 107%  · BMI Classification: BMI 25.0 - 29.9 Overweight  · Comparative Standards (Estimated Nutrition Needs):  · Estimated Daily Total Kcal: 5724-1156  · Estimated Daily Protein (g): 81-97    Estimated Intake vs Estimated Needs: Intake Less Than Needs    Nutrition Risk Level: Moderate    Nutrition Interventions:   Modify current diet, Continue current ONS (d/c Cardiac restriction, continue with DYS III, continue wtih frozne oral supplement)  Continued Inpatient Monitoring, Education declined    Nutrition Evaluation:   · Evaluation: No progress toward goals   · Goals: po > 75%, wt > 185#    · Monitoring: Meal Intake, Supplement Intake, Weight    See Adult Nutrition Doc Flowsheet for more detail.      Electronically signed by José Miguel Crawford RD, LD on 10/30/17 at 2:38 PM

## 2017-10-30 NOTE — PROGRESS NOTES
Physical Therapy  Facility/Department: [facility]  Rehabilitation Daily Treatment Note    NAME: Dwight Anand : 1934 (80 y.o.)  MRN: [id number]    Account: [de-identified]  Gender: male    Date of Service: 10/30/17    SUBJECTIVE:     Start of tx pain 0/10  End of tx pain 0/10  Pt states \"I forget a lot\"    OBJECTIVE:    Bed Mobility:   Rolling: Modified Independent  Supine to sit: Modified Independent  Sit to Supine: Modified Independent  Mild increased effort with sup to sit but pt able to complete without cues. Transfers:   Sit to stand: Supervision  Stand to sit: Supervision  Chair to/from bed: Stand by Assist with [place]olso Transfer: Not tested  Occasional cues to improve technique/safety. Gait/Ambulation:   Assistive Device: Rolling Walker  Assist Level: Stand by The Vital Sensors: 150 feet  Surface: carpet and linoleum/hardwood  Gait Deviations: Mild trunk flexion. Pt tends to hold breath. Frequent VCs required for breathing technique throughout ambulation. SPO2 92% post ambulation. Occasional cues for management of O2 tubing. Stair Negotiation:      4 -6\" x1     Rails: 2     Assist:Supervision     Non - reciprocal    PLAN:   Continue per POC      Comments:     Pt able to ambulate without LOB but occasionally challenged with managing O2 tubing requiring cues. Requires frequent cues for breathing during activity to maintain SPO2 levels. Falls Safety Armband Present [x] Yes  [] No    Safety/Judgment:    Occasional cues to improve safety. Minutes:30      Transfer/Bed mobility training: 10      Gait trainin      Neuro re education:      Therapeutic ex:     Therapy Time:    Individual   Time In 1000   Time Out 1030   Minutes 30       Electronically signed by Perlie Castleman, PTA on 10/30/2017 at 11:42 AM

## 2017-10-30 NOTE — PROGRESS NOTES
Rolling Walker  Other Apparatus: O2 (2.5L)  Assistance: Stand by assistance  Quality of Gait: flexed trunk, wbos, iglesia flexed knees, decreased heel strike iglesia  Distance: 130'  Comments: tolerated longer distance this pm but was fatigued, Stairs  # Steps : 8  Stairs Height: 6\"  Rails: Bilateral  Device: No Device  Assistance: Stand by assistance  Comment: non recip    FIMS: Bed, Chair, Wheel Chair: 5 - Requires setup/supervision/cues  Walk: 2 - Maximal Assistance Requires up to Maximal Assistance AND requires assistance of one person to walk/operate wheelchair between  feet (Patient performs 25-49% of locomotion effort or goes between  feet)  Distance Walked: 125'  Stairs: 2- Maximal Assistance Performs 25-49% of the effort to go up and down 4 to 6 stairs and requires the assistance of one person only,  , Assessment: pt req'd cueing to stay within maribeth of ww when changing threshold into clinic, flexed fwd posture    Occupational therapy: FIMS:  Eatin - Feeds self with setup/supervision/cues and/or requires only setup/supervision/cues to perform tube feedings  Groomin - Did not occur  Bathin - Did not occur  Dressing-Upper: 0 - Did not occur  Dressing-Lower: 0 - Did not occur  Toiletin - Requires setup/supervision/cues  Toilet Transfer: 5 - Requires setup/supervision/cues  Tub Transfer: 0 - Activity does not occur  Shower Transfer: 5 - Supervision, set-up, cues,  ,      Speech therapy: FIMS: Comprehension: 4 - Patient understands basic needs 75-90%+ of the time  Expression: 4 - Expresses basic ideas/needs 75-90%+ of the time  Social Interaction: 4 - Patient appropriate 75-90%+ of the time  Problem Solving: 3 - Patient solves simple/routine tasks 50%-74%  Memory: 2 - Patient remembers 25%-49% of the time      Lab/X-ray studies reviewed, analyzed and discussed with patient and staff:   No results found for this or any previous visit (from the past 24 hour(s)).     Previous extensive, complex labs, notes and diagnostics reviewed and analyzed. ALLERGIES:    Allergies as of 10/20/2017 - Review Complete 10/20/2017   Allergen Reaction Noted    Adhesive tape Rash 12/29/2014    Brimonidine  09/28/2016    Niacin and related  09/28/2016    Promethazine hcl Other (See Comments) 08/29/2017    Codeine Anxiety and Other (See Comments) 07/21/2015    Phenergan [promethazine] Anxiety 07/21/2015    Prednisone Anxiety 07/21/2015      (please also verify by checking MAR)    Today I evaluated this patient for periodic reassessment of medical and functional status. The patient was discussed in detail at the treatment team meeting focusing on current medical issues, progress in therapies, social issues, psychological issues, barriers to progress and strategies to address these barriers, and discharge planning. See the hand written addendum to rehab progress note. The patient continues to be high risk for future disability and their medical and rehabilitation prognosis continue to be good and therefore, we will continue the patient's rehabilitation course as planned. The patient's tentative discharge date was set. Patient and family education was discussed. The patient was made aware of the team discussion regarding their progress. Complex Physical Medicine & Rehab Issues Assess & Plan:   1. Severe abnormality of gait and mobility and impaired self-care and ADL's secondary to closed head injury. Functional and medical status reassessed regarding patients ability to participate in therapies and patient found to be able to participate in acute intensive comprehensive inpatient rehabilitation program including PT/OT to improve balance, ambulation, ADLs, and to improve the P/AROM. Therapeutic modifications regarding activities in therapies, place, amount of time per day and intensity of therapy made daily.   In bed therapies or bedside therapies prn.   2. Bowel and Bladder dysfunction:  frequent toileting, ambulate to bathroom with assistance, check post void residuals. Check for C.difficile x1 if >2 loose stools in 24 hours, continue bowel & bladder program.  Monitor bowel and bladder function. Lactinex 2 PO every AC. MOM prn, Brown Bomb prn, Glycerin suppository prn, enema prn. 3. Moderate generalized OA pain: reassess pain every shift and prior to and after each therapy session, give prn  Tylenol, modalities prn in therapy, Lidoderm, K-pad prn. OARRS reviewed, rare use of cough syrup from primary care physician. 4. Skin breakdown risk:  Add egg crate mattress continue pressure relief program.  Daily skin exams and reports from nursing. 5. Nutritional and hydration deficiency:  continue to monitor I&Os, calorie counts prn, dietary consult prn. Add vitamin B12 vitamin D and CoQ10  6. Acute episodic insomnia with situational adjustment disorder:  Seroquel, prn Ambien, monitor for day time sedation. 7. Falls risk elevated:  patient to use call light to get nursing assistance to get up, bed and chair alarm. 8. Elevated DVT risk: progressive activities in PT, continue prophylaxis MC hose, elevation and Lovenox (also on ASA). 9. Complex discharge planning:  Discharge date is set for 11/01/17 to home with his wife and home health PT, OT, RN, aide and  versus assisted living. He will need a hospital bed at discharge in order to elevate the head of bed greater than 30 degrees due to severe COPD, he requires positioning of the body in ways not feasible with an ordinary bed in order to alleviate his pain, he requires different bed height to permit transfers to standing position and he also requires frequent changes in body position due to his pain and breathing issues. Patient and family education is in progress. The patient is to follow-up with their family physician after discharge. Complex Active General Medical Issues that complicate care Assess & Plan:     1.  Principal Problem:

## 2017-10-30 NOTE — PROGRESS NOTES
Tia Bagley. is a 80 y.o. male patient. Admitted to rehab due to falls, gait abnormality, closed head injury. We've been consulted to address his medical issues and resume home medications.     Current Facility-Administered Medications   Medication Dose Route Frequency Provider Last Rate Last Dose    ipratropium-albuterol (DUONEB) nebulizer solution 1 ampule  1 ampule Inhalation 4x daily Renay Scullin, DO   1 ampule at 10/30/17 0447    mometasone-formoterol (DULERA) 200-5 MCG/ACT inhaler 2 puff  2 puff Inhalation BID Renay Scullin, DO   2 puff at 10/30/17 0447    oxymetazoline (AFRIN) 0.05 % nasal spray 2 spray  2 spray Each Nare BID Renay Scullin, DO   2 spray at 10/29/17 1923    sodium chloride (OCEAN, BABY AYR) 0.65 % nasal spray 1 spray  1 spray Each Nare Q4H PRN Renay Scullin, DO   1 spray at 10/28/17 0904    QUEtiapine (SEROQUEL) tablet 12.5 mg  12.5 mg Oral Nightly PRN Alyce Richard MD   12.5 mg at 10/28/17 1205    acetaminophen (TYLENOL) tablet 650 mg  650 mg Oral Q4H PRN Harjeet Branham MD        docusate sodium (COLACE) capsule 100 mg  100 mg Oral BID Harjeet Branham MD   100 mg at 10/29/17 1922    enoxaparin (LOVENOX) injection 40 mg  40 mg Subcutaneous Daily Harjeet Branham MD   40 mg at 10/29/17 1922    magnesium hydroxide (MILK OF MAGNESIA) 400 MG/5ML suspension 30 mL  30 mL Oral Daily PRN Harjeet Branham MD        albuterol (PROVENTIL) nebulizer solution 2.5 mg  2.5 mg Nebulization Q2H PRN Harjeet Branham MD        allopurinol (ZYLOPRIM) tablet 100 mg  100 mg Oral Daily Harjeet Branham MD   100 mg at 10/29/17 0583    amoxicillin-clavulanate (AUGMENTIN) 875-125 MG per tablet 1 tablet  1 tablet Oral BID Harjeet Branham MD   1 tablet at 10/29/17 1921    antioxidant multivitamin (OCUVITE) tablet  1 tablet Oral BID Harjeet Branham MD   1 tablet at 10/29/17 1921    artificial tears ophthalmic solution 1 drop  1 drop Both Eyes 4x Daily PRN Harjeet Branham,

## 2017-10-30 NOTE — FLOWSHEET NOTE
Pt pulse is 40. Pt is asymptomatic with low heart rate. Pt was resting in chair when pulse was taken. Will continue to monitor.

## 2017-10-30 NOTE — PLAN OF CARE
Problem: Musculor/Skeletal Functional Status  Goal: Absence of falls  Outcome: Met This Shift  Patient remained free of falls this shift. Problem: Falls - Risk of  Goal: Absence of falls  Outcome: Met This Shift  Patient remained free of falls this shift.

## 2017-10-30 NOTE — PROGRESS NOTES
Physical Therapy  Facility/Department: [facility]  Rehabilitation Daily Treatment Note    NAME: Tatiana Swann. : 1934 (80 y.o.)  MRN: [id number]    Account: [de-identified]  Gender: male    Date of Service: 10/30/17    Prior to session nsg aid taking vitals. /44 HR 33 BPM.  RN notified and took HR manually 40 BPM.  RN assessed pt and states he is ok to attend therapy. Pt states he feels fine. HR monitored throughout treatment. 37-40 BPM with irregular beats. SUBJECTIVE: Pt's wife previously stated pt uses rollator outside of home. Start of tx pain 0/10    End of tx pain 0/10    OBJECTIVE:    Bed Mobility:   NT    Transfers:   Sit to stand: Supervision  Stand to sit: Supervision  Chair to/from bed: Not tested  Car Transfer: Not tested    Gait/Ambulation:   Assistive Device: rollator  Assist Level: Stand by Assist  Distance: 150 feet  Surface: carpet and linoleum/hardwood  Gait Deviations: VCs for posture and to ambulate closer to rollator. No LOB and good control. Pt locks/unlocks breaks. VCs for management of O2 tubing. Exercise: The below exercises were completed to facilitate strength, motor control   and muscular endurance. Seated and standing postural ex    Neuromuscular Re-Education/Balance:    the following techniques and tasks were performed in therapy session to facilitate normalized motor control, balance reactions and movement patterns   Maneuvering around obstacles focusing on management of O2 tubing. Requires cues. PLAN:   Continue per POC      Comments:    Pt cont to have difficulty to manage O2 tubing Indep. Requires cues.         Falls Safety Armband Present: [x] Yes  [] No             Minutes:30      Transfer/Bed mobility training:       Gait training:10      Neuro re education:10      Therapeutic ex:10    Therapy Time:    Individual   Time In 1300   Time Out 1330   Minutes 30       Electronically signed by Pk Colon PTA on 10/30/2017 at 1:14 PM

## 2017-10-30 NOTE — PLAN OF CARE
Problem: Nutrition  Goal: Optimal nutrition therapy  Outcome: Ongoing  Nutrition Problem:  Moderate malnutrition, in context of chronic illness  Intervention: Food and/or Nutrient Delivery: Modify current diet, Continue current ONS (d/c Cardiac restriction, continue with DYS III, continue wtih frozne oral supplement)  Nutritional Goals: po > 75%, wt > 185#

## 2017-10-31 NOTE — PROGRESS NOTES
Occupational Therapy  Facility/Department: [facility]  Daily Treatment Note  NAME: Merrill Sherman. : 1934  MRN: [id number]    Date of Service: 10/31/2017    Patient Diagnosis(es): There were no encounter diagnoses. has a past medical history of Abnormality of gait and mobility; Acid reflux; Acquired cyst of kidney; Alzheimer disease; Arthritis; Basal cell carcinoma of lip; Basal cell papilloma; CAD (coronary artery disease); Cancer ([facility] Closed head injury; COPD (chronic obstructive pulmonary disease) ([facility] DD (diverticular disease); Dementia; Depression; Diverticulosis of intestine; Dyslipidemia; Gastroesophageal reflux disease; Glaucoma; H/O malignant neoplasm of skin; H/O surgical procedure; Hematuria, microscopic; History of surgical procedure; History of tobacco use; HTN (hypertension); Impaired mobility and activities of daily living; Kidney stone; Macular degeneration; MI (myocardial infarction); Neuromuscular disorder ([facility] NSVT (nonsustained ventricular tachycardia) ([facility] Obstructive apnea; Orthostasis; Pneumonia; and Syncope.   has a past surgical history that includes Upper gastrointestinal endoscopy; Cholecystectomy, laparoscopic (3/13/03); Kidney stone surgery (several); Colonoscopy (10/6/06); Cystoscopy (); Skin cancer excision (06); Lung biopsy (Right, 4/13/10); Coronary angioplasty with stent (3/2012); other surgical history; Appendectomy; skin biopsy; and vascular surgery. Restrictions  Restrictions/Precautions  Restrictions/Precautions: Fall Risk  Subjective   General  Chart Reviewed: Yes  Patient assessed for rehabilitation services?: Yes  Family / Caregiver Present: No  Referring Practitioner: Dr. Angeli Orta    Diagnosis: Closed head injury s/p fall   Subjective  Subjective: Pt questionable historian.    Pre Treatment Pain Screening  Pain at present: 0  Scale Used: Numeric Score  Intervention List: Patient able to continue with treatment  Pain

## 2017-10-31 NOTE — PROGRESS NOTES
Occupational Therapy  Facility/Department: [facility]  Daily Treatment Note  NAME: Carson Bennett. : 1934  MRN: [id number]    Date of Service: 10/31/2017    Patient Diagnosis(es): There were no encounter diagnoses. has a past medical history of Abnormality of gait and mobility; Acid reflux; Acquired cyst of kidney; Alzheimer disease; Arthritis; Basal cell carcinoma of lip; Basal cell papilloma; CAD (coronary artery disease); Cancer ([facility] Closed head injury; COPD (chronic obstructive pulmonary disease) ([facility] DD (diverticular disease); Dementia; Depression; Diverticulosis of intestine; Dyslipidemia; Gastroesophageal reflux disease; Glaucoma; H/O malignant neoplasm of skin; H/O surgical procedure; Hematuria, microscopic; History of surgical procedure; History of tobacco use; HTN (hypertension); Impaired mobility and activities of daily living; Kidney stone; Macular degeneration; MI (myocardial infarction); Neuromuscular disorder ([facility] NSVT (nonsustained ventricular tachycardia) ([facility] Obstructive apnea; Orthostasis; Pneumonia; and Syncope.   has a past surgical history that includes Upper gastrointestinal endoscopy; Cholecystectomy, laparoscopic (3/13/03); Kidney stone surgery (several); Colonoscopy (10/6/06); Cystoscopy (); Skin cancer excision (06); Lung biopsy (Right, 4/13/10); Coronary angioplasty with stent (3/2012); other surgical history; Appendectomy; skin biopsy; and vascular surgery. Restrictions  Restrictions/Precautions  Restrictions/Precautions: Fall Risk  Subjective Pt reports 0/10 pain at end of session. General  Chart Reviewed: Yes  Patient assessed for rehabilitation services?: Yes  Family / Caregiver Present: No  Referring Practitioner: Dr. Liz Sin    Diagnosis: Closed head injury s/p fall   Subjective  Subjective: Pt questionable historian.        Orientation  Orientation  Overall Orientation Status: Within Functional Limits  Objective  Pt completed AM ADL routine with functional levels as stated below. ADL  Feeding: Modified independent   Grooming: Minimal assistance  UE Bathing: Supervision  LE Bathing: Supervision  UE Dressing: Stand by assistance  LE Dressing: Stand by assistance  Toileting: Independent        Toilet Transfers  Toilet - Technique: Ambulating  Equipment Used: Grab bars  Toilet Transfer: Supervision  Shower Transfers  Shower - Transfer From: [facility] Se - Transfer Type: To and From  Shower - Transfer To: Shower seat with back  Shower - Technique: Ambulating  Shower Transfers: Supervision      Pt with verbal cues PRN secondary to decreased STM for task completion and thoroughness. Assessment Pt has achieved highest level for ADL independence at this time. Activity Tolerance  Activity Tolerance: Patient Tolerated treatment well  Safety Devices  Safety Devices in place: Yes  Type of devices: All fall risk precautions in place       Discharge Recommendations:  Home with Home health OT, 24 hour supervision or assist     Plan   Plan  Times per week: 5-7x/wk, 15-90 mins  Plan weeks: 2-3  Current Treatment Recommendations: Strengthening, Balance Training, Functional Mobility Training, Endurance Training, Cognitive Reorientation, Self-Care / ADL, Cognitive/Perceptual Training  Plan Comment: Continue OT POC  G-Code     OutComes Score                                             Goals  Patient Goals   Patient goals : To go home.         Therapy Time   Individual Concurrent Group Co-treatment   Time In 0830         Time Out 0930         Minutes 61                 HIRAM Burns/L Electronically signed by HIRAM Choe/L on 28/51/57 at 4:15 PM

## 2017-10-31 NOTE — PROGRESS NOTES
problems.     Rehabilitation:  Physical therapy: FIMS:  Bed Mobility:      Transfers: Sit to Stand: Contact guard assistance  Stand to sit: Contact guard assistance, Ambulation 1  Surface: carpet, level tile  Device: Rolling Walker  Other Apparatus: O2 (2.5L)  Assistance: Stand by assistance  Quality of Gait: flexed trunk, wbos, iglesia flexed knees, decreased heel strike iglesia  Distance: 130'  Comments: tolerated longer distance this pm but was fatigued, Stairs  # Steps : 8  Stairs Height: 6\"  Rails: Bilateral  Device: No Device  Assistance: Stand by assistance  Comment: non recip    FIMS: Bed, Chair, Wheel Chair: 5 - Requires setup/supervision/cues  Walk: 5 - Supervision Requires standby supervision or cuing to walk/operate wheelchair at least 150 feet  Distance Walked: 150ft  Stairs: 2- Maximal Assistance Performs 25-49% of the effort to go up and down 4 to 6 stairs and requires the assistance of one person only,  , Assessment: pt req'd cueing to stay within maribeth of ww when changing threshold into clinic, flexed fwd posture    Occupational therapy: FIMS:  Eatin - Feeds self with setup/supervision/cues and/or requires only setup/supervision/cues to perform tube feedings  Groomin - Did not occur  Bathin - Did not occur  Dressing-Upper: 0 - Did not occur  Dressing-Lower: 0 - Did not occur  Toiletin - Requires setup/supervision/cues  Toilet Transfer: 5 - Requires setup/supervision/cues  Tub Transfer: 0 - Activity does not occur  Shower Transfer: 5 - Supervision, set-up, cues,  ,      Speech therapy: FIMS: Comprehension: 3 - Patient understands basic needs 50-74% of the time  Expression: 4 - Expresses basic ideas/needs 75-90%+ of the time  Social Interaction: 6 - Patient requires medication for mood and/or effect  Problem Solvin - Patient solves simple/routine tasks 25%-49%  Memory: 2 - Patient remembers 25%-49% of the time      Lab/X-ray studies reviewed, analyzed and discussed with patient and staff:   No results found for this or any previous visit (from the past 24 hour(s)). Previous extensive, complex labs, notes and diagnostics reviewed and analyzed. ALLERGIES:    Allergies as of 10/20/2017 - Review Complete 10/20/2017   Allergen Reaction Noted    Adhesive tape Rash 12/29/2014    Brimonidine  09/28/2016    Niacin and related  09/28/2016    Promethazine hcl Other (See Comments) 08/29/2017    Codeine Anxiety and Other (See Comments) 07/21/2015    Phenergan [promethazine] Anxiety 07/21/2015    Prednisone Anxiety 07/21/2015      (please also verify by checking MAR)    Yesterday I evaluated this patient for periodic reassessment of medical and functional status. The patient was discussed in detail at the treatment team meeting focusing on current medical issues, progress in therapies, social issues, psychological issues, barriers to progress and strategies to address these barriers, and discharge planning. See the hand written addendum to rehab progress note. The patient continues to be high risk for future disability and their medical and rehabilitation prognosis continue to be good and therefore, we will continue the patient's rehabilitation course as planned. The patient's tentative discharge date was set. Patient and family education was discussed. The patient was made aware of the team discussion regarding their progress. Discharge plans were discussed along with barriers to progress and strategies to address these barriers, patient encouraged to continue to discuss discharge plans with . Complex Physical Medicine & Rehab Issues Assess & Plan:   1. Severe abnormality of gait and mobility and impaired self-care and ADL's secondary to closed head injury.   Functional and medical status reassessed regarding patients ability to participate in therapies and patient found to be able to participate in acute intensive comprehensive inpatient rehabilitation program transfers to standing position and he also requires frequent changes in body position due to his pain and breathing issues. Patient and family education is in progress. The patient is to follow-up with their family physician after discharge. Complex Active General Medical Issues that complicate care Assess & Plan:     1. Principal Problem:    Abnormality of gait and mobility due to closed head injury s/p a fall, [facility] admit 10/20/17  Active Problems:  1. Closed head injury   Metabolic encephalopathy-add speech therapy, PT and OT monitor closely well on blood thinners-limit toxic medications provide hydration  2. CAD (coronary artery disease) s/p ALIYAH to mid Circ. 3/2012, HTN (hypertension), dyslipidemia - continue blood pressure checks, adjust/add medications (Tenormin, Nitrostat, Zocor). 3.   Depression - emotional support, adjust/add medications (Seroquel, Remeron, Zoloft). 4.   Dementia - Namenda. 5.   Macular degeneration, glaucoma. - Xalatan, Artificial Tears. 6.  Acute Pneumonia - still on PO Augmentin. Check modified barium swallow patient is okay to be in soft with thins afar consult speech therapy. 7.   Acid reflux - Protonix, elevate head of bed. 8.    Chronic obstructive pulmonary disease with acute exacerbation and shortness of breath (relieved with Dulera) - Pulse oximeter checks, monitor vital signs, 3 liters oxygen prn. Proventil, Duoneb, Dulera. Continue Afrin and humidity to oxygen. 9.   Obstructive sleep apnea syndrome - O2 at night elevate head of bed  10. Oropharyngeal dysphagia - SLP, soft diet with thin liquids. Sp  Modified barium swallow - no changes. This note transcribed by Misael Montgomery on 10/31/17 at 10:49 a.m.         Miguel Curran D.O., PM&R     Attending    [facility] Amanda Peter

## 2017-10-31 NOTE — PROGRESS NOTES
[facility]  Speech Language/Pathology  Rehab Daily Note                  Huma Soto.  10/31/2017    Rehab Dx/Hx: Closed injury of head, initial encounter [K75.64SV]  CHI (closed head injury), initial encounter [S09.90XA]    Precautions: falls    ST Dx: [] Aphasia  [] Dysarthria  [] Apraxia   [x] Oropharyngeal dysphagia              [x] Cognitive Impairment  [] Other:     Date of Admit: 10/20/2017  Room #: D354/G751-39    Time in: 8271     Time out: 1025  Swallow 8528-6347  Cognition [id number]    Subjective:  Behavior: [x] Alert  [x] Cooperative  [x]  Pleasant  [] Confused  [] Agitated                                          [] Uncooperative  [] Distractible [] Motivated  [] Self-Limiting [] Anxious          [] Other:    Endurance:  [x] Adequate for participation in SLP sessions  [] Reduced overall              [] Lethargic  [] Other:              Interventions used this date:  [] Speech Treatment       [] Expressive Language  [] Receptive Language   [] Dysphagia Treatment   [x] Cognitive Skill Miles  [] Oral Motor  [] Voice Treatment       []  AAC     [] ESTIM  [] Speech production       [x] Therapeutic Meal Monitor               [x] Instruction in compensatory strategies       Objective/Assessment:    Pt's vocal quality was noted to be somewhat raspy and intermittently gurgly sounding at baseline (without any PO intake). He was able to produce a throat clear and reswallow, though no change in vocal quality was observed. No change in vocal quality noted after PO presentations of thin liquids. No overt s/s of aspiration observed. Pt and his daughter were both educated on s/s of aspiration. Both verbalized good understanding. Orientation: Pt oriented to self only. For place, pt stated, \"it's a workout place of some sort\". Pt required max cues for [facility], city, month, and year. Pt independently stated it was 2007.  Once cued to the date, pt was able to independently state it was \"Trick or Treat\"  LTM: Pt required cues for his address. Unable to recall or recognize phone number. Name 5 items in a concrete category: 5/5, 5/5, 5/5, 2/5, 1/5, 3/5  Pt's daughter was present for tx session and confirmed that this is pt's baseline level of cognitive function. Treatment/Activity Tolerance:     [x]  Patient tolerated treatment well []  Patient limited by fatique    []  Patient limited by pain  []  Patient limited by other medical complications   []  Other:     Plan: []  Continue per plan of care []  Alter current plan (see comments)    []  Plan of care initiated []  Hold pending MD visit [x]  Discharge    Pain:  [x] Pt demonstrated no s/s of pain during this visit. Patient/ Caregiver Education:  [x] Patient/Caregiver Educated on session and progression towards goals. [x] Patient/Caregiver stated verbal understanding of directions. [] Reinforcement needed;  [] patient   [] family    Safety Devices:  [] Call light within reach  [x] Chair alarm activated  [] Bed alarm activated  [] Other:    Comprehension          Expression   []7 - Independent   []7 - Indpendent   []6 - Modified Independent  []6 - Modified Independent   []5 - Supervision   []5 - Supervision   []4 - Min Assist   [x]4 - Min Assist   [x]3 - Mod Assist   []3 - Mod Assist   []2 - Max Assist   []2 - Max Assist   []1 - Dependent   []1 - Dependent    Problem Solving        Memory   []7 - Independent   []7 - Independent   []6 - Modified Independent  []6 - Modified Independent   []5 - Supervision   []5 - Supervision   []4 - Min Assist   []4 - Min Assist   []3 - Mod Assist   []3 - Mod Assist   [x]2 - Max Assist   [x]2 - Max Assist   []1 - Dependent   [] 1 -Dependent    David GARCIA, CCC-SLP 10/31/17

## 2017-10-31 NOTE — HOME CARE
Met with patient he is current with [facility] and is agreeable to have services continued. Called and spoke to wife she is agreeable to [facility] to continue when discharged.

## 2017-10-31 NOTE — PROGRESS NOTES
Speech Language Pathology  Speech & Language Pathology Discharge Report  Date: 10/31/2017  Patient: Manuel Jerome. : 1934    Date of Last Treatment Session: 10/31/2017  Past Medical History:   Diagnosis Date    Abnormality of gait and mobility     Acid reflux 2014    Acquired cyst of kidney 2007    Alzheimer disease     Arthritis     Basal cell carcinoma of lip 2005    Overview:  left upper lip nostril Overview:  left upper lip nostril    Basal cell papilloma 10/20/2009    CAD (coronary artery disease)     Cancer (HCC)     HX skin    Closed head injury     COPD (chronic obstructive [facility] disease) ([facility]     DD (diverticular disease) 2014    Dementia     Depression     Diverticulosis of intestine 2014    Dyslipidemia     Gastroesophageal reflux disease 2014    Glaucoma 2014    H/O malignant neoplasm of skin 10/20/2009    H/O surgical procedure 2014    Overview:  RUQ removed 4/15/10 lung ca Overview:  RUQ removed 4/15/10 lung ca    Hematuria, microscopic 2009    History of surgical procedure 2014    Overview:  RUQ removed 4/15/10 lung ca     History of tobacco use 2009    HTN (hypertension)     Impaired mobility and activities of daily living     Kidney stone     Macular degeneration     MI (myocardial infarction)     Neuromuscular disorder ([facility]     NSVT (nonsustained ventricular tachycardia) ([facility] 3/29/2012    Obstructive apnea 2014    Overview:  Non compliant w/ CPAP, uses 2.5 L NC with sleep     Orthostasis     Pneumonia     Syncope 2012       Status at initiation of therapy: Pt was seen for an initial speech-language evaluation on 10/22/2017 which revealed \"severe cognitive deficits characterized by decreased attention, orientation, problem solving, STM, LTM, and executive functioning.  Pt also presents with mild-moderate language deficits characterized by decreased comprehension of 2+ directions and decreased word finding at the conversational level. The aforementioned deficits negatively affect pt's level of safety and independence. \" An initial bedside swallow evaluation complete on 10/22/2017 revealed \"mild oral phase dysphagia with suspected pharyngeal phase dysphagia. Rec: MBS to more objectively assess swallowing function and anatomy. \"    Status at end of therapy: Pt participated in a MBS on 10/23/2017 which revealed mild oral phase dysphagia and rec: diet of soft consistencies (NDD level III) with thin liquids. Pt continues to exhibit severe cognitive deficits negatively affecting his independence with ADLs and IADLs. Pt is discharged to the home environment with his spouse and hired help available to assist with ADLs as needed secondary to severity of cognitive deficits. Pt's daughter was present for tx and confirmed that this is pt's baseline level of cognitive function. No further S.T. Intervention is warranted at this time, as maximum rehabilitation potential has been achieved. Treatment Area(s):  Cognition and Swallow    Participation in Treatment (at discharge): Cooperative    Functional Status at time of Discharge:    · Cognition: Patient demonstrates maximal cognitive deficits. · Communication: Patient demonstrates moderate communication deficits. · Language: Patient demonstrates moderate language deficits. · Motor Speech: Patient demonstrates minimal motor speech deficits. · Swallow: Patient demonstrates minimal dysphagia.                      Clinical Bedside assessment was completed on 10/22/2017                       Instrumental assessment was completed on 10/23/2017                                Swallowing Guidelines: Patient tolerating  Soft consistencies (NDD level III) diet with thin liquids    Patient is discharged to Home with family and hired help to [address] [facility]

## 2017-11-01 NOTE — PROGRESS NOTES
chronic)  [x]   Severe or Chronic w/ Exacerbation  []     Surgical Status No [x]   Surgeries     General []   Surgery Lower []   Abdominal Thoracic or []   Upper Abdominal Thoracic with  PulmonaryDisorder  []     Chest X-ray Clear/Not  Ordered     [x]  Chronic Changes  Results Pending  []  Infiltrates, atelectasis, pleural effusion, or edema  []  Infiltrates in more than one lobe []  Infiltrate + Atelectasis, &/or pleural effusion  []    Respiratory Pattern Regular,  RR = 12-20 [x]  Increased,  RR = 21-25 []  LAURA, irregular,  or RR = 26-30 []  Decreased FEV1  or RR = 31-35 []  Severe SOB, use  of accessory muscles, or RR ? 35  []    Mental Status Alert, oriented,  Cooperative [x]  Confused but Follows commands []  Lethargic or unable to follow commands []  Obtunded  []  Comatose  []    Breath Sounds Clear to  auscultation  [x]  Decreased unilaterally or  in bases only []  Decreased  bilaterally  []  Crackles or intermittent wheezes []  Wheezes []    Cough Strong, Spontan., & nonproductive [x]  Strong,  spontaneous, &  productive []  Weak,  Nonproductive []  Weak, productive or  with wheezes []  No spontaneous  cough or may require suctioning []    Level of Activity Ambulatory []  Ambulatory w/ Assist  [x]  Non-ambulatory []  Paraplegic []  Quadriplegic []    Total    Score:___4____     Triage Score:____5____      Tri       Triage:     1. (>20) Freq: Q3    2. (16-20) Freq: Q4   3. (11-15) Freq: QID & Albuterol Q2 PRN    4. (6-10) Freq: TID & Albuterol Q2 PRN    5. (0-5) Freq Q4prn

## 2017-11-01 NOTE — PROGRESS NOTES
Facility/Department: [facility]  Physical Therapy Acute Rehab Discharge Summary  Room: [place] [address]    NAME: Dwight Naylor.   : 1934  MRN: [id number]    Admission Date: 10/20/2017  6:43 PM  Discharge Date: 17    Rehab Diagnosis(es):    Patient Active Problem List    Diagnosis Date Noted    Gait abnormality 10/20/2017    COPD (chronic obstructive [facility] disease) ([facility]     Abnormality of gait and mobility due to closed head injury s/p a fall, [facility] admit 10/20/17     Closed head injury     Metabolic encephalopathy     Dementia     Alzheimer disease     Macular degeneration     Pneumonia     Acid reflux 2014    Glaucoma 2014    Chronic obstructive pulmonary disease with acute exacerbation ([facility] 2014    Obstructive sleep apnea syndrome 2014    CAD (coronary artery disease) s/p ALIYAH to mid Circ. 3/2012 2012    HTN (hypertension) 2012    Depression 2012    Dyslipidemia 2012    Basal cell papilloma 10/20/2009    Basal cell carcinoma of lip 2005       Past Medical History:   Diagnosis Date    Abnormality of gait and mobility     Acid reflux 2014    Acquired cyst of kidney 2007    Alzheimer disease     Arthritis     Basal cell carcinoma of lip 2005    Overview:  left upper lip nostril Overview:  left upper lip nostril    Basal cell papilloma 10/20/2009    CAD (coronary artery disease)     Cancer (HCC)     HX skin    Closed head injury     COPD (chronic obstructive pulmonary disease) ([facility]     DD (diverticular disease) 2014    Dementia     Depression     Diverticulosis of intestine 2014    Dyslipidemia     Gastroesophageal reflux disease 2014    Glaucoma 2014    H/O malignant neoplasm of skin 10/20/2009    H/O surgical procedure 2014    Overview:  RUQ removed 4/15/10 lung ca Overview:  RUQ removed 4/15/10 lung ca    Hematuria, microscopic 2009    History

## 2017-11-01 NOTE — CARE COORDINATION
Facility/Department: [facility] CASE MANAGEMENT    NAME: Sharlene Hooper. : 1934  MRN: [id number]  [id number]      Social/Functional History  Social/Functional History  Lives With: Spouse (and grand daughter )  Type of Home: House  Home Layout: Two level  Home Access: Stairs to enter without rails  Entrance Stairs - Number of Steps: 1  Bathroom Shower/Tub: Walk-in shower  Bathroom Toilet: Standard  Bathroom Equipment: Grab bars in shower, Shower chair  Bathroom Accessibility: Accessible  Home Equipment: Rolling walker  Receives Help From: Family, Personal care attendant  ADL Assistance: Needs assistance  Bath: Maximum assistance (Pt reported Assistant 2x/wk for bathing and dressing. Stated assistanct was helpine with all bathing. )  Dressing: Maximum assistance (Pt stated assistant assisted with UB and LB dressing. )  Homemaking Assistance: Needs assistance (Pt reported wife does all cooking and cleaning. )  Homemaking Responsibilities: No  Ambulation Assistance: Independent (per pt report)  Transfer Assistance: Independent (per pt report)  Active : No  Occupation: Retired  Type of occupation: [employer]   Leisure & Hobbies: collecting sports cards, exercising   Additional Comments: Pt reports feeling a little under his baseline function at this time. Scheduled a  folllow up appointment for the patient with dr Serrano. Spoke with the patient, his wife and his daughter. The [facility]. She requested a different company yesterday. I called [address] per patient request they received all of the orders yesterday. I called jayna this morning and they will deliver equipment today. Hosp bed, Shower chair, and transfer bench (from [facility]). The patient is on a new dose of 02 3 liters home was  Liters, nocturnal pulse ox done. Home o2 eval ordered  For today.     Discharge planned for home
on 10/24/17 at 2:32 PM    -

## 2017-11-01 NOTE — PROGRESS NOTES
Patient discharged home with wife. Medications and discharge instructions explained to family, voiced understanding. Sent with belongings, eyeglasses and dentures. Family brought in portable 02 tank for patient to ride home with.

## 2017-11-01 NOTE — PROGRESS NOTES
PRN Gregorio Arroyo MD        aspirin chewable tablet 81 mg  81 mg Oral Daily Gregorio Arroyo MD   81 mg at 10/31/17 0834    atenolol (TENORMIN) tablet 25 mg  25 mg Oral Daily Gregorio Arroyo MD   25 mg at 10/31/17 0834    donepezil (ARICEPT) tablet 10 mg  10 mg Oral Nightly Gregorio Arroyo MD   10 mg at 10/31/17 2322    fish oil capsule 3,000 mg  3,000 mg Oral Nightly Gregorio Arroyo MD   3,000 mg at 10/31/17 2323    latanoprost (XALATAN) 0.005 % ophthalmic solution 1 drop  1 drop Both Eyes Nightly Gregorio Arroyo MD   1 drop at 10/31/17 2324    memantine (NAMENDA) tablet 5 mg  5 mg Oral BID Gregorio Arroyo MD   5 mg at 10/31/17 2322    mirtazapine (REMERON) tablet 30 mg  30 mg Oral Nightly Gregorio Arroyo MD   30 mg at 10/31/17 2321    nitroGLYCERIN (NITROSTAT) SL tablet 0.4 mg  0.4 mg Sublingual Q5 Min PRN Gregorio Arroyo MD        pantoprazole (PROTONIX) tablet 40 mg  40 mg Oral QAM AC Gregorio Arroyo MD   40 mg at 11/01/17 0602    potassium chloride (MICRO-K) extended release capsule 20 mEq  20 mEq Oral BID Gregorio Arroyo MD   20 mEq at 10/31/17 2323    sertraline (ZOLOFT) tablet 50 mg  50 mg Oral Daily Gregorio Arroyo MD   50 mg at 10/31/17 5301    simvastatin (ZOCOR) tablet 40 mg  40 mg Oral Nightly Gregorio Arroyo MD   40 mg at 10/31/17 2321     Allergies   Allergen Reactions    Adhesive Tape Rash     blistering    Brimonidine     Niacin And Related     Promethazine Hcl Other (See Comments)    Codeine Anxiety and Other (See Comments)     nightmares    Phenergan [Promethazine] Anxiety     Nightmares and hallucinations    Prednisone Anxiety     Principal Problem:    Abnormality of gait and mobility due to closed head injury s/p a fall, [facility] admit 10/20/17  Active Problems:    CAD (coronary artery disease) s/p ALIYAH to mid Circ. 3/2012    HTN (hypertension)    Depression    Dyslipidemia    Dementia    Macular degeneration    Pneumonia    Acid reflux    Glaucoma    Chronic

## 2017-11-01 NOTE — DISCHARGE SUMMARY
bar/walker/etc.)  Toilet Transfer: 6 - Independent with device (grab bar/walker/slide bar)  Tub Transfer: 0 - Activity does not occur  Shower Transfer: 5 - Supervision, set-up, cues,  ,      Speech therapy: FIMS: Comprehension: 4 - Patient understands basic needs 75-90%+ of the time  Expression: 5 - Expresses basic ideas/needs only (hungry/hot/pain)  Social Interaction: 7 - Patient has appropriate behavior/relations 100% of the time  Problem Solvin - Patient solves simple/routine tasks 75-90%+   Memory: 4 - Patient remembers 75-90%+ of the time      Lab/X-ray studies reviewed, analyzed and discussed with patient and staff:   No results found for this or any previous visit (from the past 24 hour(s)). Previous extensive, complex labs, notes and diagnostics reviewed and analyzed. ALLERGIES:    Allergies as of 10/20/2017 - Review Complete 10/20/2017   Allergen Reaction Noted    Adhesive tape Rash 2014    Brimonidine  2016    Niacin and related  2016    Promethazine hcl Other (See Comments) 2017    Codeine Anxiety and Other (See Comments) 2015    Phenergan [promethazine] Anxiety 2015    Prednisone Anxiety 2015      (please also verify by checking [facility])       Complex Physical Medicine & Rehab Issues Assess & Plan:   1. Severe abnormality of gait and mobility and impaired self-care and ADL's secondary to closed head injury. The patient has completed the acute rehabilitation program and is ready for discharge today to home with his wife at supervised level due to need for cueing with ADL's and safety, and home health PT, OT, RN, aide and .     He will need a hospital bed at discharge in order to elevate the head of bed greater than 30 degrees due to severe COPD, he requires positioning of the body in ways not feasible with an ordinary bed in order to alleviate his pain, he requires different bed height to permit transfers to standing position and he

## 2017-11-01 NOTE — PROGRESS NOTES
Patient resting  , On room air   Spo2 88%    Placed on o2 2lpm     Spo2  91%  Inc. To 3lpm   Spo2  96%

## 2017-12-22 NOTE — PROGRESS NOTES
Chief Complaint   Patient presents with    Coronary Artery Disease    Hypertension    Hyperlipidemia       Patient presents for follow up medical evaluation. Patient is followed on a regular basis by Dr. Flower Perdue DO. Pt denies chest pain, dyspnea, dyspnea on exertion, change in exercise capacity, fatigue, nausea, vomiting, diarrhea, constipation, motor weakness, insomnia, weight loss, syncope, dizziness, lightheadedness, palpitations, PND, orthopnea, or claudication. Patient with hx of syncope, NSVT in march 2012, s/p cath with ALIYAH to mid circ. No bleeding issues, compliant with meds. S/p normal ETHEL/PVR. 11-13-12: as above, patient was recently hospitalized for GIB, s/p negative workup. Compliant with meds especially plavix    4-23-13: as above, doing ok overall. Compliant with meds, no bleeding issues. Pt denies chest pain, dyspnea, dyspnea on exertion, change in exercise capacity, fatigue,  nausea, vomiting, diarrhea, constipation, motor weakness, insomnia, weight loss, syncope, dizziness, lightheadedness, palpitations, PND, orthopnea, or claudication. 4-22-14: as above, Pt denies chest pain, dyspnea, dyspnea on exertion, change in exercise capacity, fatigue,  nausea, vomiting, diarrhea, constipation, motor weakness, insomnia, weight loss, syncope, dizziness, lightheadedness, palpitations, PND, orthopnea, or claudication. No recent hospitalizations. No change in meds. No bleeding issues. We took him off of palvix back a year ago. 4-21-15: as above, Pt denies chest pain, dyspnea, dyspnea on exertion, change in exercise capacity, fatigue,  nausea, vomiting, diarrhea, constipation, motor weakness, insomnia, weight loss, syncope, dizziness, lightheadedness, palpitations, PND, orthopnea, or claudication. Does have issues with renal stones. No SL nitro use. No bleeding issues. 10-22-15: as above, doing ok from cardiac standpoint. No hospitalizations. No SL nitro use.  Pt denies chest pain, syncope, dizziness, lightheadedness, palpitations, PND, orthopnea, or claudication. No nitro use. BP and hr are good. CAD is stable. No LE discoloration or ulcers. No LE edema. No CHF type symptoms. Lipid profile is normal. Compliant with meds. Recent lab work looks good. Not able to tolerate CPAP.       12-22-17: does have some decline in his functional capacity. Had a UTI. Will be evaluated by palliative care today. He is very weak. Its had for him to walk much. On home O2. Not able to tolerate Cpap. Did have PNA a couple of months ago. Family states his HR has been labile. EKG in 10/2017 with SR. His atenolol was stopped. Pt denies chest pain, dyspnea,   nausea, vomiting, diarrhea, constipation, motor weakness, insomnia, weight loss, syncope, dizziness, lightheadedness, palpitations, PND, orthopnea. Compliant with meds.       Patient Active Problem List   Diagnosis    CAD (coronary artery disease) s/p ALIYAH to mid Circ. 3/2012    HTN (hypertension)    Depression    Dyslipidemia    Dementia    Alzheimer disease    Macular degeneration    Pneumonia    Acid reflux    Glaucoma    Chronic obstructive pulmonary disease with acute exacerbation (HCC)    Obstructive sleep apnea syndrome    Metabolic encephalopathy    Gait abnormality    Basal cell papilloma    Basal cell carcinoma of lip    COPD (chronic obstructive pulmonary disease) (HCC)    Abnormality of gait and mobility due to closed head injury s/p a fall, [facility] admit 10/20/17    Closed head injury       Current Outpatient Prescriptions   Medication Sig Dispense Refill    budesonide-formoterol (SYMBICORT) 160-4.5 MCG/ACT AERO Inhale 2 puffs into the lungs 2 times daily      acetaminophen (TYLENOL) 325 MG tablet Take 650 mg by mouth every 4 hours as needed for Pain      allopurinol (ZYLOPRIM) 100 MG tablet Take 100 mg by mouth daily      aspirin 81 MG tablet Take 81 mg by mouth daily      Cholecalciferol 2000 units TABS Take by mouth cardiovascular risk analysis. I have discussed the appropriate diet. The need for lifelong compliance in order to reduce risk is stressed. A regular exercise program is recommended to help achieve and maintain normal body weight, fitness and improve lipid balance. Continue medications at current dose    Patient was advised and encouraged to check blood pressure at home or at a pharmacy, maintain a logbook, and also call us back if blood pressure are above the target ranges or if it is low. Patient clearly understands and agrees to the instructions. We will need to continue to monitor muscle and liver enzymes, BUN, CR, and electrolytes. Details of medical condition explained and patient was warned about adverse consequences of uncontrolled medical conditions and possible side effects of prescribed medications. Patient was advised to go to the ER if he starts experiencing adverse effects of the medications. patient was instructed to call us back or go to nearby emergency room immediately if symptoms get worse or do not improve. Thank you for allowing me to participate in the care of your patient, please don't hesitate to contact me if you have any further questions.

## 2018-01-01 ENCOUNTER — TELEPHONE (OUTPATIENT)
Dept: PALLATIVE CARE | Age: 83
End: 2018-01-01

## 2018-01-01 ENCOUNTER — OFFICE VISIT (OUTPATIENT)
Dept: PALLATIVE CARE | Age: 83
End: 2018-01-01

## 2018-01-01 VITALS — RESPIRATION RATE: 16 BRPM | SYSTOLIC BLOOD PRESSURE: 100 MMHG | DIASTOLIC BLOOD PRESSURE: 60 MMHG

## 2018-01-01 DIAGNOSIS — R41.0 DELIRIUM: Primary | ICD-10-CM

## 2018-01-01 PROCEDURE — 99307 SBSQ NF CARE SF MDM 10: CPT | Performed by: CLINICAL NURSE SPECIALIST

## 2018-01-15 NOTE — TELEPHONE ENCOUNTER
Call back to April, who is meeting with hospice. Reviewed patient's condition. Noted I spoke with [facility] nurse, Mita Nguyen earlier today upon her receipt of referral order. No other questions from April. Reassurances and support provided.

## 2018-01-15 NOTE — PROGRESS NOTES
receiivng home health PT, OT and RN prior to admission. He has history of falls prior to admission. He was independent with mobility prior to admission. He required assistance with self care prior to admission. His goal is to get stronger and return home. Family History   Problem Relation Age of Onset    Family history unknown: Yes     Allergies   Allergen Reactions    Adhesive Tape Rash     blistering    Brimonidine     Niacin And Related     Promethazine Hcl Other (See Comments)    Codeine Anxiety and Other (See Comments)     nightmares    Phenergan [Promethazine] Anxiety     Nightmares and hallucinations    Prednisone Anxiety     Current Outpatient Prescriptions on File Prior to Visit   Medication Sig Dispense Refill    budesonide-formoterol (SYMBICORT) 160-4.5 MCG/ACT AERO Inhale 2 puffs into the lungs 2 times daily      acetaminophen (TYLENOL) 325 MG tablet Take 650 mg by mouth every 4 hours as needed for Pain      allopurinol (ZYLOPRIM) 100 MG tablet Take 100 mg by mouth daily      aspirin 81 MG tablet Take 81 mg by mouth daily      Cholecalciferol 2000 units TABS Take by mouth every morning      COENZYME Q-10 PO Take 100 mg by mouth 2 times daily       Cyanocobalamin ER 1000 MCG TBCR Take by mouth every morning      Iodoquinol-HC (DERMAZENE) 1 % CREA Apply topically 2 times daily      mometasone-formoterol (DULERA) 200-5 MCG/ACT inhaler Inhale 2 puffs into the lungs every morning      Omega-3 Fatty Acids (FISH OIL) 600 MG CAPS Take 3,000 mg by mouth nightly      hydrocortisone 2.5 % cream Apply topically 2 times daily Apply topically 2 times daily.       ipratropium-albuterol (DUONEB) 0.5-2.5 (3) MG/3ML SOLN nebulizer solution Inhale 1 vial into the lungs 4 times daily      latanoprost (XALATAN) 0.005 % ophthalmic solution Place 1 drop into both eyes nightly      memantine (NAMENDA) 5 MG tablet Take 5 mg by mouth 2 times daily      mirtazapine (REMERON) 15 MG tablet Take 30 mg by Time Spent Counseling/Care coordination? yes   Collaborated Care with Ovi Pérez, and Scarlett       [facility], [facility]